# Patient Record
Sex: FEMALE | Race: ASIAN | NOT HISPANIC OR LATINO | Employment: FULL TIME
[De-identification: names, ages, dates, MRNs, and addresses within clinical notes are randomized per-mention and may not be internally consistent; named-entity substitution may affect disease eponyms.]

---

## 2017-10-29 ENCOUNTER — HEALTH MAINTENANCE LETTER (OUTPATIENT)
Age: 37
End: 2017-10-29

## 2018-06-20 ENCOUNTER — TELEPHONE (OUTPATIENT)
Dept: FAMILY MEDICINE | Facility: CLINIC | Age: 38
End: 2018-06-20

## 2018-06-20 ENCOUNTER — OFFICE VISIT (OUTPATIENT)
Dept: FAMILY MEDICINE | Facility: CLINIC | Age: 38
End: 2018-06-20
Payer: COMMERCIAL

## 2018-06-20 VITALS
SYSTOLIC BLOOD PRESSURE: 113 MMHG | HEART RATE: 64 BPM | HEIGHT: 62 IN | WEIGHT: 130.2 LBS | DIASTOLIC BLOOD PRESSURE: 76 MMHG | BODY MASS INDEX: 23.96 KG/M2 | TEMPERATURE: 97.5 F

## 2018-06-20 DIAGNOSIS — Z23 NEED FOR PROPHYLACTIC VACCINATION AND INOCULATION AGAINST VARICELLA: ICD-10-CM

## 2018-06-20 DIAGNOSIS — H93.11 TINNITUS, RIGHT: ICD-10-CM

## 2018-06-20 DIAGNOSIS — H72.92 PERFORATION OF TYMPANIC MEMBRANE, LEFT: ICD-10-CM

## 2018-06-20 DIAGNOSIS — E11.9 TYPE 2 DIABETES MELLITUS WITHOUT COMPLICATION, UNSPECIFIED LONG TERM INSULIN USE STATUS: Primary | ICD-10-CM

## 2018-06-20 LAB
ALBUMIN SERPL-MCNC: 4.7 MG/DL (ref 3.9–5.1)
ALP SERPL-CCNC: 53.7 U/L (ref 40–150)
ALT SERPL-CCNC: <15 U/L (ref 0–45)
AST SERPL-CCNC: 10.3 U/L (ref 0–45)
BASOPHILS # BLD AUTO: 0 THOU/UL (ref 0–0.2)
BASOPHILS NFR BLD AUTO: 1 % (ref 0–2)
BILIRUB SERPL-MCNC: 0.3 MG/DL (ref 0.2–1.3)
BUN SERPL-MCNC: 8.9 MG/DL (ref 7–19)
CALCIUM SERPL-MCNC: 8.9 MG/DL (ref 8.5–10.1)
CHLORIDE SERPLBLD-SCNC: 106 MMOL/L (ref 98–110)
CO2 SERPL-SCNC: 24.8 MMOL/L (ref 20–32)
CREAT SERPL-MCNC: 0.4 MG/DL (ref 0.5–1)
CREAT UR-MCNC: 86.3 MG/DL
EOSINOPHIL # BLD AUTO: 0.1 THOU/UL (ref 0–0.4)
EOSINOPHIL NFR BLD AUTO: 2 % (ref 0–6)
ERYTHROCYTE [DISTWIDTH] IN BLOOD BY AUTOMATED COUNT: 22.6 % (ref 11–14.5)
GFR SERPL CREATININE-BSD FRML MDRD: >90 ML/MIN/1.7 M2
GLUCOSE SERPL-MCNC: 89.9 MG'DL (ref 70–99)
HBA1C MFR BLD: 5.3 % (ref 4.1–5.7)
HCT VFR BLD AUTO: 17.8 % (ref 35–47)
HGB BLD-MCNC: 4.3 G/DL (ref 12–16)
LYMPHOCYTES # BLD AUTO: 1.1 THOU/UL (ref 0.8–4.4)
LYMPHOCYTES NFR BLD AUTO: 29 % (ref 20–40)
MCH RBC QN AUTO: 14.7 PG (ref 27–34)
MCHC RBC AUTO-ENTMCNC: 24.2 G/DL (ref 32–36)
MCV RBC AUTO: 61 FL (ref 80–100)
MICROALBUMIN UR-MCNC: 5.06 MG/DL (ref 0–1.99)
MICROALBUMIN/CREAT UR: 58.6 MG/G
MONOCYTES # BLD AUTO: 0.4 THOU/UL (ref 0–0.9)
MONOCYTES NFR BLD AUTO: 11 % (ref 2–10)
NEUTROPHILS # BLD AUTO: 2.3 THOU/UL (ref 2–7.7)
NEUTROPHILS NFR BLD AUTO: 59 % (ref 50–70)
OVALOCYTES BLD QL SMEAR: ABNORMAL
PLATELET # BLD AUTO: 282 THOU/UL (ref 140–440)
PLATELET BLD QL SMEAR: NORMAL
PMV BLD AUTO: ABNORMAL FL (ref 8.5–12.5)
POTASSIUM SERPL-SCNC: 3.5 MMOL/DL (ref 3.2–4.6)
PROT SERPL-MCNC: 7.5 G/DL (ref 6.8–8.8)
RBC # BLD AUTO: 2.92 MILL/UL (ref 3.8–5.4)
SODIUM SERPL-SCNC: 139.8 MMOL/L (ref 132–142)
WBC # BLD AUTO: 3.9 THOU/UL (ref 4–11)

## 2018-06-20 RX ORDER — LANCING DEVICE
EACH MISCELLANEOUS
Qty: 1 EACH | Refills: 0 | Status: SHIPPED | OUTPATIENT
Start: 2018-06-20 | End: 2018-06-28

## 2018-06-20 NOTE — MR AVS SNAPSHOT
After Visit Summary   2018    Lizzy Wright    MRN: [id number]           Patient Information     Date Of Birth          1980        Visit Information        Provider Department      2018 10:00 AM Yadira Kruse MD [facility]        Today's Diagnoses     Type 2 diabetes mellitus without complication, unspecified long term insulin use status (H)    -  1    Perforation of tympanic membrane, left        Tinnitus, right           Follow-ups after your visit        Additional Services     OTOLARYNGOLOGY REFERRAL       Patient to stop at the [employer] Desk    Reason for Referral: Perforation and Tinnitus     needed: Yes  Language: Tabatha    May leave message on voicemail: Yes                  Follow-up notes from your care team     Return in about 1 week (around 2018).      Future tests that were ordered for you today     Open Future Orders        Priority Expected Expires Ordered    OTOLARYNGOLOGY REFERRAL Routine  2018            Who to contact     Please call your clinic at [phone] to:    Ask questions about your health    Make or cancel appointments    Discuss your medicines    Learn about your test results    Speak to your doctor            Additional Information About Your Visit        COM [employer]er[employer] Information     Caterna is an electronic gateway that provides easy, online access to your medical records. With Caterna, you can request a clinic appointment, read your test results, renew a prescription or communicate with your care team.     To sign up for Caterna visit the website at [email or web address]   You will be asked to enter the access code listed below, as well as some personal information. Please follow the directions to create your username and password.     Your access code is: [email or web address]  Expires: 2018 10:40 AM     Your access code will  in 90 days. If you need help or a new code, please contact your [facility] or call [phone] for assistance.        Care EveryWhere ID     This is your Care EveryWhere ID. This could be used by other organizations to access your [facility] medical records  [id number]        Your Vitals Were     Pulse Temperature Height BMI (Body Mass Index)          64 97.5  F (36.4  C) 5' 1.5\" (156.2 cm) 24.2 kg/m2         Blood Pressure from Last 3 Encounters:   06/20/18 113/76   05/19/16 101/68   01/29/16 106/70    Weight from Last 3 Encounters:   06/20/18 130 lb 3.2 oz (59.1 kg)   05/19/16 153 lb 6.4 oz (69.6 kg)   01/29/16 148 lb 6.4 oz (67.3 kg)              We Performed the Following     CBC with Diff Plt ([facility])     Comprehensive Metabolic Panel ([facility])     Hemoglobin A1c ([facility])     MICRO ALBUMIN, RANDOM URINE     Vitamin D  25-Hydroxy ([facility])          Today's Medication Changes          These changes are accurate as of 6/20/18 10:40 AM.  If you have any questions, ask your nurse or doctor.               Start taking these medicines.        Dose/Directions    blood glucose lancets standard   Commonly known as:  no brand specified   Used for:  Type 2 diabetes mellitus without complication, unspecified long term insulin use status (H)   Started by:  Yadira Kruse MD        Use to test blood sugar 4 times daily or as directed.   Quantity:  100 each   Refills:  0       blood glucose lancing device   Commonly known as:  no brand specified   Used for:  Type 2 diabetes mellitus without complication, unspecified long term insulin use status (H)   Started by:  Yadira Kruse MD        Use to test blood sugars 4 times daily or as directed.   Quantity:  1 each   Refills:  0       blood glucose monitoring test strip   Commonly known as:  no brand specified   Used for:  Type 2 diabetes mellitus without complication, unspecified long term insulin use status (H)   Started by:  Yadira Kruse MD        Use to test blood sugars 4 times daily or as directed   Quantity: "  100 strip   Refills:  0         These medicines have changed or have updated prescriptions.        Dose/Directions    * CONTOUR NEXT EZ MONITOR w/Device Kit   This may have changed:  Another medication with the same name was added. Make sure you understand how and when to take each.   Changed by:  Yadira Kruse MD        Test when eating   Refills:  0       * blood glucose monitoring meter device kit   Commonly known as:  no brand specified   This may have changed:  You were already taking a medication with the same name, and this prescription was added. Make sure you understand how and when to take each.   Used for:  Type 2 diabetes mellitus without complication, unspecified long term insulin use status (H)   Changed by:  Yadira Kruse MD        Use to test blood sugar 4 times daily or as directed.   Quantity:  1 kit   Refills:  0       * Notice:  This list has 2 medication(s) that are the same as other medications prescribed for you. Read the directions carefully, and ask your doctor or other care provider to review them with you.         Where to get your medicines      These medications were sent to [facility] - [place], [place] - [address], [place] [address]     Phone:  [phone]     blood glucose lancets standard    blood glucose lancing device    blood glucose monitoring meter device kit    blood glucose monitoring test strip                Primary Care Provider Office Phone # Fax #    Deshaun Eid [phone]       [address] [address]        Equal Access to Services     JAIDEN MENDEZ AH: Monserrat felix Sobianca, waaxda luqadaha, qaybta kaalmada kar bullard. So [facility] [phone].    ATENCIÓN: Si habla español, tiene a amado disposición servicios gratuitos de asistencia lingüística. Llame al [phone].    We comply with applicable federal civil rights laws  and [place] laws. We do not discriminate on the basis of race, color, national origin, age, disability, sex, sexual orientation, or gender identity.            Thank you!     Thank you for choosing [facility]  for your care. Our goal is always to provide you with excellent care. Hearing back from our patients is one way we can continue to improve our services. Please take a few minutes to complete the written survey that you may receive in the mail after your visit with us. Thank you!             Your Updated Medication List - Protect others around you: Learn how to safely use, store and throw away your medicines at www.disposemymeds.org.          This list is accurate as of 6/20/18 10:40 AM.  Always use your most recent med list.                   Brand Name Dispense Instructions for use Diagnosis    blood glucose lancets standard    no brand specified    100 each    Use to test blood sugar 4 times daily or as directed.    Type 2 diabetes mellitus without complication, unspecified long term insulin use status (H)       blood glucose lancing device    no brand specified    1 each    Use to test blood sugars 4 times daily or as directed.    Type 2 diabetes mellitus without complication, unspecified long term insulin use status (H)       blood glucose monitoring test strip    no brand specified    100 strip    Use to test blood sugars 4 times daily or as directed    Type 2 diabetes mellitus without complication, unspecified long term insulin use status (H)       * CONTOUR NEXT EZ MONITOR w/Device Kit      Test when eating        * blood glucose monitoring meter device kit    no brand specified    1 kit    Use to test blood sugar 4 times daily or as directed.    Type 2 diabetes mellitus without complication, unspecified long term insulin use status (H)       * Notice:  This list has 2 medication(s) that are the same as other medications prescribed for you. Read the directions carefully, and ask your doctor or  other care provider to review them with you.

## 2018-06-20 NOTE — TELEPHONE ENCOUNTER
Called by lab for a critical lab value: Hemoglobin of 4.3.  I note low WBC of 3.9 and low hematocrit however normal platelets.  Spoke with the  who stated that he looked at the sample under the microscope and it did look like a sample with low hemoglobin. Unknown if there was a chance that this was contaminated.    Patient was seen in clinic today and not noted to be weak or dizzy. No falls noted. No acute bleed suspected.     Attempted to call the patient to assess symptoms. Patient did not answer. Through  line I was able to leave a message detailing the critically low hemoglobin and recommendation that patient be assessed in the ED immediately.  I also left the clinic phone number in case patient has any questions.      Will route this note to our RN triage nurse as well as the night call resident so that more attempts can be made to reach the patient.     Franny Reynoso MD  PGY-2, [facility]   Pager: [phone]

## 2018-06-20 NOTE — PROGRESS NOTES
Preceptor Attestation:   Patient seen, evaluated and discussed with the resident. I have verified the content of the note, which accurately reflects my assessment of the patient and the plan of care.   Supervising Physician:  Precious De Los Santos MD

## 2018-06-20 NOTE — TELEPHONE ENCOUNTER
Again attempted to call patient regarding abnormal lab value with the assistance of the Language line. Patient did not answer again. Had  leave message regarding concern for low hemoglobin and that patient should be assessed in the ER as a result.    This is attempt two on 6/20/2018 at 6:54 PM    Miriam Newberry DO  PGY2

## 2018-06-20 NOTE — PROGRESS NOTES
SUBJECTIVE       Ball Adriana is a 37 year old  female with a PMH significant for:     Patient Active Problem List   Diagnosis     History of      Need for prophylactic vaccination and inoculation against varicella     H/O gestational diabetes mellitus, not currently pregnant     DM2 (diabetes mellitus, type 2) (H)     She presents to re establish care.    Patient has not been to a doctor for the past 2 years due to lack of insurance.  She recently acquired insurance and is presenting to reinstate her primary care.  Patient has a history of diabetes after gestational diabetes.  Per chart review she was not on any medications last visit and labs were collected.  Patient has been out of medications and diabetes supplies for years.  Patient denies any other past medical history.      The past 3-4 months patient has been struggling with tinnitus in her right ear.  She states that sounds like wind blowing by her ear.  Most notable when it is quiet but they are consistently.  Patient notes no drainage or pain from either ear.     Patient has no other complaints or questions at this time.     PMH, Medications and Allergies were reviewed and updated as needed.        REVIEW OF SYSTEMS     CONSTITUTIONAL: NEGATIVE for fever, chills, change in weight  INTEGUMENTARY/SKIN: NEGATIVE for worrisome rashes, moles or lesions  EYES: NEGATIVE for vision changes or irritation  ENT/MOUTH: Pos for tinnitus in r ear. NEGATIVE for mouth and throat problems  RESP: NEGATIVE for significant cough or SOB  CV: NEGATIVE for chest pain, palpitations or peripheral edema  GI: NEGATIVE for nausea, abdominal pain, heartburn, or change in bowel habits  : NEGATIVE for frequency, dysuria, or hematuria  MUSCULOSKELETAL: NEGATIVE for significant arthralgias or myalgia  NEURO: NEGATIVE for weakness, dizziness or paresthesias        OBJECTIVE     Vitals:    18 0953   BP: 113/76   Pulse: 64   Temp: 97.5  F (36.4  C)   Weight: 130 lb 3.2  "oz (59.1 kg)   Height: 5' 1.5\" (156.2 cm)     Body mass index is 24.2 kg/(m^2).    Constitutional: Awake, alert, cooperative, no apparent distress, and appears stated age.  Eyes: Lids and lashes normal, pupils equal, sclera clear, conjunctiva normal.  ENT: Normocephalic, without obvious abnormality, atramatic, external ears without lesions, TMs note some scarring.  Right TM shows good cone of light and normal landmarks.  Left TM with perforation located centrally.  Not responsive to insufflation.    Neck: Supple, symmetrical, trachea midline, no adenopathy, thyroid symmetric, not enlarged and no tenderness, skin normal.  Lungs: No increased work of breathing, good air exchange, clear to auscultation bilaterally, no crackles or wheezing.  Cardiovascular: Regular rate and rhythm, normal S1 and S2, no S3 or S4, and no murmur noted.  Abdomen:Normal bowel sounds, soft, non-distended, non-tender, no masses palpated, no hepatosplenomegally.  Musculoskeletal: Full range of motion noted.   Neurologic: Awake, alert, oriented to name, place and time.  Cranial nerves II-XII are grossly intact.   gait is normal.  Neuropsychiatric: Normal affect, mood, orientation, memory and insight.  Skin: No rashes, erythema, pallor, petechia or purpura.    No results found for this or any previous visit (from the past 24 hour(s)).        ASSESSMENT AND PLAN     Ball was seen today for diabetes.    Diagnoses and all orders for this visit:    Type 2 diabetes mellitus without complication, unspecified long term insulin use status (H): Per chart review patient has had a history of hemoglobin A1c 13 postpartum and 6.5 most recently.  Unclear the degree of insulin resistance at this point.  Will get labs today and provide monitoring for home with plans of reevaluating in 1 week.  Depending on sugars may need to start medication.  Last lipid screening was normal.  Patient noted to have vitamin D deficiency and anemia on last screening. will get these " labs today as well.  -     blood glucose monitoring (NO BRAND SPECIFIED) meter device kit; Use to test blood sugar 4 times daily or as directed.  -     blood glucose monitoring (NO BRAND SPECIFIED) test strip; Use to test blood sugars 4 times daily or as directed  -     blood glucose (NO BRAND SPECIFIED) lancing device; Use to test blood sugars 4 times daily or as directed.  -     blood glucose (NO BRAND SPECIFIED) lancets standard; Use to test blood sugar 4 times daily or as directed.  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  Will refer to ENT for further evaluation  -     OTOLARYNGOLOGY REFERRAL; Future    Tinnitus, right: Due to symptoms and perforatio n present will refer for hearing evaluation  -     OTOLARYNGOLOGY REFERRAL; Future    Need for prophylactic vaccination and inoculation against varicella: patient noted to be nonimmune to varicella during last pregnancy.  Has only received 1 vaccination today.  Will complete second vaccination today  -     ADMIN VACCINE, INITIAL  -     CHICKEN POX VACCINE,LIVE,SUBCUT    Last Pap smear noted to be in 2013.  Advised patient to follow-up for complete physical with next visit.      RTC in 1 week for follow up of labs, DM, and pap smear or sooner if develops new or worsening symptoms.  I discussed the patient with   who is in agreement with the assessment and plan.   Yadira Kruse

## 2018-06-20 NOTE — NURSING NOTE
Due to patient being non-English speaking/uses sign language, an  was used for this visit. Only for face-to-face interpretation by an external agency, date and length of interpretation can be found on the scanned worksheet.     name: Sherif Nuñez  Agency: [employer]  Language: Tabatha   Telephone number: [phone]  Type of interpretation: Face-to-face, spoken

## 2018-06-21 LAB — 25(OH)D3 SERPL-MCNC: 12.2 NG/ML (ref 30–80)

## 2018-06-21 NOTE — PATIENT INSTRUCTIONS
"OTOLARYNGOLOGY REFERRAL  Referral placed online with [facility] who will contact patient to schedule  \"Thank you for your referral!  Thank you for your submission. We will respond to you as soon as possible.\"  "

## 2018-06-28 ENCOUNTER — OFFICE VISIT (OUTPATIENT)
Dept: FAMILY MEDICINE | Facility: CLINIC | Age: 38
End: 2018-06-28
Payer: COMMERCIAL

## 2018-06-28 VITALS
SYSTOLIC BLOOD PRESSURE: 115 MMHG | RESPIRATION RATE: 18 BRPM | BODY MASS INDEX: 23.89 KG/M2 | HEIGHT: 62 IN | TEMPERATURE: 97.8 F | WEIGHT: 129.8 LBS | HEART RATE: 59 BPM | DIASTOLIC BLOOD PRESSURE: 75 MMHG

## 2018-06-28 DIAGNOSIS — D50.8 OTHER IRON DEFICIENCY ANEMIA: Primary | ICD-10-CM

## 2018-06-28 DIAGNOSIS — Z86.39 HISTORY OF INSULIN DEPENDENT DIABETES MELLITUS: ICD-10-CM

## 2018-06-28 DIAGNOSIS — D50.8 OTHER IRON DEFICIENCY ANEMIA: ICD-10-CM

## 2018-06-28 LAB
BASOPHILS # BLD AUTO: 0.1 THOU/UL (ref 0–0.2)
BASOPHILS NFR BLD AUTO: 1 % (ref 0–2)
EOSINOPHIL # BLD AUTO: 0.1 THOU/UL (ref 0–0.4)
EOSINOPHIL NFR BLD AUTO: 1 % (ref 0–6)
ERYTHROCYTE [DISTWIDTH] IN BLOOD BY AUTOMATED COUNT: 29.7 % (ref 11–14.5)
HCT VFR BLD AUTO: 34.8 % (ref 35–47)
HGB BLD-MCNC: 10.4 G/DL (ref 12–16)
LYMPHOCYTES # BLD AUTO: 1.7 THOU/UL (ref 0.8–4.4)
LYMPHOCYTES NFR BLD AUTO: 27 % (ref 20–40)
MCH RBC QN AUTO: 21.4 PG (ref 27–34)
MCHC RBC AUTO-ENTMCNC: 29.9 G/DL (ref 32–36)
MCV RBC AUTO: 72 FL (ref 80–100)
MONOCYTES # BLD AUTO: 0.5 THOU/UL (ref 0–0.9)
MONOCYTES NFR BLD AUTO: 8 % (ref 2–10)
NEUTROPHILS # BLD AUTO: 3.8 THOU/UL (ref 2–7.7)
NEUTROPHILS NFR BLD AUTO: 62 % (ref 50–70)
OVALOCYTES BLD QL SMEAR: ABNORMAL
PLATELET # BLD AUTO: 227 THOU/UL (ref 140–440)
PLATELET BLD QL SMEAR: NORMAL
PMV BLD AUTO: ABNORMAL FL (ref 8.5–12.5)
RBC # BLD AUTO: 4.85 MILL/UL (ref 3.8–5.4)
WBC # BLD AUTO: 6.2 THOU/UL (ref 4–11)

## 2018-06-28 RX ORDER — AMOXICILLIN 250 MG
1 CAPSULE ORAL DAILY
COMMUNITY
End: 2018-06-28

## 2018-06-28 RX ORDER — FERROUS SULFATE 325(65) MG
325 TABLET ORAL
Qty: 90 TABLET | Refills: 3 | Status: SHIPPED | OUTPATIENT
Start: 2018-06-28 | End: 2021-03-24

## 2018-06-28 RX ORDER — AMOXICILLIN 250 MG
1 CAPSULE ORAL DAILY
Qty: 90 TABLET | Refills: 0 | Status: SHIPPED | OUTPATIENT
Start: 2018-06-28 | End: 2021-03-29

## 2018-06-28 RX ORDER — FERROUS SULFATE 325(65) MG
325 TABLET ORAL
COMMUNITY
Start: 2018-06-22 | End: 2018-06-28

## 2018-06-28 RX ORDER — FERROUS SULFATE 325(65) MG
325 TABLET ORAL
COMMUNITY
End: 2018-06-28

## 2018-06-28 RX ORDER — AMOXICILLIN 250 MG
2 CAPSULE ORAL
COMMUNITY
Start: 2018-06-22 | End: 2018-06-28

## 2018-06-28 NOTE — PROGRESS NOTES
SUBJECTIVE       Ball Adriana is a 37 year old  female with a PMH significant for:     Patient Active Problem List   Diagnosis     History of      H/O gestational diabetes mellitus, not currently pregnant     DM2 (diabetes mellitus, type 2) (H)     Perforation of tympanic membrane, left     She presents for hospitalization follow-up.    Patient was discharged from [facility] on  for anemia.  Patient was admitted after CBC showed a hemoglobin of 4.  She received 4 units.  All labs reviewed.  Most consistent with iron deficiency anemia.  Negative for thalassemia.  Patient was started on oral iron 3 times daily.    Patient is currently taking her iron supplement every morning.  She states it upsets her stomach.  Encouraged her to increase it to as prescribed.  Advised taking it with food.  Patient states her tinnitus fatigue and restless leg have all improved since her transfusion.  Patient has been taking senna for constipation.  Patient denies any dark or tarry stools, abdominal pain, cold symptoms, dysuria, shortness of breath, palpitations, chest pain.    Of note per hospital record patient had multiple readings of blood sugars in the 70s and 80s.  I do not believe she needs qualification of diabetes at this time.    PMH, Medications and Allergies were reviewed and updated as needed.        REVIEW OF SYSTEMS     CONSTITUTIONAL: NEGATIVE for fever, chills  INTEGUMENTARY/SKIN: NEGATIVE for worrisome rashes, moles or lesions  EYES: NEGATIVE for vision changes or irritation  ENT/MOUTH: NEGATIVE for ear, mouth and throat problems  RESP: NEGATIVE for significant cough or SOB  CV: NEGATIVE for chest pain, palpitations or peripheral edema  GI: Abdominal pain and constipation with PO iron   : NEGATIVE for frequency, dysuria, or hematuria  MUSCULOSKELETAL: NEGATIVE for significant arthralgias or myalgia  NEURO: NEGATIVE for weakness, dizziness or paresthesias  HEME/ALLERGY: NEGATIVE for bleeding  "problems  PSYCHIATRIC: NEGATIVE for changes in mood or affect        OBJECTIVE     Vitals:    06/28/18 1025   BP: 115/75   Pulse: 59   Resp: 18   Temp: 97.8  F (36.6  C)   Weight: 129 lb 12.8 oz (58.9 kg)   Height: 5' 1.5\" (156.2 cm)     Body mass index is 24.13 kg/(m^2).    Constitutional: Awake, alert, cooperative, no apparent distress, and appears stated age.  Eyes: Lids and lashes normal, sclera clear, conjunctiva normal.  ENT: Normocephalic, without obvious abnormality, atramatic  Hematologic / Lymphatic: No cervical lymphadenopathy and no supraclavicular lymphadenopathy.  Lungs: No increased work of breathing, good air exchange, clear to auscultation bilaterally, no crackles or wheezing.  Cardiovascular: Regular rate and rhythm, normal S1 and S2, no S3 or S4, and no murmur noted.  Abdomen: No scars, normal bowel sounds, soft, non-distended, non-tender, no masses palpated, no hepatosplenomegally.  Musculoskeletal: No redness, warmth, or swelling of the joints.  Full range of motion noted.   Neurologic: Awake, alert, oriented to name, place and time.  Cranial nerves II-XII are grossly intact. Gait is normal.  Neuropsychiatric: Normal affect, mood, orientation, memory and insight.  Skin: No rashes, erythema, pallor, petechia or purpura.    No results found for this or any previous visit (from the past 24 hour(s)).      ASSESSMENT AND PLAN     1. Other iron deficiency anemia  - CBC w/ Diff and Plt ([facility])  - senna-docusate (SENOKOT-S;PERICOLACE) 8.6-50 MG per tablet; Take 1 tablet by mouth daily  Dispense: 90 tablet; Refill: 0  - ferrous sulfate (IRON) 325 (65 Fe) MG tablet; Take 1 tablet (325 mg) by mouth 3 times daily (with meals)  Dispense: 90 tablet; Refill: 3    RTC in 2 months for follow up of Hgb and ferratin or sooner if develops new or worsening symptoms. Patient will need a pap smear at that visit.    I discussed the patient with  who is in agreement with the assessment and plan. "   Yadira Kruse

## 2018-06-28 NOTE — MR AVS SNAPSHOT
"              After Visit Summary   2018    Lizzy Wright    MRN: [id number]           Patient Information     Date Of Birth          1980        Visit Information        Provider Department      2018 10:00 AM Yadira Kruse MD [facility]        Today's Diagnoses     Other iron deficiency anemia           Follow-ups after your visit        Follow-up notes from your care team     Return in about 2 months (around 2018) for Lab Work, Physical Exam.      Who to contact     Please call your clinic at [phone] to:    Ask questions about your health    Make or cancel appointments    Discuss your medicines    Learn about your test results    Speak to your doctor            Additional Information About Your Visit        MyChart Information     [employer] is an electronic gateway that provides easy, online access to your medical records. With [employer], you can request a clinic appointment, read your test results, renew a prescription or communicate with your care team.     To sign up for [employer] visit the website at [email or web address]   You will be asked to enter the access code listed below, as well as some personal information. Please follow the directions to create your username and password.     Your access code is: [email or web address]  Expires: 2018 10:40 AM     Your access code will  in 90 days. If you need help or a new code, please contact your [facility] or call [phone] for assistance.        Care EveryWhere ID     This is your Care EveryWhere ID. This could be used by other organizations to access your [facility] medical records  [id number]        Your Vitals Were     Pulse Temperature Respirations Height BMI (Body Mass Index)       59 97.8  F (36.6  C) 18 5' 1.5\" (156.2 cm) 24.13 kg/m2        Blood Pressure from Last 3 Encounters:   18 115/75   18 113/76   16 101/68    Weight from Last 3 Encounters:   18 129 lb 12.8 " oz (58.9 kg)   06/20/18 130 lb 3.2 oz (59.1 kg)   05/19/16 153 lb 6.4 oz (69.6 kg)              We Performed the Following     CBC w/ Diff and Plt ([facility])          Today's Medication Changes          These changes are accurate as of 6/28/18 10:56 AM.  If you have any questions, ask your nurse or doctor.               These medicines have changed or have updated prescriptions.        Dose/Directions    ferrous sulfate 325 (65 Fe) MG tablet   Commonly known as:  IRON   This may have changed:  when to take this   Used for:  Other iron deficiency anemia   Changed by:  Yadira Kruse MD        Dose:  325 mg   Take 1 tablet (325 mg) by mouth 3 times daily (with meals)   Quantity:  90 tablet   Refills:  3            Where to get your medicines      These medications were sent to [facility] - [place], [place] - [address], [place] [address]     Phone:  [phone]     ferrous sulfate 325 (65 Fe) MG tablet    senna-docusate 8.6-50 MG per tablet                Primary Care Provider Office Phone # Fax #    Deshaun Eid [phone]       [address] [address]        Equal Access to Services     JAIDEN MENDEZ : Monserrat martinezo Sobianca, waaxda luileanaadaha, qaybta kaalmada adeegyada, kar miles. So [facility] [phone].    ATENCIÓN: Si habla español, tiene a amado disposición servicios gratuitos de asistencia lingüística. Llame al [phone].    We comply with applicable federal civil rights laws and [place] laws. We do not discriminate on the basis of race, color, national origin, age, disability, sex, sexual orientation, or gender identity.            Thank you!     Thank you for choosing [facility]  for your care. Our goal is always to provide you with excellent care. Hearing back from our patients is one way we can continue to improve our services. Please take a few minutes to complete the  written survey that you may receive in the mail after your visit with us. Thank you!             Your Updated Medication List - Protect others around you: Learn how to safely use, store and throw away your medicines at www.disposemymeds.org.          This list is accurate as of 6/28/18 10:56 AM.  Always use your most recent med list.                   Brand Name Dispense Instructions for use Diagnosis    ferrous sulfate 325 (65 Fe) MG tablet    IRON    90 tablet    Take 1 tablet (325 mg) by mouth 3 times daily (with meals)    Other iron deficiency anemia       senna-docusate 8.6-50 MG per tablet    SENOKOT-S;PERICOLACE    90 tablet    Take 1 tablet by mouth daily    Other iron deficiency anemia

## 2018-06-28 NOTE — PROGRESS NOTES
Preceptor Attestation:   Patient seen, evaluated and discussed with the resident. I have verified the content of the note, which accurately reflects my assessment of the patient and the plan of care.   Supervising Physician:  Mayur Orozco MD

## 2018-06-29 PROBLEM — Z86.39 HISTORY OF INSULIN DEPENDENT DIABETES MELLITUS: Status: ACTIVE | Noted: 2018-06-29

## 2020-05-14 ENCOUNTER — VIRTUAL VISIT (OUTPATIENT)
Dept: FAMILY MEDICINE | Facility: CLINIC | Age: 40
End: 2020-05-14
Payer: COMMERCIAL

## 2020-05-14 VITALS — WEIGHT: 142 LBS | BODY MASS INDEX: 26.4 KG/M2

## 2020-05-14 DIAGNOSIS — R53.83 FATIGUE, UNSPECIFIED TYPE: ICD-10-CM

## 2020-05-14 DIAGNOSIS — Z86.2 HISTORY OF ANEMIA: Primary | ICD-10-CM

## 2020-05-14 DIAGNOSIS — Z86.2 HISTORY OF ANEMIA: ICD-10-CM

## 2020-05-14 LAB
ALBUMIN SERPL-MCNC: 5 MG/DL (ref 3.9–5.1)
ALP SERPL-CCNC: 74.4 U/L (ref 40–150)
ALT SERPL-CCNC: 16.5 U/L (ref 0–45)
AST SERPL-CCNC: 17.5 U/L (ref 0–45)
BASOPHILS # BLD AUTO: 0 THOU/UL (ref 0–0.2)
BASOPHILS NFR BLD AUTO: 1 % (ref 0–2)
BILIRUB SERPL-MCNC: <0.3 MG/DL (ref 0.2–1.3)
BUN SERPL-MCNC: <4.7 MG/DL (ref 7–19)
CALCIUM SERPL-MCNC: 8.9 MG/DL (ref 8.5–10.1)
CHLORIDE SERPLBLD-SCNC: 108.4 MMOL/L (ref 98–110)
CO2 SERPL-SCNC: 24.9 MMOL/L (ref 20–32)
CREAT SERPL-MCNC: 0.5 MG/DL (ref 0.5–1)
DACRYOCYTES BLD QL SMEAR: ABNORMAL
EOSINOPHIL # BLD AUTO: 0.1 THOU/UL (ref 0–0.4)
EOSINOPHIL NFR BLD AUTO: 1 % (ref 0–6)
ERYTHROCYTE [DISTWIDTH] IN BLOOD BY AUTOMATED COUNT: 23 % (ref 11–14.5)
FERRITIN SERPL-MCNC: <2 NG/ML (ref 10–130)
GFR SERPL CREATININE-BSD FRML MDRD: >90 ML/MIN/1.7 M2
GLUCOSE SERPL-MCNC: 141.4 MG'DL (ref 70–99)
HBA1C MFR BLD: 5.9 % (ref 0–5.7)
HCT VFR BLD AUTO: 17.3 % (ref 35–47)
HGB BLD-MCNC: 4.1 G/DL (ref 12–16)
IRON SATN MFR SERPL: 2 % (ref 20–50)
IRON SERPL-MCNC: 11 UG/DL (ref 42–175)
LYMPHOCYTES # BLD AUTO: 1.4 THOU/UL (ref 0.8–4.4)
LYMPHOCYTES NFR BLD AUTO: 33 % (ref 20–40)
MCH RBC QN AUTO: 14.3 PG (ref 27–34)
MCHC RBC AUTO-ENTMCNC: 23.7 G/DL (ref 32–36)
MCV RBC AUTO: 61 FL (ref 80–100)
MONOCYTES # BLD AUTO: 0.8 THOU/UL (ref 0–0.9)
MONOCYTES NFR BLD AUTO: 18 % (ref 2–10)
NEUTROPHILS # BLD AUTO: 1.9 THOU/UL (ref 2–7.7)
NEUTROPHILS NFR BLD AUTO: 47 % (ref 50–70)
OVALOCYTES BLD QL SMEAR: ABNORMAL
PLATELET # BLD AUTO: 306 THOU/UL (ref 140–440)
PLATELET BLD QL SMEAR: NORMAL
PMV BLD AUTO: ABNORMAL FL
POLYCHROMASIA BLD QL SMEAR: ABNORMAL
POTASSIUM SERPL-SCNC: 3.6 MMOL/L (ref 3.2–4.6)
PROT SERPL-MCNC: 8.1 G/DL (ref 6.8–8.8)
RBC # BLD AUTO: 2.86 MILL/UL (ref 3.8–5.4)
SCHISTOCYTES BLD QL SMEAR: ABNORMAL
SODIUM SERPL-SCNC: 139.1 MMOL/L (ref 132–142)
TARGETS BLD QL SMEAR: ABNORMAL
TIBC SERPL-MCNC: 520 UG/DL (ref 313–563)
TRANSFERRIN SERPL-MCNC: 416 MG/DL (ref 212–360)
WBC # BLD AUTO: 4.1 THOU/UL (ref 4–11)

## 2020-05-14 NOTE — PROGRESS NOTES
"Family Medicine Telephone Visit Note         Telephone Visit Consent   Patient was verbally read the following and verbal consent was obtained.    \"Telephone visits are billed at different rates depending on your insurance coverage. During this emergency period, for some insurers they may be billed the same as an in-person visit.  Please reach out to your insurance provider with any questions.  If during the course of the call the physician/provider feels a telephone visit is not appropriate, you will not be charged for this service.\"    Name person giving consent:  Patient   Date verbal consent given:  5/14/2020  Time verbal consent given:  9:22 AM    Chief Complaint   Patient presents with     Diabetes     she wants to be screen for DM, wants to know if her sugars are going up or down      Ear Problem     she is having some ringing in both ears, been going on for 3-4 months,      Due to patient being non-English speaking/uses sign language, an  was used for this visit. Only for face-to-face interpretation by an external agency, date and length of interpretation can be found on the scanned worksheet.     name: Cresencio Dupont   Agency: [employer]  Language: Tabatha   Type of interpretation: Telephone, spoken         HPI   Patients name: Lizzy  Appointment start time:  9:32 AM    Lizzy Cohen is a pleasant 29-year-old female who has a visit today to discuss fatigue.  Patient notes that she had similar symptoms a few years back and was admitted to the hospital.  She is unclear if this was due to diabetes.  On chart review it appears that patient was admitted for severe anemia.    Admission summary per care everywhere:   Lizzy Cohen was admitted on 6/21/2018 for severe anemia.  She was found to have a hemoglobin of 4 at her clinic the day prior to admission.  At that time her MCV was 59.  She received 4 units of packed red blood cells.  She did not have any source of bleeding apart from being on her menstrual " "cycle.  Menstrual cycle was normal at that time.  Her ferritin and iron stores were severely low at that time.Patient endorsed a Vegetarian diet. Patient was discharged with a hemoglobin of 8.7.    She followed up in our clinic directly following dicharge back in 2018.  She had been discharged with instructions to take 1 tablet of ferrous sulfate 3 times a day.  This was somewhat upsetting her stomach and patient was taking senna.  Patient notes that she has not taken her medication in over a year now as she has not had insurance.  She recently got insurance again which is why she is calling today.  She notes that she has been having fatigue over the last 4 months.  She denies any active bleeding.  She does have a regular menstrual cycle however it is not heavy or prolonged.  She has no rectal bleeding, no hematemesis.  She denies any family history of any bleeding disorders.  No history of hemoglobinopathies.  Patient denies any rashes or new concerns.  Patient does have a history of type 2 diabetes per her chart.  However patient sugars during her last admission were running in the 70s.  She has not taken anything for diabetes medication since that time.    Current Outpatient Medications   Medication Sig Dispense Refill     ferrous sulfate (IRON) 325 (65 Fe) MG tablet Take 1 tablet (325 mg) by mouth 3 times daily (with meals) 90 tablet 3     senna-docusate (SENOKOT-S;PERICOLACE) 8.6-50 MG per tablet Take 1 tablet by mouth daily 90 tablet 0     Allergies   Allergen Reactions     Nkda [No Known Drug Allergies]               Review of Systems:     Constitutional, HEENT, cardiovascular, pulmonary, gi and gu systems are negative, except as otherwise noted.         Physical Exam:     There were no vitals taken for this visit.  Estimated body mass index is 24.13 kg/m  as calculated from the following:    Height as of 6/28/18: 1.562 m (5' 1.5\").    Weight as of 6/28/18: 58.9 kg (129 lb 12.8 oz).    Exam:  Constitutional: " healthy, alert and no distress  Psychiatric: mentation appears normal and affect normal/bright        Assessment and Plan   1. History of anemia  2. Fatigue, unspecified type  Patient came into clinic to do laboratory work today as requested.  Glucose was 141.  Hemoglobin was reported back at 4.  Laboratory work was sent to [facility] for confirmatory testing.  BNP was normal.  Patient was called and instructed to go to the emergency room.  She will likely require blood and iron transfusions. Patient will have one of her sibling drive her to [facility] emergency department. On review of patient's results from 2018-lead was negative.  Hemoglobinopathy/thalassemia cascade was negative however pathologist recommended repeating once iron stores had been replenished.  No personal or family history of any bleeding disorders or other genetic issues. She does have a history of a vegetarian diet. Has not taken iron in over 1 year due to insurance issues. Will need close follow up in clinic once discharged from the hospital.  - CBC with Diff Plt ([facility]); Future  - Iron Transferrin Saturat ([facility]); Future  - Comprehensive Metabolic Panel ([facility]); Future  - Hemoglobin A1c ([facility]); Future  - Ferritin ([facility]); Future    After Visit Information:  Patient declined AVS     No follow-ups on file.    Appointment end time: 9: 52 am  This is a telephone visit that took 20 minutes.      Clinician location:  [facility]    Rubi Aviles DO  I precepted today with Dr. Plunkett.

## 2020-05-15 NOTE — RESULT ENCOUNTER NOTE
Patient called regarding results and sent to [facility] emergency department.     Thank you, Rubi Aviles PG3

## 2020-05-18 NOTE — PROGRESS NOTES
Preceptor Attestation:   I talked to the patient on the phone. I discussed the patient with the resident. I have verified the content of the note, which accurately reflects my assessment of the patient and the plan of care.   Supervising Physician:  Josh Plunkett MD.

## 2021-03-23 ENCOUNTER — OFFICE VISIT (OUTPATIENT)
Dept: FAMILY MEDICINE | Facility: CLINIC | Age: 41
End: 2021-03-23
Payer: COMMERCIAL

## 2021-03-23 VITALS
SYSTOLIC BLOOD PRESSURE: 125 MMHG | RESPIRATION RATE: 18 BRPM | HEART RATE: 76 BPM | BODY MASS INDEX: 27.49 KG/M2 | WEIGHT: 145.6 LBS | HEIGHT: 61 IN | TEMPERATURE: 98 F | DIASTOLIC BLOOD PRESSURE: 85 MMHG | OXYGEN SATURATION: 99 %

## 2021-03-23 DIAGNOSIS — Z00.00 ROUTINE GENERAL MEDICAL EXAMINATION AT A HEALTH CARE FACILITY: Primary | ICD-10-CM

## 2021-03-23 DIAGNOSIS — R07.9 CHEST PAIN, UNSPECIFIED TYPE: ICD-10-CM

## 2021-03-23 DIAGNOSIS — N89.8 VAGINAL ITCHING: ICD-10-CM

## 2021-03-23 DIAGNOSIS — Z86.39 HISTORY OF INSULIN DEPENDENT DIABETES MELLITUS: ICD-10-CM

## 2021-03-23 LAB
BACTERIA: NORMAL
CHOLEST SERPL-MCNC: 183.8 MG/DL (ref 0–200)
CHOLEST/HDLC SERPL: 4.2 {RATIO} (ref 0–5)
CLUE CELLS: NORMAL
CREAT UR-MCNC: 32.7 MG/DL
ERYTHROCYTE [DISTWIDTH] IN BLOOD BY AUTOMATED COUNT: 17 % (ref 10–15)
ERYTHROCYTE [DISTWIDTH] IN BLOOD BY AUTOMATED COUNT: 17.4 % (ref 11–14.5)
HBA1C MFR BLD: 11 % (ref 4.1–5.7)
HCT VFR BLD AUTO: 30.7 % (ref 35–47)
HCT VFR BLD AUTO: 31.9 % (ref 35–47)
HDLC SERPL-MCNC: 43.5 MG/DL
HEMOGLOBIN: 8.8 G/DL (ref 11.7–15.7)
HGB BLD-MCNC: 8.8 G/DL (ref 12–16)
LDLC SERPL CALC-MCNC: 107 MG/DL (ref 0–129)
MCH RBC QN AUTO: 18.4 PG (ref 27–34)
MCH RBC QN AUTO: 18.5 PG (ref 26.5–35)
MCHC RBC AUTO-ENTMCNC: 27.6 G/DL (ref 32–36)
MCHC RBC AUTO-ENTMCNC: 28.7 G/DL (ref 32–36)
MCV RBC AUTO: 64.5 FL (ref 78–100)
MCV RBC AUTO: 67 FL (ref 80–100)
MICROALBUMIN UR-MCNC: 23.69 MG/DL (ref 0–1.99)
MICROALBUMIN/CREAT UR: 724.5 MG/G
MOTILE TRICHOMONAS: NEGATIVE
ODOR: NORMAL
PH WET PREP: NORMAL (ref 3.8–4.5)
PLATELET # BLD AUTO: 289 THOU/UL (ref 140–440)
PLATELET # BLD AUTO: 307 10E9/L (ref 150–450)
RBC # BLD AUTO: 4.79 MILL/UL (ref 3.8–5.4)
RBC # BLD AUTO: 4.8 10E12/L (ref 3.8–5.2)
TRIGL SERPL-MCNC: 168.6 MG/DL (ref 0–150)
VLDL CHOLESTEROL: 33.7 MG/DL (ref 7–32)
WBC # BLD AUTO: 7.4 10E9/L (ref 4–11)
WBC # BLD AUTO: 8 THOU/UL (ref 4–11)
WBC WET PREP: NORMAL (ref 2–5)
YEAST: NORMAL

## 2021-03-23 PROCEDURE — 80061 LIPID PANEL: CPT | Performed by: STUDENT IN AN ORGANIZED HEALTH CARE EDUCATION/TRAINING PROGRAM

## 2021-03-23 PROCEDURE — 93000 ELECTROCARDIOGRAM COMPLETE: CPT | Performed by: STUDENT IN AN ORGANIZED HEALTH CARE EDUCATION/TRAINING PROGRAM

## 2021-03-23 PROCEDURE — 83036 HEMOGLOBIN GLYCOSYLATED A1C: CPT | Performed by: STUDENT IN AN ORGANIZED HEALTH CARE EDUCATION/TRAINING PROGRAM

## 2021-03-23 PROCEDURE — 99213 OFFICE O/P EST LOW 20 MIN: CPT | Mod: 25 | Performed by: STUDENT IN AN ORGANIZED HEALTH CARE EDUCATION/TRAINING PROGRAM

## 2021-03-23 PROCEDURE — 36415 COLL VENOUS BLD VENIPUNCTURE: CPT | Performed by: STUDENT IN AN ORGANIZED HEALTH CARE EDUCATION/TRAINING PROGRAM

## 2021-03-23 PROCEDURE — 99395 PREV VISIT EST AGE 18-39: CPT | Mod: 25 | Performed by: STUDENT IN AN ORGANIZED HEALTH CARE EDUCATION/TRAINING PROGRAM

## 2021-03-23 PROCEDURE — 87210 SMEAR WET MOUNT SALINE/INK: CPT | Performed by: STUDENT IN AN ORGANIZED HEALTH CARE EDUCATION/TRAINING PROGRAM

## 2021-03-23 PROCEDURE — 85027 COMPLETE CBC AUTOMATED: CPT | Performed by: STUDENT IN AN ORGANIZED HEALTH CARE EDUCATION/TRAINING PROGRAM

## 2021-03-23 RX ORDER — FLUCONAZOLE 150 MG/1
150 TABLET ORAL ONCE
Qty: 1 TABLET | Refills: 0 | Status: SHIPPED | OUTPATIENT
Start: 2021-03-23 | End: 2021-03-26

## 2021-03-23 RX ORDER — ACETAMINOPHEN 325 MG/1
325-650 TABLET ORAL EVERY 6 HOURS PRN
Qty: 90 TABLET | Refills: 0 | Status: SHIPPED | OUTPATIENT
Start: 2021-03-23 | End: 2022-06-23

## 2021-03-23 ASSESSMENT — MIFFLIN-ST. JEOR: SCORE: 1277.82

## 2021-03-23 NOTE — PROGRESS NOTES
Preceptor Attestation:    I discussed the patient with the resident and evaluated the patient in person. I reviewed the ECG with the resident.  I have verified the content of the note, which accurately reflects my assessment of the patient and the plan of care.   Supervising Physician:  Scott Palomo MD.

## 2021-03-23 NOTE — PROGRESS NOTES
Female Physical Note    Concerns today: Vaginal itching, periodic chest pain    A [employer]  was used for  this visit.     ROS:  CONSTITUTIONAL: no fatigue, no unexpected change in weight  SKIN: no worrisome rashes, no worrisome moles, no worrisome lesions  EYES: no acute vision problems or changes  ENT: no ear problems, no mouth problems, no throat problems  RESP: no significant cough, no shortness of breath  CV:Periodic left sided chest pain that radiates to the back. Usually happens at night when it occurs. Not associated with activity. No family history of heart problems.   GI: no nausea, no vomiting, no constipation, no diarrhea   female: Vaginal itching for 4-5 months, used a pill in the past that helped, sometimes discharge    Sexually Active: Yes  Sexual concerns: No   Contraception:tubal ligation   P:5  Menarche: Finishing period right now Patient's last menstrual period was 03/15/2021 (approximate).   STD History: Neg  Last Pap Smear Date: 13- normal  Abnormal Pap History: None    Patient Active Problem List   Diagnosis     History of      H/O gestational diabetes mellitus, not currently pregnant     Perforation of tympanic membrane, left     History of insulin dependent diabetes mellitus       Current Outpatient Medications   Medication Sig Dispense Refill     ferrous sulfate (IRON) 325 (65 Fe) MG tablet Take 1 tablet (325 mg) by mouth 3 times daily (with meals) (Patient not taking: Reported on 2020) 90 tablet 3     senna-docusate (SENOKOT-S;PERICOLACE) 8.6-50 MG per tablet Take 1 tablet by mouth daily (Patient not taking: Reported on 2020) 90 tablet 0       Past Medical History:   Diagnosis Date     Diabetes (H)      H/O seasonal allergies         Family History     Problem (# of Occurrences) Relation (Name,Age of Onset)    Family History Negative (1) Other       Negative family history of: Diabetes, Coronary Artery Disease, Cancer, Heart Disease          Problem  "List Medication List and Allergy List were reviewed.    Patient is an established patient of this clinic..    Social History     Tobacco Use     Smoking status: Never Smoker     Smokeless tobacco: Never Used   Substance Use Topics     Alcohol use: No       Children ? yes - 5 children    Has anyone hurt you physically, for example by pushing, hitting, slapping or kicking you or forcing you to have sex? Denies  Do you feel threatened or controlled by a partner, ex-partner or anyone in your life? Denies    RISK BEHAVIORS AND HEALTHY HABITS:  Tobacco Use/Smoking: Eating betle nut  Illicit Drug Use: None  Do you use alcohol? 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    Immunization History   Administered Date(s) Administered     HepB 01/14/2010, 06/08/2010, 04/25/2011     Influenza (IIV3) PF 10/18/2013     Influenza Vaccine, 6+MO IM (QUADRIVALENT W/PRESERVATIVES) 01/05/2015, 11/12/2015     MMR 01/14/2010, 04/25/2011     Pneumococcal 23 valent 05/19/2016     TD (ADULT, 7+) 01/14/2010, 06/08/2010, 07/20/2012     TDAP Vaccine (Boostrix) 10/18/2013     Tdap (Adacel,Boostrix) 04/25/2011     Varicella 05/19/2016, 06/20/2018    Reviewed Immunization Record Today    EXAMINATION:   /85 (BP Location: Right arm, Patient Position: Sitting, Cuff Size: Adult Regular)   Pulse 76   Temp 98  F (36.7  C) (Oral)   Resp 18   Ht 1.549 m (5' 1\")   Wt 66 kg (145 lb 9.6 oz)   LMP 03/15/2021 (Approximate)   SpO2 99%   BMI 27.51 kg/m    GENERAL: healthy, alert and no distress  EYES: Eyes grossly normal to inspection, extraocular movements - intact, and PERRL  HENT: external ears - normal  NECK: no tenderness, no adenopathy, no asymmetry, no masses, no " stiffness; thyroid- normal to palpation  RESP: lungs clear to auscultation - no rales, no rhonchi, no wheezes  CHEST WALL - Tenderness to palpation of the left chest wall  CV: regular rates and rhythm, normal S1 S2, no S3 or S4 and no murmur, no click or rub -  ABDOMEN: soft, no tenderness, no  hepatosplenomegaly, no masses, normal bowel sounds  MS: extremities- no gross deformities noted, no edema  SKIN: no suspicious lesions, no rashes  NEURO: strength and tone- normal, sensory exam- grossly normal, mentation- intact, speech- normal, reflexes- symmetric  BACK: no CVA tenderness, no paralumbar tenderness  - female: cervix- normal, adnexae- normal; uterus- normal, no masses, no discharge  RECTAL- female: no masses, no hemorrhoids  PSYCH: Alert and oriented times 3; speech- coherent , normal rate and volume; able to articulate logical thoughts, able to abstract reason, no tangential thoughts, no hallucinations or delusions, affect- normal    ASSESSMENT:  1. Routine general medical examination at a health care facility  Follow up for a telephone visit for lab results.   - CBC with Plt ([facility])  - GYN Cytology ([facility])  - Lipid Panel ([facility])    2. Vaginal itching  Symptoms and history consistent with vaginal candidiasis. No significant discharge noted on exam today. Given length of symptoms, will send a dose of fluconazole.   - Wet Prep ([facility])  - fluconazole (DIFLUCAN) 150 MG tablet; Take 1 tablet (150 mg) by mouth once for 1 dose  Dispense: 1 tablet; Refill: 0    3. History of insulin dependent diabetes mellitus  Hx of diabetes in pregnancy. Last A1C was 5.9 in 5/2020.   - Hemoglobin A1c ([facility])  - Microalbumin Creatinine Ratio Random Ur ([facility])    4. Chest pain, unspecified type  Seems most consistent with chest wall pain, but that does not explain the radiation to the back. EKG today showed normal sinus rhythm. No abnormal q waves or t waves. Did prescribe Tylenol for musculoskeletal pain.   - EKG  12-lead complete w/read - Clinics    PLAN:  1. Routine follow up in one year.  2. Schedule phone visit in 1 week to discuss lab results

## 2021-03-23 NOTE — PATIENT INSTRUCTIONS
1. Take 1 pill (fluconazole) for the vaginal itching      Preventive Health Recommendations  Female Ages 26 - 39  Yearly exam:   See your health care provider every year in order to    Review health changes.     Discuss preventive care.      Review your medicines if you your doctor has prescribed any.    Until age 30: Get a Pap test every three years (more often if you have had an abnormal result).    After age 30: Talk to your doctor about whether you should have a Pap test every 3 years or have a Pap test with HPV screening every 5 years.   You do not need a Pap test if your uterus was removed (hysterectomy) and you have not had cancer.  You should be tested each year for STDs (sexually transmitted diseases), if you're at risk.   Talk to your provider about how often to have your cholesterol checked.  If you are at risk for diabetes, you should have a diabetes test (fasting glucose).  Shots: Get a flu shot each year. Get a tetanus shot every 10 years.   Nutrition:     Eat at least 5 servings of fruits and vegetables each day.    Eat whole-grain bread, whole-wheat pasta and brown rice instead of white grains and rice.    Get adequate Calcium and Vitamin D.     Lifestyle    Exercise at least 150 minutes a week (30 minutes a day, 5 days of the week). This will help you control your weight and prevent disease.    Limit alcohol to one drink per day.    No smoking.     Wear sunscreen to prevent skin cancer.    See your dentist every six months for an exam and cleaning.

## 2021-03-23 NOTE — NURSING NOTE
Due to patient being non-English speaking/uses sign language, an  was used for this visit. Only for face-to-face interpretation by an external agency, date and length of interpretation can be found on the scanned worksheet.     name: Lay  Agency: [employer]  Language: Tabatha   Telephone number: ID: [phone]  Type of interpretation: Telemedicine, spoken            The first  left, so we have to call the  again.   The second  is this.    Due to patient being non-English speaking/uses sign language, an  was used for this visit. Only for face-to-face interpretation by an external agency, date and length of interpretation can be found on the scanned worksheet.     name: Per Mentasta  Agency: [employer]  Language: Tabatha   Telephone number: ID: [phone]  Type of interpretation: Telemedicine, spoken

## 2021-03-24 PROBLEM — Z86.39 HISTORY OF INSULIN DEPENDENT DIABETES MELLITUS: Status: RESOLVED | Noted: 2018-06-29 | Resolved: 2021-03-24

## 2021-03-24 LAB
FINAL DIAGNOSIS: NORMAL
HPV HR 12 DNA CVX QL NAA+PROBE: NEGATIVE
HPV16 DNA SPEC QL NAA+PROBE: NEGATIVE
HPV18 DNA SPEC QL NAA+PROBE: NEGATIVE
SPECIMEN DESCRIPTION: NORMAL
SPECIMEN SOURCE CVX/VAG CYTO: NORMAL

## 2021-03-24 NOTE — RESULT ENCOUNTER NOTE
Microcytic anemia. Elevated A1C. Clinical proteinuria. Lipids ok. Wet prep negative.     Will discuss the results with patient at upcoming visit on 3/26/21.

## 2021-03-24 NOTE — PROGRESS NOTES
Lizzy is a 40 year old who is being evaluated via a billable telephone visit.      What phone number would you like to be contacted at? [phone]    How would you like to obtain your AVS? Don't want it       Assessment & Plan     Diabetes mellitus with proteinuria (H)  Discussed diabetes diagnosis with proteinuria. Instructed patient on how to take the medications prescribed today (Metformin, Farxiga, and iron pills).  However, discussed that it would be okay to  her medications and diabetes glucose monitoring device to bring to a visit early next week so we can show her how to take the medications in person.  Discussed side effects of Metformin and Farxiga.  Discussed side effects of iron pills.  Recommend that the patient be seen soon in clinic with me and with pharmacy to reteach how to monitor sugars and to review medications.  Additionally, she has got significant proteinuria, and would likely benefit from a small dose of an ACE inhibitor.  Decided against starting that medication today given we are already starting 3 medications.  At next visit:  -Review medications and teach blood glucose monitoring  -Discuss CGM  -Refer for diabetic eye exam  -Completed diabetic foot exam  -Discuss starting low-dose ACE inhibitor for proteinuria    - AMBULATORY ADULT DIABETES EDUCATOR REFERRAL; Future  - metFORMIN (GLUCOPHAGE-XR) 500 MG 24 hr tablet; Take 1 tablet (500 mg) by mouth daily (with dinner) for 7 days, THEN 2 tablets (1,000 mg) daily (with dinner) for 7 days, THEN 3 tablets (1,500 mg) daily (with dinner) for 7 days, THEN 4 tablets (2,000 mg) daily (with dinner).  - dapagliflozin (FARXIGA) 5 MG TABS tablet; Take 1 tablet (5 mg) by mouth daily  - blood glucose monitoring (NO BRAND SPECIFIED) meter device kit; Use to test blood sugar 4 times daily or as directed. Preferred blood glucose meter OR supplies to accompany: Blood Glucose Monitor Brands: per insurance.  - blood glucose (NO BRAND SPECIFIED) test  strip; Use to test blood sugar 4 times daily or as directed. To accompany: Blood Glucose Monitor Brands: per insurance.  - thin (NO BRAND SPECIFIED) lancets; Use with lanceting device. To accompany: Blood Glucose Monitor Brands: per insurance.  - alcohol swab prep pads; Use to swab area of injection/estela as directed.    Iron deficiency anemia, unspecified iron deficiency anemia type  Other iron deficiency anemia  Hx of anemia secondary to iron deficiency requiring blood transfusions. In 6/2018, Hemoglobinopathy/thalassemia cascade was negative however pathologist recommended repeating once iron stores had been replenished. No personal or family history of any bleeding disorders or other genetic issues. She does have a history of a vegetarian diet.  Discussed taking 1 iron tablet a day to begin with given she is also starting on Metformin at the same time; both which can cause nausea.  - ferrous sulfate (FEROSUL) 325 (65 Fe) MG tablet; Take 1 tablet (325 mg) by mouth 2 times daily (with meals)    Follow up in a few days for in person visit with medications and diabetes testing materials.     Dory Bundy MD  [facility] JEAN Ball is a 40 year old who presents for the following health issues     HPI     The visit today is to review lab results from earlier this week. Negative wet prep. Lipids normal. Other results below.     Elevated A1C with proteinuria - Hx of GDM. Recent A1C was 11.     Microcytic anemia (Hgb 8.8), MCV 67 - Ms. Adriana has not been taking iron supplements.     Vaginal itchiness - Negative yeast on wet prep at last visit, but reports improvement in vaginal itchiness after using fluconazole.     Review of Systems   See HPI above      Objective         Vitals:  No vitals were obtained today due to virtual visit.    Physical Exam   alert and no distress  PSYCH: Alert and oriented times 3; coherent speech, normal   rate and volume, able to articulate logical  thoughts, able   to abstract reason, no tangential thoughts, no hallucinations   or delusions  Her affect is normal  RESP: No cough, no audible wheezing, able to talk in full sentences  Remainder of exam unable to be completed due to telephone visits    ----- Service Performed and Documented by Resident or Fellow ------        Phone call duration: 20 minutes

## 2021-03-26 ENCOUNTER — VIRTUAL VISIT (OUTPATIENT)
Dept: FAMILY MEDICINE | Facility: CLINIC | Age: 41
End: 2021-03-26
Payer: COMMERCIAL

## 2021-03-26 ENCOUNTER — RECORDS - HEALTHEAST (OUTPATIENT)
Dept: ADMINISTRATIVE | Facility: OTHER | Age: 41
End: 2021-03-26

## 2021-03-26 DIAGNOSIS — D50.9 IRON DEFICIENCY ANEMIA, UNSPECIFIED IRON DEFICIENCY ANEMIA TYPE: ICD-10-CM

## 2021-03-26 DIAGNOSIS — E11.29 DIABETES MELLITUS WITH PROTEINURIA (H): Primary | ICD-10-CM

## 2021-03-26 DIAGNOSIS — R80.9 DIABETES MELLITUS WITH PROTEINURIA (H): Primary | ICD-10-CM

## 2021-03-26 DIAGNOSIS — D50.8 OTHER IRON DEFICIENCY ANEMIA: ICD-10-CM

## 2021-03-26 PROCEDURE — 99214 OFFICE O/P EST MOD 30 MIN: CPT | Mod: 95 | Performed by: STUDENT IN AN ORGANIZED HEALTH CARE EDUCATION/TRAINING PROGRAM

## 2021-03-26 RX ORDER — GLUCOSAMINE HCL/CHONDROITIN SU 500-400 MG
CAPSULE ORAL
Qty: 100 EACH | Refills: 3 | Status: SHIPPED | OUTPATIENT
Start: 2021-03-26 | End: 2022-06-23

## 2021-03-26 RX ORDER — LANCETS
EACH MISCELLANEOUS
Qty: 100 EACH | Refills: 6 | Status: SHIPPED | OUTPATIENT
Start: 2021-03-26 | End: 2022-06-23

## 2021-03-26 RX ORDER — FERROUS SULFATE 325(65) MG
325 TABLET ORAL 2 TIMES DAILY WITH MEALS
Qty: 180 TABLET | Refills: 3 | Status: SHIPPED | OUTPATIENT
Start: 2021-03-26 | End: 2022-06-23

## 2021-03-26 RX ORDER — DAPAGLIFLOZIN 5 MG/1
5 TABLET, FILM COATED ORAL DAILY
Qty: 90 TABLET | Refills: 3 | Status: SHIPPED | OUTPATIENT
Start: 2021-03-26 | End: 2022-06-23

## 2021-03-26 RX ORDER — METFORMIN HCL 500 MG
TABLET, EXTENDED RELEASE 24 HR ORAL
Qty: 360 TABLET | Refills: 3 | Status: SHIPPED | OUTPATIENT
Start: 2021-03-26 | End: 2022-06-23

## 2021-03-26 NOTE — PATIENT INSTRUCTIONS
03/30/21   Diabetes Education Referral    Diabetes educator at [facility] location  Phone: [phone]  Fax: [phone]    Faxed demographics, referral to [phone]. They will contact patient to schedule.     Teresa Hoffman

## 2021-03-26 NOTE — PROGRESS NOTES
Preceptor attestation:    I discussed the patient with the resident, and I spoke to the patient by telephone. I have verified the content of the note, which accurately reflects my assessment of the patient and the plan of care.    Supervising physician: Lulu Wilkins MD  [facility]

## 2021-03-26 NOTE — NURSING NOTE
Due to patient being non-English speaking/uses sign language, an  was used for this visit. Only for face-to-face interpretation by an external agency, date and length of interpretation can be found on the scanned worksheet.     name: Alicja Garzon  Agency: [employer]  Language: Tabatha   Telephone number: [phone]  Type of interpretation: Telephone, spoken

## 2021-03-28 NOTE — PROGRESS NOTES
Assessment & Plan   1. Diabetes mellitus with proteinuria (H)  Discussed possible side effects of the new medications she has just started. Today will start low dose lisinopril for proteinuria. Will plan to check BMP at next visit. Patient to bring in freestyle ady to learn how to use. Discussed checking her blood sugars at least 2 times a day in the meantime. She was provided a glucose log. At future visit, consider offering a statin, given diagnosis of diabetes.   - Follow up in 2 weeks with pharmacy and PCP  - lisinopril (ZESTRIL) 5 MG tablet; Take 1 tablet (5 mg) by mouth daily  Dispense: 90 tablet; Refill: 3  - Continuous Blood Gluc  (FREESTYLE ADY 14 DAY READER) JENNIFER; 1 each once for 1 dose Use to read blood sugars as per 's instructions.  Dispense: 1 each; Refill: 0  - Continuous Blood Gluc Sensor (FREESTYLE ADY 14 DAY SENSOR) MISC; 1 each every 14 days Change every 14 days.  Dispense: 2 each; Refill: 11    2. Iron deficiency anemia, unspecified iron deficiency anemia type  3. Other iron deficiency anemia  Will start iron supplements daily. Will try to take 2 pills a day, but will decrease down to 1 per day if she can not tolerate BID.   - senna-docusate (SENOKOT-S/PERICOLACE) 8.6-50 MG tablet; Take 1 tablet by mouth daily  Dispense: 90 tablet; Refill: 0    Return in about 2 weeks (around 4/12/2021).    Dory Bundy MD  [facility] JEAN Ball is a 40 year old who presents for the following health issues:        HPI     Here to discuss medications for diabetes management. She was last seen for a diabetes management visit in 2018. Review of chart shows she has received treatment inconsistently since diagnosis in 2014 due to insurance issues. Today she brings in medications and glucose monitor that we prescribed to her last week. She has started taking the medications.     Review of Systems   See HPI above      Objective    /88 (BP  Location: Left arm, Patient Position: Sitting, Cuff Size: Adult Regular)   Pulse 84   Temp 98.4  F (36.9  C) (Tympanic)   Resp 20   Wt 66 kg (145 lb 6.4 oz)   LMP 03/15/2021 (Approximate)   SpO2 99%   BMI 27.47 kg/m    Body mass index is 27.47 kg/m .  Physical Exam   GENERAL: healthy, alert and no distress  RESP: lungs clear to auscultation - no rales, rhonchi or wheezes  CV: regular rate and rhythm, normal S1 S2, no S3 or S4, no murmur, click or rub  MS: no gross musculoskeletal defects noted, no edema  PSYCH: mentation appears normal, affect normal/bright    ----- Service Performed and Documented by Resident or Fellow ------

## 2021-03-29 ENCOUNTER — TELEPHONE (OUTPATIENT)
Dept: FAMILY MEDICINE | Facility: CLINIC | Age: 41
End: 2021-03-29

## 2021-03-29 ENCOUNTER — RECORDS - HEALTHEAST (OUTPATIENT)
Dept: ADMINISTRATIVE | Facility: OTHER | Age: 41
End: 2021-03-29

## 2021-03-29 ENCOUNTER — OFFICE VISIT (OUTPATIENT)
Dept: FAMILY MEDICINE | Facility: CLINIC | Age: 41
End: 2021-03-29
Payer: COMMERCIAL

## 2021-03-29 VITALS
TEMPERATURE: 98.4 F | WEIGHT: 145.4 LBS | SYSTOLIC BLOOD PRESSURE: 126 MMHG | DIASTOLIC BLOOD PRESSURE: 88 MMHG | RESPIRATION RATE: 20 BRPM | BODY MASS INDEX: 27.47 KG/M2 | HEART RATE: 84 BPM | OXYGEN SATURATION: 99 %

## 2021-03-29 DIAGNOSIS — E11.29 DIABETES MELLITUS WITH PROTEINURIA (H): ICD-10-CM

## 2021-03-29 DIAGNOSIS — D50.8 OTHER IRON DEFICIENCY ANEMIA: ICD-10-CM

## 2021-03-29 DIAGNOSIS — R80.9 DIABETES MELLITUS WITH PROTEINURIA (H): ICD-10-CM

## 2021-03-29 DIAGNOSIS — D50.9 IRON DEFICIENCY ANEMIA, UNSPECIFIED IRON DEFICIENCY ANEMIA TYPE: Primary | ICD-10-CM

## 2021-03-29 LAB
CYTOLOGY CVX/VAG DOC THIN PREP: NORMAL
HIGH RISK?: NO
HPV REFLEX?: NORMAL
LAB AP ABNORMAL BLEEDING: NO
LAB AP BIRTH CONTROL/HORMONES: NORMAL
LAB AP CERVICAL APPEARANCE: NORMAL
LAB AP PATIENT STATUS: NORMAL
LAB AP PREVIOUS ABNL: NORMAL
LAB AP PREVIOUS NORMAL: 2013
LAST MENS PERIOD: NORMAL
PATH REPORT.COMMENTS IMP SPEC: NORMAL
PATH REPORT.COMMENTS IMP SPEC: NORMAL
SPECIMEN ADEQUACY:: NORMAL

## 2021-03-29 PROCEDURE — 99213 OFFICE O/P EST LOW 20 MIN: CPT | Mod: GC | Performed by: STUDENT IN AN ORGANIZED HEALTH CARE EDUCATION/TRAINING PROGRAM

## 2021-03-29 RX ORDER — LISINOPRIL 5 MG/1
5 TABLET ORAL DAILY
Qty: 90 TABLET | Refills: 3 | Status: SHIPPED | OUTPATIENT
Start: 2021-03-29 | End: 2022-06-23

## 2021-03-29 RX ORDER — AMOXICILLIN 250 MG
1 CAPSULE ORAL DAILY
Qty: 90 TABLET | Refills: 0 | Status: SHIPPED | OUTPATIENT
Start: 2021-03-29 | End: 2022-06-23

## 2021-03-29 RX ORDER — FLASH GLUCOSE SCANNING READER
1 EACH MISCELLANEOUS ONCE
Qty: 1 EACH | Refills: 0 | Status: SHIPPED | OUTPATIENT
Start: 2021-03-29 | End: 2021-03-29

## 2021-03-29 RX ORDER — FLASH GLUCOSE SENSOR
1 KIT MISCELLANEOUS
Qty: 2 EACH | Refills: 11 | Status: SHIPPED | OUTPATIENT
Start: 2021-03-29 | End: 2022-06-23

## 2021-03-29 NOTE — TELEPHONE ENCOUNTER
Diabetes Education Scheduling Outreach #1:    Call to patient to schedule. Left message with phone number to call to schedule.    Plan for 2nd outreach attempt within 1 week.    Paloma Eller  [facility]  Diabetes and Nutrition Scheduling

## 2021-03-29 NOTE — PROGRESS NOTES
Preceptor attestation:  Vital signs reviewed: /88 (BP Location: Left arm, Patient Position: Sitting, Cuff Size: Adult Regular)   Pulse 84   Temp 98.4  F (36.9  C) (Tympanic)   Resp 20   Wt 66 kg (145 lb 6.4 oz)   LMP 03/15/2021 (Approximate)   SpO2 99%   BMI 27.47 kg/m      Patient seen, evaluated, and discussed with the resident.  I have verified the content of the note, which accurately reflects my assessment of the patient and the plan of care.    Supervising physician: Lulu Wilkins MD  [facility]

## 2021-03-29 NOTE — NURSING NOTE
Due to patient being non-English speaking/uses sign language, an  was used for this visit. Only for face-to-face interpretation by an external agency, date and length of interpretation can be found on the scanned worksheet.     name: Mykel Corralse  Agency: [employer]  Language: Tabatha   Telephone number:   Type of interpretation: Face-to-face, spoken

## 2021-03-31 ENCOUNTER — COMMUNICATION - HEALTHEAST (OUTPATIENT)
Dept: ADMINISTRATIVE | Facility: CLINIC | Age: 41
End: 2021-03-31

## 2021-04-05 ENCOUNTER — TELEPHONE (OUTPATIENT)
Dept: FAMILY MEDICINE | Facility: CLINIC | Age: 41
End: 2021-04-05

## 2021-04-05 NOTE — TELEPHONE ENCOUNTER
"Per Pharmacy ([address]), \"Plan does not cover FREESTYLE LIANE 2 SENSOR AND FREESTYLE LIANE 2 READER DEVICE.  Please chip plan at [phone] to initiate prior authorization or call/fax pharmacy to change medication.  Patient ID # is [id number].\"  Dr. Bundy, please let me know if you need me to do anything.  Thank you.  IVELISSE Donnelly   "

## 2021-04-07 NOTE — TELEPHONE ENCOUNTER
Diabetes Education Scheduling Outreach #2:    Call to patient to schedule. Left message with phone number to call to schedule.    Paloma Eller  [facility]  Diabetes and Nutrition Scheduling

## 2021-04-09 NOTE — PROGRESS NOTES
Assessment & Plan     Diabetes mellitus with proteinuria (H)  A1c is 11, however may be falsely elevated due to anemia with a hemoglobin of 8.8.  She is tolerating the new medications.  Blood pressure is a little lower likely secondary to lisinopril and Farxiga.  However, patient is asymptomatic at this time.  Will not make any medication changes today, other than continue to titrate up on Metformin.  We will continue to monitor blood pressure closely.  Meeting with pharmacy to get started with the freestyle ady today.  Check a BMP today for electrolytes after starting lisinopril.  Follow-up in 2 weeks to check on blood sugars.   - Basic Metabolic Panel ([place])  Next steps:  -Schedule eye exam  -Diabetic foot exam  -Consider starting statin  -Consider starting GLP 1 vs insulin  -Recheck urine in 2 months for microalbuminuria    Iron deficiency anemia, unspecified iron deficiency anemia type  Discussed increasing iron supplementation to 2 times a day, if she is able to tolerate.  Plan to recheck hemoglobin in 6 months.  Hematology recommended rechecking for thalassemia once iron is repleted.        Return in about 2 weeks (around 2021) for with me.    Dory Bundy MD  [facility] JEAN Ball is a 40 year old who presents for the following health issues:    HPI     Was recently restarted on metformin and farxiga for diabetes, lisinopril for microalbuminuria, and iron supplementation for anemia. Today she reports she is tolerating the new medications. For metformin - is currently taking 2 pills at dinner. For iron supplementation, only take one per day due to constipation. After taking her medication her medications her blood sugars have been lower.     Checking blood sugars two times per day:  - Fastin-150s  - After meal: 170-200s    No sugars less than 90.  Denies dizziness and lightheadedness when standing up.  Noticed chills cold fatigue to the first week  after starting this medication, but has been better over the last week.    BP Readings from Last 2 Encounters:   04/12/21 98/65   03/29/21 126/88     Hemoglobin A1C (%)   Date Value   03/23/2021 11.0 (H)   05/14/2020 5.9 (H)     LDL Cholesterol Calculated (mg/dL)   Date Value   03/23/2021 107   05/19/2016 101       Review of Systems   See HPI above      Objective    BP 98/65 (BP Location: Left arm, Patient Position: Sitting, Cuff Size: Adult Regular)   Pulse 69   Temp 97.7  F (36.5  C) (Oral)   Resp 18   Wt 64.6 kg (142 lb 6.4 oz)   LMP 03/15/2021 (Approximate)   SpO2 99%   BMI 26.91 kg/m    Body mass index is 26.91 kg/m .  Physical Exam   GENERAL: healthy, alert and no distress  RESP: lungs clear to auscultation - no rales, rhonchi or wheezes  CV: regular rate and rhythm, normal S1 S2, no S3 or S4, no murmur, click or rub, no peripheral edema and peripheral pulses strong  ABDOMEN: soft, nontender, no hepatosplenomegaly, no masses and bowel sounds normal  MS: no gross musculoskeletal defects noted, no edema    ----- Service Performed and Documented by Resident or Fellow ------

## 2021-04-12 ENCOUNTER — OFFICE VISIT (OUTPATIENT)
Dept: PHARMACY | Facility: CLINIC | Age: 41
End: 2021-04-12
Payer: COMMERCIAL

## 2021-04-12 ENCOUNTER — OFFICE VISIT (OUTPATIENT)
Dept: FAMILY MEDICINE | Facility: CLINIC | Age: 41
End: 2021-04-12
Payer: COMMERCIAL

## 2021-04-12 VITALS
SYSTOLIC BLOOD PRESSURE: 98 MMHG | OXYGEN SATURATION: 99 % | WEIGHT: 142.4 LBS | RESPIRATION RATE: 18 BRPM | BODY MASS INDEX: 26.91 KG/M2 | DIASTOLIC BLOOD PRESSURE: 65 MMHG | TEMPERATURE: 97.7 F | HEART RATE: 69 BPM

## 2021-04-12 VITALS
SYSTOLIC BLOOD PRESSURE: 98 MMHG | WEIGHT: 142.4 LBS | BODY MASS INDEX: 26.91 KG/M2 | HEART RATE: 69 BPM | DIASTOLIC BLOOD PRESSURE: 65 MMHG | TEMPERATURE: 97.7 F | RESPIRATION RATE: 18 BRPM | OXYGEN SATURATION: 99 %

## 2021-04-12 DIAGNOSIS — E11.29 DIABETES MELLITUS WITH PROTEINURIA (H): Primary | ICD-10-CM

## 2021-04-12 DIAGNOSIS — R80.9 DIABETES MELLITUS WITH PROTEINURIA (H): Primary | ICD-10-CM

## 2021-04-12 DIAGNOSIS — D50.9 IRON DEFICIENCY ANEMIA, UNSPECIFIED IRON DEFICIENCY ANEMIA TYPE: ICD-10-CM

## 2021-04-12 LAB
BUN SERPL-MCNC: 13.5 MG/DL (ref 7–19)
CALCIUM SERPL-MCNC: 9.5 MG/DL (ref 8.5–10.1)
CHLORIDE SERPLBLD-SCNC: 102.9 MMOL/L (ref 98–110)
CO2 SERPL-SCNC: 24.3 MMOL/L (ref 20–32)
CREAT SERPL-MCNC: 0.5 MG/DL (ref 0.5–1)
GFR SERPL CREATININE-BSD FRML MDRD: >90 ML/MIN/1.7 M2
GLUCOSE SERPL-MCNC: 114 MG'DL (ref 70–99)
POTASSIUM SERPL-SCNC: 3.9 MMOL/L (ref 3.2–4.6)
SODIUM SERPL-SCNC: 135.6 MMOL/L (ref 132–142)

## 2021-04-12 PROCEDURE — 36415 COLL VENOUS BLD VENIPUNCTURE: CPT | Performed by: STUDENT IN AN ORGANIZED HEALTH CARE EDUCATION/TRAINING PROGRAM

## 2021-04-12 PROCEDURE — 99605 MTMS BY PHARM NP 15 MIN: CPT | Performed by: PHARMACIST

## 2021-04-12 PROCEDURE — 99214 OFFICE O/P EST MOD 30 MIN: CPT | Mod: GC | Performed by: STUDENT IN AN ORGANIZED HEALTH CARE EDUCATION/TRAINING PROGRAM

## 2021-04-12 PROCEDURE — 80048 BASIC METABOLIC PNL TOTAL CA: CPT | Performed by: STUDENT IN AN ORGANIZED HEALTH CARE EDUCATION/TRAINING PROGRAM

## 2021-04-12 PROCEDURE — 99607 MTMS BY PHARM ADDL 15 MIN: CPT | Performed by: PHARMACIST

## 2021-04-12 NOTE — NURSING NOTE
Due to patient being non-English speaking/uses sign language, an  was used for this visit. Only for face-to-face interpretation by an external agency, date and length of interpretation can be found on the scanned worksheet.     name: Nu  Agency: [employer]  Language: Tabatha   Telephone number: ID: [phone]  Type of interpretation: Telemedicine, spoken

## 2021-04-12 NOTE — PROGRESS NOTES
Medication Therapy Management (MTM) Encounter    ASSESSMENT/PLAN:                            Medication Adherence/Access: No issues identified    Type 2 Diabetes: Tolerating new diabetes meds well. BS and symptoms improved. She is behind on her metformin titration schedule but she knows to titrate to 3 tabs daily on Sunday, then to 4 tabs daily on the next Sunday.  She needs to learn how to use her new FreeStyle Alana.    Iron deficiency anemia: Uncontrolled. Ideally she should be on twice daily iron, but once daily is better than stopping altogether if she can only tolerate once daily. She would benefit from attempting to increase again to twice daily.    Constipation:  Stable    PLAN:     Set up her new Freestyle Alana meter    Educated her on proper use of her new Freestyle Alana and what the readings/arrows/symbols mean, and that there is a lag behind the blood sugar reading, and when to confirm with fingerstick blood glucose    She placed the first sensor herself with my help in clinic today    Continue titrating up metformin by 1 tab once daily every week on Sundays until reach 4 tabs once daily    Educated her to bring in Freestyle Alana reader to each clinic visit.    Dr. Bundy educated her to try to increase her iron to twice daily if possible    Will follow up in 2 weeks with both Dr. Bundy and myself.    Medication issues to be addressed at a future visit      Help her change her Freestyle Alana sensor if needed    Increase Farxiga to 10mgd    Make sure has titrated up to metformin ER 2g daily    Start statin    Assess if she was able to increase/tolerate iron twice daily      SUBJECTIVE/OBJECTIVE:                           Lizzy Wright is a 40 year old female seen for an initial visit. She was referred to me from Dr. Bundy. Today's visit is a co-visit with Dr. Bundy.  was present during today's visit. Patient saw provider prior to our visit today.     Reason for visit: MTM, learn how to use  "Freestyle Alana.    Allergies/ADRs: Reviewed in chart and None  Tobacco: She reports that she has never smoked. She has never used smokeless tobacco.  Alcohol: none  Activity: not assessed  Past Medical History: Reviewed in chart  Contraception: s/p tubal ligation 2013    Medication Adherence/Access:   Doesn't know names of meds; knows by instructions.  No pill box; takes out of bottles  Oldest daughter helps; speaks/reads English    Type 2 Diabetes:  Currently taking Farxiga 5mg/d, metformin ER 1g daily (titrated to this dose on Sun (yesterday)- forgot to titrate earlier. Patient is experiencing the following side effects: GI upset if she takes her meds on an empty stomach, but she is fine when she takes meds with food.  Blood sugar monitoring:   Checking home BS BID  AM fastins-150s  PM: sometimes before dinner (160-180)  After dinner: 177-180 (once over 200)  (improved)  She brings in her new FreeStyle Alana to learn how to use it.,    Symptoms of high BS: Yes- thirsty, drink a lot of water, urinate frequently (improved since starting meds)  Symptoms of low BS: dizzy (but also has low BP)- checks BS then and \"low\" for her (120s)    Aspirin: Not taking due to age  Statin: No - with many recent med changes,   ACEi/ARB: Yes: started on lisinopril 5mg/d at last visit. SCr & potassium stable today after starting    Urine Albumin:   Lab Results   Component Value Date    UMALCR 724.5 (H) 2021      Lab Results   Component Value Date    A1C 11.0 2021    A1C 5.9 2020    A1C 5.3 2018    A1C 6.5 2016    A1C 13.4 2014     Most Recent Immunizations   Administered Date(s) Administered     Flu, Unspecified 2011     Hep B, Peds or Adolescent 2011     HepB 2011     HepB-Adult 2010     Influenza (IIV3) PF 10/18/2013     Influenza Vaccine, 6+MO IM (QUADRIVALENT W/PRESERVATIVES) 2015     MMR 2011     Pneumococcal 23 valent 2016     TD (ADULT, 7+) " 07/20/2012     TDAP Vaccine (Boostrix) 10/18/2013     Td (Adult), Adsorbed 07/20/2012     Tdap (Adacel,Boostrix) 04/25/2011     Varicella 06/20/2018       Iron deficiency anemia: currently taking iron 325mg once daily. Is prescribed as twice daily, but she had constipation on BID dosing so she decreased to once daily. No adverse effects on this dose.    CBC RESULTS:   Recent Labs   Lab Test 03/23/21  1043   HGB 8.8*   HCT 31.9*   MCV 67*   MCH 18.4*   MCHC 27.6*   RDW 17.4*     Ferritin   Date Value Ref Range Status   05/14/2020 <2 (L) 10 - 130 ng/mL Final     Iron   Date Value Ref Range Status   05/14/2020 11 (L) 42 - 175 ug/dL Final       Constipation:  Takes senna doc one tab daily. No issues; frequency of BM satisfactory.    Today's Vitals: BP 98/65 (BP Location: Left arm, Patient Position: Sitting, Cuff Size: Adult Regular)   Pulse 69   Temp 97.7  F (36.5  C) (Oral)   Resp 18   Wt 142 lb 6.4 oz (64.6 kg)   LMP 03/15/2021 (Approximate)   SpO2 99%   BMI 26.91 kg/m          I spent 40 minutes with this patient today. All changes were made via collaborative practice agreement with Dr. Bundy. A copy of the visit note was not provided to the patient's primary care provider as we discussed in clinic today.    The patient was given a summary of these recommendations. See Provider note/AVS from today.     Almaz Aguero, Pharm.D.      Due to patient being non-English speaking/uses sign language, an  was used for this visit. Only for face-to-face interpretation by an external agency, date and length of interpretation can be found on the scanned worksheet.      name: [facility] [place]  Agency: [employer]  Language: Tabatha   Telephone number: ID: [phone]  Type of interpretation: Telemedicine, spoken

## 2021-04-12 NOTE — RESULT ENCOUNTER NOTE
Hello November,    Please call the following patient with the results below. Thank you!    Juvencio Wright,    I hope you're well. I wanted to communicate with you the results of the tests that we did.     Which is good news! The laboratory results were normal and show your electrolytes are stable. Please follow up with Dr. Bundy as scheduled on 4/26/21.     Thank you!  Dory Bundy MD PGY2

## 2021-04-12 NOTE — PROGRESS NOTES
Preceptor Attestation:    I discussed the patient with the resident and evaluated the patient in person. I have verified the content of the note, which accurately reflects my assessment of the patient and the plan of care.   Supervising Physician:  Josh Plunkett MD.

## 2021-04-26 ENCOUNTER — OFFICE VISIT (OUTPATIENT)
Dept: PHARMACY | Facility: CLINIC | Age: 41
End: 2021-04-26
Payer: COMMERCIAL

## 2021-04-26 ENCOUNTER — OFFICE VISIT (OUTPATIENT)
Dept: FAMILY MEDICINE | Facility: CLINIC | Age: 41
End: 2021-04-26
Payer: COMMERCIAL

## 2021-04-26 VITALS
RESPIRATION RATE: 16 BRPM | TEMPERATURE: 98.8 F | SYSTOLIC BLOOD PRESSURE: 125 MMHG | WEIGHT: 142 LBS | HEIGHT: 61 IN | DIASTOLIC BLOOD PRESSURE: 84 MMHG | OXYGEN SATURATION: 98 % | BODY MASS INDEX: 26.81 KG/M2 | HEART RATE: 88 BPM

## 2021-04-26 DIAGNOSIS — R80.9 DIABETES MELLITUS WITH PROTEINURIA (H): Primary | ICD-10-CM

## 2021-04-26 DIAGNOSIS — K59.00 CONSTIPATION: ICD-10-CM

## 2021-04-26 DIAGNOSIS — D50.9 IRON DEFICIENCY ANEMIA, UNSPECIFIED IRON DEFICIENCY ANEMIA TYPE: ICD-10-CM

## 2021-04-26 DIAGNOSIS — E11.29 DIABETES MELLITUS WITH PROTEINURIA (H): Primary | ICD-10-CM

## 2021-04-26 DIAGNOSIS — N18.1 CKD STAGE G1/A3, GFR > 90 AND ALBUMIN CREATININE RATIO >300 MG/G: ICD-10-CM

## 2021-04-26 DIAGNOSIS — H53.8 BLURRED VISION: ICD-10-CM

## 2021-04-26 PROCEDURE — 99606 MTMS BY PHARM EST 15 MIN: CPT | Performed by: PHARMACIST

## 2021-04-26 PROCEDURE — 99607 MTMS BY PHARM ADDL 15 MIN: CPT | Performed by: PHARMACIST

## 2021-04-26 PROCEDURE — 99214 OFFICE O/P EST MOD 30 MIN: CPT | Performed by: FAMILY MEDICINE

## 2021-04-26 ASSESSMENT — MIFFLIN-ST. JEOR: SCORE: 1261.49

## 2021-04-26 NOTE — PROGRESS NOTES
Medication Therapy Management (MTM) Encounter    ASSESSMENT/PLAN:                            Medication Adherence/Access: Advised use of pill boxes to help med adherence and was given a pillbox. Pharmacy team set up first week for the patient.     Type 2 Diabetes with proteinuria:  Controlled. Patient CGM readings are all within target. Patients recent flucuations in A1C may also be confounded by patient's anemia. Patient would benefit from continuing titration of the metformin before considering increasing the Farxiga so not making too many changes at once. Patient is also indicated for a statin, but given changes made to day, patient would benefit from delaying initiation of the statin.    Iron Deficiency Anemia: Uncontrolled. Patient may benefit from taking ferrous sulfate 325mg twice daily every other day. Recent literature suggests that every other day dosing may be superior to daily dosing in terms of absorption.        Constipation: Stable. Continue current medications.    PLAN:   1. Apply new Alana sensor today.   2. Start taking ferrous sulfate 325mg by mouth twice daily every other day.  3. Continue increasing metformin as scheduled to reach target dose of 4 tablets daily (next step up next Sun May 2.    Will follow up in 2 weeks with Dr. Bundy; will see pt then.    Medication issues to be addressed at a future visit      Start a moderate-intensity statin (rosuvastatin 5-10mg or atorvastatin 10-20mg)    Increase Farxiga to 10mg daily even if her BS are in range as this is the dose for diabetic kidney disease/albuminuria >300mg/g, which she has    Make sure she has titrated up to metformin ER 2g daily.    Assess if she was able to tolerate taking iron twice daily every other day and if constipation is still controlled.    An alternative to consider to help iron absorption would be using low dose vitamin C to boost absorption. However, too much Vitamin C (>1000mg/d) may interfere with patient's CGM, and  other pill may not be desirable. Doses up to 500mg/d vit C have minimal interference with FreeStyle Alana     If constipation worsens with increasing iron to twice daily dosing, consider increasing dose of senna-doc to help alleviate constipation if constipation worsens with the change in iron replenishment dosing.      SUBJECTIVE/OBJECTIVE:                           Lizzy Wright is a 40 year old female seen for a follow-up visit. She was referred to me from Dr. Bundy. Today's visit is a co-visit with Dr. Murdock. Patient saw provider prior to our visit today. Patient is seeing provider after our visit today. Today's visit is a follow-up MTM visit from Dr. Aguero on 4/12/2021.     Reason for visit: Follow-Up.    Allergies/ADRs: Reviewed in chart  Tobacco: She reports that she has never smoked. She has never used smokeless tobacco.    Alcohol: Not assessed  Activity: not assessed  Past Medical History: Reviewed in chart    Medication Adherence/Access: Patient takes medications directly from bottles.  Patient takes medications 1 time(s) per day.     Type 2 Diabetes with proteinuria:  Currently taking metformin ER 1500mg daily (increased from 1000mg daily yesterday), and Farxiga 5mg daily. Patient reports that she feels like the Farxiga is helping her lower her blood sugars. Her CGM did not demonstrate any hypoglycemic events.  Blood sugar monitoring: Continuous Glucose Monitor. Ranges below   Patient was also supposed to bring in sensor today for help with placing her 2nd sensor but patient states that she has not gotten it from the pharmacy and did not realize she got 2 sensors when she filled it the first time. Patient stated that she remembers how to put it on based on our instruction at the last visit.  Symptoms of low blood sugar? shaky, dizzy, and hungry (but no low readings seen). Frequency of lows- 2 times in the past 2 weeks per patient.   Symptoms of high blood sugar? Headache, polydipsia and polyuria. Frequency  of highs- about 2-3 days in past 2 weeks  Eye exam: not assessed  Foot exam: not assessed  Diet/Exercise: not assessed  Aspirin: Not taking due to low ASCVD  Statin: No   ACEi/ARB: Yes: Lisinopril 5mg daily for proteinuria        Urine Albumin:   Lab Results   Component Value Date    UMALCR 724.5 (H) 03/23/2021      Lab Results   Component Value Date    A1C 11.0 03/23/2021    A1C 5.9 05/14/2020    A1C 5.3 06/20/2018    A1C 6.5 05/19/2016    A1C 13.4 11/12/2014     BP Readings from Last 6 Encounters:   04/26/21 125/84   04/12/21 98/65   04/12/21 98/65   03/29/21 126/88   03/23/21 125/85   06/28/18 115/75     Most Recent Immunizations   Administered Date(s) Administered     Flu, Unspecified 11/01/2011     Hep B, Peds or Adolescent 04/25/2011     HepB 04/25/2011     HepB-Adult 06/08/2010     Influenza (IIV3) PF 10/18/2013     Influenza Vaccine, 6+MO IM (QUADRIVALENT W/PRESERVATIVES) 11/12/2015     MMR 04/25/2011     Pneumococcal 23 valent 05/19/2016     TD (ADULT, 7+) 07/20/2012     TDAP Vaccine (Boostrix) 10/18/2013     Td (Adult), Adsorbed 07/20/2012     Tdap (Adacel,Boostrix) 04/25/2011     Varicella 06/20/2018     Iron Deficiency Anemia: Patient reports that she is currently only taking ferrous sulfate 325mg once daily. The instructions are written twice daily but she states that Dr. Bundy told her she could take 325mg daily if she was having significant constipation. She reports constipation was what prompted her to decrease to once daily. No other concerns noted.    Constipation: Patient currently taking sennosides-docusate 8.6-50mg once daily. She reports that she has about one bowel movement per day and they are soft.    Today's Vitals: LMP 04/21/2021 (Exact Date)         I spent 30 minutes with this patient today. All changes were made via collaborative practice agreement with Dr. Murdock. A copy of the visit note was provided to the patient's primary care provider.    The patient was given a summary of these  recommendations. See Provider note/AVS from today.     Deshaun Abad, 4th Year Pharmacy Student    I was present with the pharmacy student who participated in the service and in the documentation of this note. I have verified the history, personally performed the medical decision making, and have verified the content of the note, which accurately reflects my assessment of the patient and the plan of care.   Almaz Aguero [facility], PharmD     Due to patient being non-English speaking/uses sign language, an  was used for this visit. Only for face-to-face interpretation by an external agency, date and length of interpretation can be found on the scanned worksheet.     name: ZHENG Sethi  Agency: [employer]  Language: Tabatha   Telephone number: [phone]  Type of interpretation: Telephone, spoken       Medication Therapy Recommendations  Anemia, iron deficiency    Current Medication: ferrous sulfate (FEROSUL) 325 (65 Fe) MG tablet   Rationale: Dose too low - Dosage too low - Effectiveness   Recommendation: Increase Frequency - ferrous sulfate 325 (65 Fe) MG tablet - take 1 tablet by mouth twice daily every other day   Status: Accepted per CPA         Diabetes mellitus with proteinuria (H)    Current Medication: metFORMIN (GLUCOPHAGE-XR) 500 MG 24 hr tablet   Rationale: Dose too low - Dosage too low - Effectiveness   Recommendation: Increase Dose - metFORMIN 500 MG 24 hr tablet   Status: Accepted per CPA

## 2021-04-26 NOTE — PROGRESS NOTES
Lizzy was seen today for recheck medication and medication reconciliation.    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    Due to her blurred vision, I have placed a referral to ophthalmology and walked her to the referral office to ensure that this is set up without confusion.    It appears that she missed a referral to a dietitian.  I have given her dietary advice in the Tabatha language and will see if we can reschedule her with a dietitian.    Her PCP wants to see her again in 2 weeks.  Her labs should get rechecked including an anemia work-up.  Review of her records reveals quite marked variability in both her A1c and her hemoglobin.  Patient herself says she does not have heavy periods and therefore exact cause for iron deficiency anemia remains uncertain and should be worked up further.    In the future patient needs to be started on a statin but elected not to make that change today.    In addition would recommend a renal ultrasound to evaluate her proteinuria.    Total visit time with patient was 25 mins, all of which was face to face MD time, and over 50% of this time was spent in counseling and coordination of care.  Including post-encounter documentation and orders, total encounter time was 32 mins.      Subjective:  This is a 40-year-old not previously known to me who is scheduled with me because she missed an appointment with her PCP.  This was a follow-up visit primarily for diabetes and anemia.  She had been started with a CGM meter 2 weeks ago.  In addition she was advised to start taking increasing  "doses of Metformin to a maximum of 2000 mg daily.  Today she is telling me that she is taking 1500 mg daily.  In addition she was started on dapagliflozin (Farxiga) 5mgs daily.  She brings with her her medications.  She is taking iron supplement just 1 daily although labile says twice daily.    Today she voices no particular concerns.  She says that she is not having any GI side effects either from Metformin or iron.    ROS:  Eyes: She does say that her vision is blurred and she is agreeable to seeing an eye doctor  Neuro: She denies any numbness or tingling in her feet    Objective:  /84 (BP Location: Left arm, Patient Position: Sitting, Cuff Size: Adult Regular)   Pulse 88   Temp 98.8  F (37.1  C) (Oral)   Resp 16   Ht 1.549 m (5' 1\")   Wt 64.4 kg (142 lb)   LMP 04/21/2021 (Exact Date)   SpO2 98%   Breastfeeding No   BMI 26.83 kg/m    Her blood pressure is satisfactory, her BMI is overweight  She is not clinically anemic  She has no goiter  Heart sounds are normal and pulse is regular  Lungs are clear to auscultation  Abdomen is soft without tenderness or guarding no organomegaly  She has no lower extremity edema    I spent 10 minutes reviewing her medications and reconciling these and discussing them with Pharm.D.      "

## 2021-04-26 NOTE — NURSING NOTE
Due to patient being non-English speaking/uses sign language, an  was used for this visit. Only for face-to-face interpretation by an external agency, date and length of interpretation can be found on the scanned worksheet.     name: ZHENG Sethi  Agency: [employer]  Language: Tabatha   Telephone number: [phone]  Type of interpretation: Telephone, spoken

## 2021-05-15 ENCOUNTER — IMMUNIZATION (OUTPATIENT)
Dept: FAMILY MEDICINE | Facility: CLINIC | Age: 41
End: 2021-05-15
Payer: COMMERCIAL

## 2021-05-15 PROCEDURE — 0001A PR COVID VAC PFIZER DIL RECON 30 MCG/0.3 ML IM: CPT

## 2021-05-15 PROCEDURE — 91300 PR COVID VAC PFIZER DIL RECON 30 MCG/0.3 ML IM: CPT

## 2021-06-05 ENCOUNTER — IMMUNIZATION (OUTPATIENT)
Dept: FAMILY MEDICINE | Facility: CLINIC | Age: 41
End: 2021-06-05
Payer: COMMERCIAL

## 2021-06-05 PROCEDURE — 0002A PR COVID VAC PFIZER DIL RECON 30 MCG/0.3 ML IM: CPT

## 2021-06-05 PROCEDURE — 91300 PR COVID VAC PFIZER DIL RECON 30 MCG/0.3 ML IM: CPT

## 2021-06-16 NOTE — TELEPHONE ENCOUNTER
Records received . 3/30/2021 10:11am inside DM consult  [place], [phone], Dr emerald Bundy, DX: DM

## 2022-01-12 ENCOUNTER — IMMUNIZATION (OUTPATIENT)
Dept: FAMILY MEDICINE | Facility: CLINIC | Age: 42
End: 2022-01-12
Payer: COMMERCIAL

## 2022-01-12 DIAGNOSIS — Z23 NEED FOR PROPHYLACTIC VACCINATION AND INOCULATION AGAINST INFLUENZA: Primary | ICD-10-CM

## 2022-01-12 PROCEDURE — 99207 PR NO CHARGE LOS: CPT

## 2022-01-12 PROCEDURE — 90686 IIV4 VACC NO PRSV 0.5 ML IM: CPT

## 2022-01-12 PROCEDURE — 0054A COVID-19,PF,PFIZER (12+ YRS): CPT

## 2022-01-12 PROCEDURE — 91305 COVID-19,PF,PFIZER (12+ YRS): CPT

## 2022-01-12 PROCEDURE — 90471 IMMUNIZATION ADMIN: CPT

## 2022-06-23 ENCOUNTER — OFFICE VISIT (OUTPATIENT)
Dept: PHARMACY | Facility: CLINIC | Age: 42
End: 2022-06-23
Payer: COMMERCIAL

## 2022-06-23 ENCOUNTER — OFFICE VISIT (OUTPATIENT)
Dept: FAMILY MEDICINE | Facility: CLINIC | Age: 42
End: 2022-06-23
Payer: COMMERCIAL

## 2022-06-23 VITALS
OXYGEN SATURATION: 100 % | BODY MASS INDEX: 25.27 KG/M2 | TEMPERATURE: 98.2 F | RESPIRATION RATE: 20 BRPM | DIASTOLIC BLOOD PRESSURE: 84 MMHG | WEIGHT: 148 LBS | HEART RATE: 85 BPM | SYSTOLIC BLOOD PRESSURE: 123 MMHG | HEIGHT: 64 IN

## 2022-06-23 DIAGNOSIS — D50.9 IRON DEFICIENCY ANEMIA, UNSPECIFIED IRON DEFICIENCY ANEMIA TYPE: Primary | ICD-10-CM

## 2022-06-23 DIAGNOSIS — R80.9 DIABETES MELLITUS WITH PROTEINURIA (H): ICD-10-CM

## 2022-06-23 DIAGNOSIS — Z00.00 ROUTINE GENERAL MEDICAL EXAMINATION AT A HEALTH CARE FACILITY: ICD-10-CM

## 2022-06-23 DIAGNOSIS — D50.8 OTHER IRON DEFICIENCY ANEMIA: Primary | ICD-10-CM

## 2022-06-23 DIAGNOSIS — E11.29 DIABETES MELLITUS WITH PROTEINURIA (H): ICD-10-CM

## 2022-06-23 DIAGNOSIS — D50.9 IRON DEFICIENCY ANEMIA, UNSPECIFIED IRON DEFICIENCY ANEMIA TYPE: ICD-10-CM

## 2022-06-23 DIAGNOSIS — D50.8 OTHER IRON DEFICIENCY ANEMIA: ICD-10-CM

## 2022-06-23 LAB
ANION GAP SERPL CALCULATED.3IONS-SCNC: 7 MMOL/L (ref 5–18)
BUN SERPL-MCNC: 9 MG/DL (ref 8–22)
CALCIUM SERPL-MCNC: 8.9 MG/DL (ref 8.5–10.5)
CHLORIDE BLD-SCNC: 109 MMOL/L (ref 98–107)
CHOLEST SERPL-MCNC: 129 MG/DL
CO2 SERPL-SCNC: 24 MMOL/L (ref 22–31)
CREAT SERPL-MCNC: 0.6 MG/DL (ref 0.6–1.1)
CREAT UR-MCNC: 35 MG/DL
ERYTHROCYTE [DISTWIDTH] IN BLOOD BY AUTOMATED COUNT: 21.4 % (ref 10–15)
FASTING STATUS PATIENT QL REPORTED: ABNORMAL
GFR SERPL CREATININE-BSD FRML MDRD: >90 ML/MIN/1.73M2
GLUCOSE BLD-MCNC: 146 MG/DL (ref 70–125)
HBA1C MFR BLD: 7.2 % (ref 0–5.6)
HCT VFR BLD AUTO: 23.2 % (ref 35–47)
HDLC SERPL-MCNC: 31 MG/DL
HGB BLD-MCNC: 5.9 G/DL (ref 11.7–15.7)
HOLD SPECIMEN: NORMAL
LDLC SERPL CALC-MCNC: 59 MG/DL
MCH RBC QN AUTO: 15.7 PG (ref 26.5–33)
MCHC RBC AUTO-ENTMCNC: 25.4 G/DL (ref 31.5–36.5)
MCV RBC AUTO: 62 FL (ref 78–100)
MICROALBUMIN UR-MCNC: 13.05 MG/DL (ref 0–1.99)
MICROALBUMIN/CREAT UR: 372.9 MG/G CR
PLATELET # BLD AUTO: 299 10E3/UL (ref 150–450)
POTASSIUM BLD-SCNC: 3.8 MMOL/L (ref 3.5–5)
RBC # BLD AUTO: 3.76 10E6/UL (ref 3.8–5.2)
SODIUM SERPL-SCNC: 140 MMOL/L (ref 136–145)
TRIGL SERPL-MCNC: 194 MG/DL
WBC # BLD AUTO: 5.8 10E3/UL (ref 4–11)

## 2022-06-23 PROCEDURE — 83540 ASSAY OF IRON: CPT

## 2022-06-23 PROCEDURE — 85027 COMPLETE CBC AUTOMATED: CPT

## 2022-06-23 PROCEDURE — 36415 COLL VENOUS BLD VENIPUNCTURE: CPT

## 2022-06-23 PROCEDURE — 82728 ASSAY OF FERRITIN: CPT

## 2022-06-23 PROCEDURE — 99214 OFFICE O/P EST MOD 30 MIN: CPT | Mod: GC

## 2022-06-23 PROCEDURE — 80048 BASIC METABOLIC PNL TOTAL CA: CPT

## 2022-06-23 PROCEDURE — 82043 UR ALBUMIN QUANTITATIVE: CPT

## 2022-06-23 PROCEDURE — 83036 HEMOGLOBIN GLYCOSYLATED A1C: CPT

## 2022-06-23 PROCEDURE — 99607 MTMS BY PHARM ADDL 15 MIN: CPT | Performed by: PHARMACIST

## 2022-06-23 PROCEDURE — 80061 LIPID PANEL: CPT

## 2022-06-23 PROCEDURE — 83550 IRON BINDING TEST: CPT

## 2022-06-23 PROCEDURE — 99605 MTMS BY PHARM NP 15 MIN: CPT | Performed by: PHARMACIST

## 2022-06-23 RX ORDER — EPINEPHRINE 1 MG/ML
0.3 INJECTION, SOLUTION, CONCENTRATE INTRAVENOUS EVERY 5 MIN PRN
Status: CANCELLED | OUTPATIENT
Start: 2022-06-23

## 2022-06-23 RX ORDER — ALBUTEROL SULFATE 0.83 MG/ML
2.5 SOLUTION RESPIRATORY (INHALATION)
Status: CANCELLED | OUTPATIENT
Start: 2022-06-23

## 2022-06-23 RX ORDER — AMOXICILLIN 250 MG
1 CAPSULE ORAL DAILY
Qty: 90 TABLET | Refills: 3 | Status: SHIPPED | OUTPATIENT
Start: 2022-06-23 | End: 2024-03-25

## 2022-06-23 RX ORDER — DIPHENHYDRAMINE HYDROCHLORIDE 50 MG/ML
50 INJECTION INTRAMUSCULAR; INTRAVENOUS
Status: CANCELLED
Start: 2022-06-23

## 2022-06-23 RX ORDER — ACETAMINOPHEN 325 MG/1
325-650 TABLET ORAL EVERY 6 HOURS PRN
Qty: 100 TABLET | Refills: 1 | Status: SHIPPED | OUTPATIENT
Start: 2022-06-23

## 2022-06-23 RX ORDER — LANCETS
EACH MISCELLANEOUS
Qty: 100 EACH | Refills: 3 | Status: SHIPPED | OUTPATIENT
Start: 2022-06-23 | End: 2022-12-16

## 2022-06-23 RX ORDER — HEPARIN SODIUM,PORCINE 10 UNIT/ML
5 VIAL (ML) INTRAVENOUS
Status: CANCELLED | OUTPATIENT
Start: 2022-06-23

## 2022-06-23 RX ORDER — METFORMIN HCL 500 MG
TABLET, EXTENDED RELEASE 24 HR ORAL
Qty: 360 TABLET | Refills: 3 | Status: SHIPPED | OUTPATIENT
Start: 2022-06-23 | End: 2023-04-24 | Stop reason: DRUGHIGH

## 2022-06-23 RX ORDER — METHYLPREDNISOLONE SODIUM SUCCINATE 125 MG/2ML
125 INJECTION, POWDER, LYOPHILIZED, FOR SOLUTION INTRAMUSCULAR; INTRAVENOUS
Status: CANCELLED
Start: 2022-06-23

## 2022-06-23 RX ORDER — NALOXONE HYDROCHLORIDE 0.4 MG/ML
0.2 INJECTION, SOLUTION INTRAMUSCULAR; INTRAVENOUS; SUBCUTANEOUS
Status: CANCELLED | OUTPATIENT
Start: 2022-06-23

## 2022-06-23 RX ORDER — DAPAGLIFLOZIN 5 MG/1
5 TABLET, FILM COATED ORAL DAILY
Qty: 90 TABLET | Refills: 3 | Status: SHIPPED | OUTPATIENT
Start: 2022-06-23 | End: 2022-12-16

## 2022-06-23 RX ORDER — ALBUTEROL SULFATE 90 UG/1
1-2 AEROSOL, METERED RESPIRATORY (INHALATION)
Status: CANCELLED
Start: 2022-06-23

## 2022-06-23 RX ORDER — FERROUS SULFATE 325(65) MG
325 TABLET ORAL 2 TIMES DAILY WITH MEALS
Qty: 180 TABLET | Refills: 3 | Status: SHIPPED | OUTPATIENT
Start: 2022-06-23 | End: 2024-01-12

## 2022-06-23 RX ORDER — LISINOPRIL 5 MG/1
5 TABLET ORAL DAILY
Qty: 90 TABLET | Refills: 3 | Status: SHIPPED | OUTPATIENT
Start: 2022-06-23 | End: 2022-09-21

## 2022-06-23 RX ORDER — HEPARIN SODIUM (PORCINE) LOCK FLUSH IV SOLN 100 UNIT/ML 100 UNIT/ML
5 SOLUTION INTRAVENOUS
Status: CANCELLED | OUTPATIENT
Start: 2022-06-23

## 2022-06-23 NOTE — PROGRESS NOTES
Assessment & Plan     Iron deficiency anemia  Fatigue  Had been taking iron supplements, but ran out several months ago. Had previously had iron infusions as well.  Presents today due to this fatigue which felt similar to previous time where she had low hemoglobin.  Patient does not report other symptoms concerning for other causes of fatigue such as changes to hair or skin, no constipation, no change in weight, no gross bleeding in the stool or urine, no abdominal pain.  Reports no chest pain or palpitations.  Had previously been prescribed iron supplements twice daily and tolerated that well. Hgb returned at 5.9. This critical lab was staffed with the attending physician who agreed to plan. Patient was called using a [employer] , though there was no answer and voice mail was not set up.   - ferrous sulfate (FEROSUL) 325 (65 Fe) MG tablet; Take 1 tablet (325 mg) by mouth 2 times daily (with meals)  Dispense: 180 tablet; Refill: 3  - CBC with Platelets and Reflex to Iron Studies  - Will attempt again today and tomorrow to inform patient of low hgb and treatment plan  - Patient to follow-up within the week to recheck hgb or sooner if having bleeds or worsening symptoms  - IV iron transfusions ordered  -Plan at next visit to decrease iron supplementation to every other day    Diabetes mellitus with proteinuria (H)  A1c 1 year prior was 11.  Patient has not been taking her diabetes medication metformin and dapagliflozin due to running out of these medications.  Patient reports no barriers to getting these are coming in but was feeling well and did not think to return to clinic.  Discussed importance of these medications and follow-up even when asymptomatic.  Patient reports increased thirst and urination.  Patient has stopped using her freestyle ady due to skin irritation and prefers spot checking glucose.  Pharm.D. also saw patient in the room.   -Refill dapagliflozin (FARXIGA) 5 MG TABS tablet; Take 1  "tablet (5 mg) by mouth daily  Dispense: 90 tablet; Refill: 3  -Refill lisinopril (ZESTRIL) 5 MG tablet; Take 1 tablet (5 mg) by mouth daily  Dispense: 90 tablet; Refill: 3  -Refill metFORMIN (GLUCOPHAGE XR) 500 MG 24 hr tablet; Take 1 tablet (500 mg) by mouth daily (with dinner) for 7 days, THEN 2 tablets (1,000 mg) daily (with dinner) for 7 days, THEN 3 tablets (1,500 mg) daily (with dinner) for 7 days, THEN 4 tablets (2,000 mg) daily (with dinner) for 99 days.  Dispense: 360 tablet; Refill: 3  - Hemoglobin A1c  - Lipid Panel  - Basic metabolic panel  - Albumin Random Urine Quantitative with Creat Ratio    Plan for upcoming visits:  -Return within the week for hgb recheck as well as for diabetes follow-up given this is poorly controlled  -Repeat hgb  -Diabetic foot exam  -Encourage seeing eye doctor  -Revisit that lifestyle interventions such as diet and exercise  -Decrease iron supplementation to every other day       BMI:   Estimated body mass index is 25.4 kg/m  as calculated from the following:    Height as of this encounter: 1.626 m (5' 4\").    Weight as of this encounter: 67.1 kg (148 lb).     Kavya Mckeon MD  [facility] JEAN Ball is a 41 year old with T2DM with proteinuria presenting for the following health issues:  Tabatha  was used in person    Diabetes (Follow-up dm, want to do lab work)  Diabetes:  Ran out of medications a few months ago. Hasn't been taking anything for it since. Feeling more thirst, peeing more often. No blurry vision. No peripheral numbness. Said she didn't have more refills and just put it off. No issues with pricing. No longer has the free style ady. Didn't like the skin irritation, would prefer to do finger pokes.  Patient reports that she has not come in to clinic for follow-up only due to feeling well overall and did not think to come in when asymptomatic, says she is not worried about her diabetes.    Anemia: Would like a recheck " "of hgb because she feels fatigued. No hair or skin changes, no constipation, no changes to weight.  No chest pain, no palpitations.        Review of Systems   As above      Objective    /84   Pulse 85   Temp 98.2  F (36.8  C) (Oral)   Resp 20   Ht 1.626 m (5' 4\")   Wt 67.1 kg (148 lb)   SpO2 100%   BMI 25.40 kg/m    Body mass index is 25.4 kg/m .  Physical Exam  Constitutional:       General: She is not in acute distress.     Appearance: Normal appearance. She is not diaphoretic.   Eyes:      Extraocular Movements: Extraocular movements intact.      Conjunctiva/sclera: Conjunctivae normal.   Cardiovascular:      Rate and Rhythm: Normal rate and regular rhythm.      Pulses: Normal pulses.      Heart sounds: Normal heart sounds.   Pulmonary:      Effort: Pulmonary effort is normal. No respiratory distress.      Breath sounds: Normal breath sounds.   Feet:      Right foot:      Skin integrity: Skin integrity normal.      Toenail Condition: Right toenails are normal.      Left foot:      Skin integrity: Skin integrity normal.      Toenail Condition: Left toenails are normal.      Comments: No numbness over feet, sensation symmetric bilaterally.  No microfilament was used due to lack of availability in room  Skin:     General: Skin is warm and dry.   Neurological:      Mental Status: She is alert.      Sensory: No sensory deficit.      Gait: Gait normal.   Psychiatric:         Mood and Affect: Mood normal.         Behavior: Behavior normal.            .  ..  "

## 2022-06-23 NOTE — PROGRESS NOTES
Medication Therapy Management (MTM) Encounter    ASSESSMENT:                            Medication Adherence/Access: Reestablishing access to medicines today. No previous barriers that aware of however somewhat lost to follow up as not seen in >1year, just needs some reeducation on dose timing of medicines.     Diabetes/Albuminuria: A1c improved from previous, still slightly above goal <7%. Prescribed appropriate therapies, ACE and SGLT2 for macroalbuminuria (>300 mg/g UACR). Despite improvement in A1c would continue agents to help promote weight loss (namely metformin) and for treatment of albuminuria (SGLT2 and ACE). Unclear what has caused improvement in A1c especially as SGLT2 last filled 5/2021, Metformin last filled 5/2021.     Anemia: Repeat Hgb obtained today however not available at time of visit. Previously experienced constipation however managed by stimulant/stool softener. Future consideration for her iron dosing, as current Rx reflects old iron prescribing habits. Per UptoDate: The paradigm for iron repletion has evolved, as evidence has begun to emerge suggesting that excessive dosing is potentially counterproductive, decreasing iron absorption and increasing side effects without improving iron levels or anemia. Suggestion to transition to every other day dosing or every Monday, Wednesday, Friday dosing for ease of remembering.     PLAN:                            Counseling provided on timing of doses of medicines, plan in discussion with patient, to fill pillbox:  - Morning: Ferrous sulfate, Farxiga, Senna-docusate  - Evening: Ferrous sulfate, Metformin, Lisinopril    Prescribed equipment for point-of-care blood glucose checks, as this writer was anticipating higher A1c than result received. Counseling provided on once per day checks, premeal, with goal blood glucose of <130.     Follow-up: Return in about 2 weeks (around 7/7/2022) for covisit with physician, Blood sugar (diabetes) follow up.      Medication issues to be addressed at a future visit       Change iron to every other day dosing (alt = MWF dosing)    Begin discussion of statin as nearing age 42 when will be indicated for primary prevention    Can consider decreasing frequency of self-monitor blood glucose checks to a few times per week    SUBJECTIVE/OBJECTIVE:                          Lizzy Wright is a 41 year old female coming in for an initial visit. She was referred to me from Dr. Mckeon. Today's visit is a co-visit with Dr. Mckeon.  (ID# [email or web address]) was used during today's visit.      Reason for visit: Diabetes follow up.    Allergies/ADRs: Reviewed in chart  Past Medical History: Reviewed in chart  Social History     Tobacco Use     Smoking status: Never Smoker     Smokeless tobacco: Never Used   Substance Use Topics     Alcohol use: No     Drug use: No     ^Reviewed today    Medication Adherence/Access: Historically when had access to medicines, reports used pillbox and took medicines morning and evening. No concerns for copayments, no concerns for access to refills/pharmacy.     Diabetes/Albuminuria: Dr. Mckeon reviewed restarting Metformin via titration. Historically pt took in the evening after dinner. Historically took Farxiga once daily and Lisinopril once daily, does not remember when she took these. Denies concerns for adverse drug reactions from medicines. Did not like CGM mainly because her workplace is hot and device became itchy. Would prefer to transition back to self-monitor blood glucose via point-of-care testing.     Lab Results   Component Value Date    A1C 7.2 06/23/2022    A1C 11.0 03/23/2021    A1C 5.9 05/14/2020    A1C 5.3 06/20/2018    A1C 6.5 05/19/2016    A1C 13.4 11/12/2014     Anemia: Is unsure, but believes took iron once per day. Did cause constipation in the past but medicine that was prescribed to help this was helpful. Requests refill of this.     Today's Vitals:   BP Readings from Last 1 Encounters:  "  06/23/22 123/84     Pulse Readings from Last 1 Encounters:   06/23/22 85     Wt Readings from Last 1 Encounters:   06/23/22 148 lb (67.1 kg)     Ht Readings from Last 1 Encounters:   06/23/22 5' 4\" (1.626 m)     Estimated body mass index is 25.4 kg/m  as calculated from the following:    Height as of an earlier encounter on 6/23/22: 5' 4\" (1.626 m).    Weight as of an earlier encounter on 6/23/22: 148 lb (67.1 kg).    Temp Readings from Last 1 Encounters:   06/23/22 98.2  F (36.8  C) (Oral)       ----------------      I spent 20 minutes with this patient today. Dr. Mckeon was provided the recommendations above  in clinic today and Dr. Valencia is the authorizing prescriber for this visit through the pharmacist collaborative practice agreement.. A copy of the visit note was provided to the patient's provider(s).    The patient was given a summary of these recommendations. See Provider note/AVS from today.     Eve Mcallister, Pharm.D., [facility]  [address], [place] [address]  Phone: [phone]  June 23, 2022 at 12:34 PM    For insurance/tracking purposes, [facility] Team Documentation:   Medication Therapy Recommendations  Diabetes mellitus with proteinuria (H)    Current Medication: dapagliflozin (FARXIGA) 5 MG TABS tablet   Rationale: Does not understand instructions - Adherence - Adherence   Recommendation: Provide Education   Status: Patient Agreed - Adherence/Education                "

## 2022-06-23 NOTE — PROGRESS NOTES
Preceptor Attestation:   Patient seen, evaluated and discussed with the resident. I have verified the content of the note, which accurately reflects my assessment of the patient and the plan of care.   Supervising Physician:  Edwar Valencia MD

## 2022-06-23 NOTE — LETTER
June 27, 2022      Lizzy Wright  [address] [place] [address]        Dear ,    Just want to inform you that your hemoglobin was very low.   If you experiencing diarrhea that is black or stool that is bright red, blood in your urine, any vomiting with blood, or any abdominal pain.  Please called us [phone] to reschedule  appointment for a repeat hemoglobin because this is concerningly low.      Thank you,    Santiago Hilario          Resulted Orders   Hemoglobin A1c   Result Value Ref Range    Hemoglobin A1C 7.2 (H) 0.0 - 5.6 %      Comment:      Normal <5.7%   Prediabetes 5.7-6.4%    Diabetes 6.5% or higher     Note: Adopted from ADA consensus guidelines.   Lipid Panel   Result Value Ref Range    Cholesterol 129 <=199 mg/dL    Triglycerides 194 (H) <=149 mg/dL    Direct Measure HDL 31 (L) >=50 mg/dL      Comment:      HDL Cholesterol Reference Range:     0-2 years:   No reference ranges established for patients under 2 years old  [facility] lipid analytes.    2-8 years:  Greater than 45 mg/dL     18 years and older:   Female: Greater than or equal to 50 mg/dL   Male:   Greater than or equal to 40 mg/dL    LDL Cholesterol Calculated 59 <=129 mg/dL    Patient Fasting > 8hrs? Unknown    Basic metabolic panel   Result Value Ref Range    Sodium 140 136 - 145 mmol/L    Potassium 3.8 3.5 - 5.0 mmol/L    Chloride 109 (H) 98 - 107 mmol/L    Carbon Dioxide (CO2) 24 22 - 31 mmol/L    Anion Gap 7 5 - 18 mmol/L    Urea Nitrogen 9 8 - 22 mg/dL    Creatinine 0.60 0.60 - 1.10 mg/dL    Calcium 8.9 8.5 - 10.5 mg/dL    Glucose 146 (H) 70 - 125 mg/dL    GFR Estimate >90 >60 mL/min/1.73m2      Comment:      Effective December 21, 2021 eGFRcr in adults is calculated using the 2021 CKD-EPI creatinine equation which includes age and gender (Nellie et al., NEJM, DOI: 10.1056/FDNAce6497231)   Albumin Random Urine Quantitative with Creat Ratio   Result Value Ref Range    Microalbumin Urine mg/dL 13.05 (H) 0.00  - 1.99 mg/dL    Creatinine Urine mg/dL 35 mg/dL    Microalbumin Urine mg/g Cr 372.9 (H) <=19.9 mg/g Cr    Narrative    Microalbumin, Random Urine   <2.0 mg/dL . . . . . . . . Normal   3.0-30.0 mg/dL . . . . . . Microalbuminuria   >30.0 mg/dL . . . . . .  . Clinical Proteinuria     Microalbumin/Creatinine Ratio, Random Urine   <20 mg/g . . . . .. . . . Normal    mg/g . . . . . . . Microalbuminuria   >300 mg/g . . . . . . . . Clinical Proteinuria   CBC with Platelets and Reflex to Iron Studies   Result Value Ref Range    WBC Count 5.8 4.0 - 11.0 10e3/uL    RBC Count 3.76 (L) 3.80 - 5.20 10e6/uL    Hemoglobin 5.9 (LL) 11.7 - 15.7 g/dL    Hematocrit 23.2 (L) 35.0 - 47.0 %    MCV 62 (L) 78 - 100 fL    MCH 15.7 (L) 26.5 - 33.0 pg    MCHC 25.4 (L) 31.5 - 36.5 g/dL    RDW 21.4 (H) 10.0 - 15.0 %    Platelet Count 299 150 - 450 10e3/uL   Extra Green Top (Lithium Heparin) Tube   Result Value Ref Range    Hold Specimen [facility]    Iron & Iron Binding Capacity   Result Value Ref Range    Iron 12 (L) 37 - 145 ug/dL    Iron Sat Index 3 (L) 15 - 46 %    Iron Binding Capacity 408 240 - 430 ug/dL   Ferritin   Result Value Ref Range    Ferritin 2 (L) 6 - 175 ng/mL       If you have any questions or concerns, please call the clinic at the number listed above.       Sincerely,      Kavya Mckeon MD

## 2022-06-23 NOTE — NURSING NOTE
name: Anju Reddy  Language: Tabatha  Agency: [employer]  Phone number: [phone]  Face to face spoken

## 2022-06-24 LAB
FERRITIN SERPL-MCNC: 2 NG/ML (ref 6–175)
IRON BINDING CAPACITY (ROCHE): 408 UG/DL (ref 240–430)
IRON SATN MFR SERPL: 3 % (ref 15–46)
IRON SERPL-MCNC: 12 UG/DL (ref 37–145)

## 2022-07-11 ENCOUNTER — TELEPHONE (OUTPATIENT)
Dept: FAMILY MEDICINE | Facility: CLINIC | Age: 42
End: 2022-07-11

## 2022-07-11 DIAGNOSIS — D50.8 OTHER IRON DEFICIENCY ANEMIA: Primary | ICD-10-CM

## 2022-07-11 NOTE — TELEPHONE ENCOUNTER
Called no answer, mail box is not set up cannot leave message.  Will try again late.    Santiago Hilario MA

## 2022-09-21 ENCOUNTER — HOSPITAL ENCOUNTER (INPATIENT)
Facility: HOSPITAL | Age: 42
LOS: 3 days | Discharge: HOME | End: 2022-09-24
Attending: EMERGENCY MEDICINE | Admitting: STUDENT IN AN ORGANIZED HEALTH CARE EDUCATION/TRAINING PROGRAM
Payer: COMMERCIAL

## 2022-09-21 ENCOUNTER — APPOINTMENT (OUTPATIENT)
Dept: CT IMAGING | Facility: HOSPITAL | Age: 42
End: 2022-09-21
Attending: FAMILY MEDICINE
Payer: COMMERCIAL

## 2022-09-21 DIAGNOSIS — N20.0 STAGHORN CALCULUS: ICD-10-CM

## 2022-09-21 DIAGNOSIS — A41.9 SEPSIS WITHOUT ACUTE ORGAN DYSFUNCTION, DUE TO UNSPECIFIED ORGANISM (H): ICD-10-CM

## 2022-09-21 DIAGNOSIS — N10 PYELONEPHRITIS, ACUTE: ICD-10-CM

## 2022-09-21 LAB
ALBUMIN SERPL BCG-MCNC: 4.4 G/DL (ref 3.5–5.2)
ALBUMIN UR-MCNC: 200 MG/DL
ALP SERPL-CCNC: 191 U/L (ref 35–104)
ALT SERPL W P-5'-P-CCNC: 71 U/L (ref 10–35)
ANION GAP SERPL CALCULATED.3IONS-SCNC: 16 MMOL/L (ref 7–15)
APPEARANCE UR: ABNORMAL
AST SERPL W P-5'-P-CCNC: 37 U/L (ref 10–35)
BACTERIA #/AREA URNS HPF: ABNORMAL /HPF
BASOPHILS # BLD AUTO: 0 10E3/UL (ref 0–0.2)
BASOPHILS NFR BLD AUTO: 0 %
BILIRUB SERPL-MCNC: 0.4 MG/DL
BILIRUB UR QL STRIP: NEGATIVE
BUN SERPL-MCNC: 7.7 MG/DL (ref 6–20)
CALCIUM SERPL-MCNC: 9.3 MG/DL (ref 8.6–10)
CHLORIDE SERPL-SCNC: 98 MMOL/L (ref 98–107)
COLOR UR AUTO: YELLOW
CREAT SERPL-MCNC: 0.55 MG/DL (ref 0.51–0.95)
DEPRECATED HCO3 PLAS-SCNC: 22 MMOL/L (ref 22–29)
EOSINOPHIL # BLD AUTO: 0 10E3/UL (ref 0–0.7)
EOSINOPHIL NFR BLD AUTO: 0 %
ERYTHROCYTE [DISTWIDTH] IN BLOOD BY AUTOMATED COUNT: 14 % (ref 10–15)
GFR SERPL CREATININE-BSD FRML MDRD: >90 ML/MIN/1.73M2
GLUCOSE SERPL-MCNC: 139 MG/DL (ref 70–99)
GLUCOSE UR STRIP-MCNC: NEGATIVE MG/DL
HCT VFR BLD AUTO: 36.4 % (ref 35–47)
HGB BLD-MCNC: 11.8 G/DL (ref 11.7–15.7)
HGB UR QL STRIP: ABNORMAL
HYALINE CASTS: 3 /LPF
IMM GRANULOCYTES # BLD: 0.1 10E3/UL
IMM GRANULOCYTES NFR BLD: 1 %
KETONES UR STRIP-MCNC: 40 MG/DL
LACTATE SERPL-SCNC: 1.1 MMOL/L (ref 0.7–2)
LEUKOCYTE ESTERASE UR QL STRIP: ABNORMAL
LYMPHOCYTES # BLD AUTO: 0.8 10E3/UL (ref 0.8–5.3)
LYMPHOCYTES NFR BLD AUTO: 5 %
MCH RBC QN AUTO: 26.6 PG (ref 26.5–33)
MCHC RBC AUTO-ENTMCNC: 32.4 G/DL (ref 31.5–36.5)
MCV RBC AUTO: 82 FL (ref 78–100)
MONOCYTES # BLD AUTO: 1 10E3/UL (ref 0–1.3)
MONOCYTES NFR BLD AUTO: 7 %
NEUTROPHILS # BLD AUTO: 12.2 10E3/UL (ref 1.6–8.3)
NEUTROPHILS NFR BLD AUTO: 87 %
NITRATE UR QL: POSITIVE
NRBC # BLD AUTO: 0 10E3/UL
NRBC BLD AUTO-RTO: 0 /100
PH UR STRIP: 7.5 [PH] (ref 5–7)
PLATELET # BLD AUTO: 252 10E3/UL (ref 150–450)
POTASSIUM SERPL-SCNC: 3.3 MMOL/L (ref 3.4–5.3)
PROT SERPL-MCNC: 8.5 G/DL (ref 6.4–8.3)
RBC # BLD AUTO: 4.44 10E6/UL (ref 3.8–5.2)
RBC URINE: 56 /HPF
SARS-COV-2 RNA RESP QL NAA+PROBE: NEGATIVE
SODIUM SERPL-SCNC: 136 MMOL/L (ref 136–145)
SP GR UR STRIP: 1.02 (ref 1–1.03)
SQUAMOUS EPITHELIAL: 10 /HPF
UROBILINOGEN UR STRIP-MCNC: <2 MG/DL
WBC # BLD AUTO: 14 10E3/UL (ref 4–11)
WBC URINE: >182 /HPF

## 2022-09-21 PROCEDURE — 258N000003 HC RX IP 258 OP 636: Performed by: EMERGENCY MEDICINE

## 2022-09-21 PROCEDURE — 96365 THER/PROPH/DIAG IV INF INIT: CPT

## 2022-09-21 PROCEDURE — 93005 ELECTROCARDIOGRAM TRACING: CPT | Performed by: EMERGENCY MEDICINE

## 2022-09-21 PROCEDURE — 87149 DNA/RNA DIRECT PROBE: CPT | Performed by: FAMILY MEDICINE

## 2022-09-21 PROCEDURE — 93005 ELECTROCARDIOGRAM TRACING: CPT | Performed by: STUDENT IN AN ORGANIZED HEALTH CARE EDUCATION/TRAINING PROGRAM

## 2022-09-21 PROCEDURE — 250N000011 HC RX IP 250 OP 636: Performed by: EMERGENCY MEDICINE

## 2022-09-21 PROCEDURE — U0003 INFECTIOUS AGENT DETECTION BY NUCLEIC ACID (DNA OR RNA); SEVERE ACUTE RESPIRATORY SYNDROME CORONAVIRUS 2 (SARS-COV-2) (CORONAVIRUS DISEASE [COVID-19]), AMPLIFIED PROBE TECHNIQUE, MAKING USE OF HIGH THROUGHPUT TECHNOLOGIES AS DESCRIBED BY CMS-2020-01-R: HCPCS | Performed by: EMERGENCY MEDICINE

## 2022-09-21 PROCEDURE — 96361 HYDRATE IV INFUSION ADD-ON: CPT

## 2022-09-21 PROCEDURE — 99285 EMERGENCY DEPT VISIT HI MDM: CPT | Mod: 25

## 2022-09-21 PROCEDURE — 96375 TX/PRO/DX INJ NEW DRUG ADDON: CPT

## 2022-09-21 PROCEDURE — 87086 URINE CULTURE/COLONY COUNT: CPT | Performed by: EMERGENCY MEDICINE

## 2022-09-21 PROCEDURE — 81003 URINALYSIS AUTO W/O SCOPE: CPT | Performed by: STUDENT IN AN ORGANIZED HEALTH CARE EDUCATION/TRAINING PROGRAM

## 2022-09-21 PROCEDURE — 258N000003 HC RX IP 258 OP 636: Performed by: FAMILY MEDICINE

## 2022-09-21 PROCEDURE — 250N000013 HC RX MED GY IP 250 OP 250 PS 637

## 2022-09-21 PROCEDURE — 80053 COMPREHEN METABOLIC PANEL: CPT | Performed by: EMERGENCY MEDICINE

## 2022-09-21 PROCEDURE — C9803 HOPD COVID-19 SPEC COLLECT: HCPCS

## 2022-09-21 PROCEDURE — 250N000013 HC RX MED GY IP 250 OP 250 PS 637: Performed by: EMERGENCY MEDICINE

## 2022-09-21 PROCEDURE — 120N000001 HC R&B MED SURG/OB

## 2022-09-21 PROCEDURE — 83036 HEMOGLOBIN GLYCOSYLATED A1C: CPT

## 2022-09-21 PROCEDURE — 85025 COMPLETE CBC W/AUTO DIFF WBC: CPT | Performed by: EMERGENCY MEDICINE

## 2022-09-21 PROCEDURE — 74176 CT ABD & PELVIS W/O CONTRAST: CPT

## 2022-09-21 PROCEDURE — 87077 CULTURE AEROBIC IDENTIFY: CPT | Performed by: FAMILY MEDICINE

## 2022-09-21 PROCEDURE — 83605 ASSAY OF LACTIC ACID: CPT | Performed by: EMERGENCY MEDICINE

## 2022-09-21 PROCEDURE — 81001 URINALYSIS AUTO W/SCOPE: CPT | Performed by: EMERGENCY MEDICINE

## 2022-09-21 PROCEDURE — 36415 COLL VENOUS BLD VENIPUNCTURE: CPT | Performed by: EMERGENCY MEDICINE

## 2022-09-21 RX ORDER — CEFTRIAXONE 1 G/1
1 INJECTION, POWDER, FOR SOLUTION INTRAMUSCULAR; INTRAVENOUS ONCE
Status: COMPLETED | OUTPATIENT
Start: 2022-09-21 | End: 2022-09-21

## 2022-09-21 RX ORDER — ACETAMINOPHEN 325 MG/1
650 TABLET ORAL EVERY 4 HOURS PRN
Status: DISCONTINUED | OUTPATIENT
Start: 2022-09-21 | End: 2022-09-24 | Stop reason: HOSPADM

## 2022-09-21 RX ORDER — ACETAMINOPHEN 325 MG/1
650 TABLET ORAL ONCE
Status: DISCONTINUED | OUTPATIENT
Start: 2022-09-21 | End: 2022-09-21

## 2022-09-21 RX ORDER — ACETAMINOPHEN 325 MG/1
650 TABLET ORAL ONCE
Status: COMPLETED | OUTPATIENT
Start: 2022-09-21 | End: 2022-09-21

## 2022-09-21 RX ORDER — KETOROLAC TROMETHAMINE 15 MG/ML
15 INJECTION, SOLUTION INTRAMUSCULAR; INTRAVENOUS ONCE
Status: COMPLETED | OUTPATIENT
Start: 2022-09-21 | End: 2022-09-21

## 2022-09-21 RX ADMIN — SODIUM CHLORIDE 1000 ML: 9 INJECTION, SOLUTION INTRAVENOUS at 15:08

## 2022-09-21 RX ADMIN — CEFTRIAXONE SODIUM 1 G: 1 INJECTION, POWDER, FOR SOLUTION INTRAMUSCULAR; INTRAVENOUS at 15:21

## 2022-09-21 RX ADMIN — SODIUM CHLORIDE 1000 ML: 9 INJECTION, SOLUTION INTRAVENOUS at 17:19

## 2022-09-21 RX ADMIN — ACETAMINOPHEN 650 MG: 325 TABLET, FILM COATED ORAL at 15:17

## 2022-09-21 RX ADMIN — KETOROLAC TROMETHAMINE 15 MG: 15 INJECTION, SOLUTION INTRAMUSCULAR; INTRAVENOUS at 15:21

## 2022-09-21 RX ADMIN — ACETAMINOPHEN 650 MG: 325 TABLET, FILM COATED ORAL at 21:32

## 2022-09-21 ASSESSMENT — ACTIVITIES OF DAILY LIVING (ADL)
ADLS_ACUITY_SCORE: 37

## 2022-09-21 ASSESSMENT — ENCOUNTER SYMPTOMS
CHILLS: 1
BACK PAIN: 1
FEVER: 1
HEMATURIA: 1

## 2022-09-21 NOTE — PHARMACY-ADMISSION MEDICATION HISTORY
Pharmacy Note - Admission Medication History    Pertinent Provider Information: patient does not know names of medications     ______________________________________________________________________    Prior To Admission (PTA) med list completed and updated in EMR.       PTA Med List   Medication Sig Last Dose     acetaminophen (TYLENOL) 325 MG tablet Take 1-2 tablets (325-650 mg) by mouth every 6 hours as needed for mild pain 9/20/2022 at Unknown time     dapagliflozin (FARXIGA) 5 MG TABS tablet Take 1 tablet (5 mg) by mouth daily Past Week at Unknown time     ferrous sulfate (FEROSUL) 325 (65 Fe) MG tablet Take 1 tablet (325 mg) by mouth 2 times daily (with meals) Past Week at Unknown time     metFORMIN (GLUCOPHAGE XR) 500 MG 24 hr tablet Take 1 tablet (500 mg) by mouth daily (with dinner) for 7 days, THEN 2 tablets (1,000 mg) daily (with dinner) for 7 days, THEN 3 tablets (1,500 mg) daily (with dinner) for 7 days, THEN 4 tablets (2,000 mg) daily (with dinner) for 99 days. (Patient taking differently: Take 2 tablets daily with dinner) Past Week at Unknown time     senna-docusate (SENOKOT-S/PERICOLACE) 8.6-50 MG tablet Take 1 tablet by mouth daily Past Week at Unknown time       Information source(s): Patient, Family member and [facility]  Method of interview communication: in-person    Summary of Changes to PTA Med List  New: none  Discontinued: lisinopril  Changed: metformin    Patient was asked about OTC/herbal products specifically.  PTA med list reflects this.    In the past week, patient estimated taking medication this percent of the time:  less than 50% due to illness.    Allergies were reviewed, assessed, and updated with the patient.      Patient does not use any multi-dose medications prior to admission.    The information provided in this note is only as accurate as the sources available at the time of the update(s).    Thank you for the opportunity to participate in the care of this  patient.    Marielos Mak, [facility]  9/21/2022 5:06 PM

## 2022-09-21 NOTE — ED TRIAGE NOTES
Patient arrives to triage from home with chief complaint of blood in urine, right sided back pain for the past two weeks.  Patient reports intermittent chest pain which started about two weeks ago as well.  Patient is alert and oriented x4.  Last dose of Tylenol around 0100.

## 2022-09-21 NOTE — PROGRESS NOTES
Remote Note    Presents o ED with 2-3 days of shaking chills at home. Now febrile.     Hemodynamically stable at this time.    Labs consistent with urosepsis. Cultures pending.    CT edematous right kidney with complete staghorn stone without any obvious collections amenable to drainage.    Hopefully can treat infection with IV antibiotics as complex pyelonephritis.

## 2022-09-21 NOTE — LETTER
September 24, 2022       TO: Lizzy Wright  [address] [place] [address]         To whom it may concern,    Lizzy Wright was hospitalized from 09/21/22-09/24/22 and cannot return to work until 10/03/22.      Sincerely,       Gabby Burns MD

## 2022-09-21 NOTE — ED PROVIDER NOTES
EMERGENCY DEPARTMENT ENCOUNTER      NAME: Lizzy Wright  AGE: 41 year old female  YOB: 1980  MRN: [id number]  EVALUATION DATE & TIME: 9/21/2022  2:14 PM    PCP: Sona Bran    ED PROVIDER: Paul Villeda M.D.      Chief Complaint   Patient presents with     Hematuria     Back Pain     Chest Pain         FINAL IMPRESSION:  1. Pyelonephritis, acute    2. Staghorn calculus    3. Sepsis without acute organ dysfunction, due to unspecified organism (H)          ED COURSE & MEDICAL DECISION MAKING:    Pertinent Labs & Imaging studies reviewed. (See chart for details)  ED Course as of 09/21/22 1946   Wed Sep 21, 2022   1502 Patient is a 41-year-old woman who presents with 2 weeks of hematuria, recent chills, flank pain.  She is febrile, tachycardic, blood pressure 111/77.  She is uncomfortable but not septic appearing.  Her initial urinalysis is nitrite positive with greater than 182 WBCs, highly suggestive of UTI.  I will order IV Rocephin.  Blood cultures, lactic acid obtained.  CT scan shows staghorn calculus.  I will reach out to urology to see if this would put her at increased risk for pyelonephritis.  She may benefit from observation if so.   1503 CT Abdomen Pelvis w/o Contrast  1.  Staghorn calculus occupying the right renal pelvis extending into both upper and lower pole calyces. Related right kidney enlargement and mild perinephric edema.  2.  No left renal calculi.  3.  Hepatic steatosis.   1527 WBC(!): 14.0   1527 Lactic Acid: 1.1   1558 KSI paged   2427 Resp: 22   1707 I updated the patient.  She is feeling somewhat better.  Mucous membranes remain dry.  Tachycardia has improved but is still above 100.  I ordered a second liter of IV fluids   1720 Resp: 22   After speaking with Dr. Del Valle, I will admit the patient.  He notes a significantly increased risk with pyelonephritis having a staghorn calculus in the renal pelvis.  There is no fluid collection that would be amenable to percutaneous  nephrostomy at this time, but she will need close monitoring tonight to make sure that the antibiotics are taking care of the infection.  Patient was updated and admitted to the resident service.      Additional ED Course Timestamps:  2:58 PM Met with and introduced myself to the patient. Disccused history and plan of care.  4:50 PM Spoke with Dr. Del Valle who recommended admission.  5:03 PM Updated patient on imaging results and plan for admission. She reports that she is feeling slightly better.  5:20 PM Spoke with Dr. Reese from [facility] regarding plan for patient admission.    At the conclusion of the encounter I discussed the results of all of the tests and the disposition. The questions were answered. The patient or family acknowledged understanding and was agreeable with the care plan.       60 minutes of critical care time for prompt antibiotics in the setting of pyonephritis and sepsis, consultation with urology.  Close monitoring of heart rate and blood pressure.    MEDICATIONS GIVEN IN THE EMERGENCY:  Medications   acetaminophen (TYLENOL) tablet 650 mg (650 mg Oral Given 9/21/22 1517)   0.9% sodium chloride BOLUS (0 mLs Intravenous Stopped 9/21/22 1917)   cefTRIAXone (ROCEPHIN) 1 g vial to attach to  mL bag for ADULTS or NS 50 mL bag for PEDS (0 g Intravenous Stopped 9/21/22 1545)   ketorolac (TORADOL) injection 15 mg (15 mg Intravenous Given 9/21/22 1521)   0.9% sodium chloride BOLUS (0 mLs Intravenous Stopped 9/21/22 1829)         NEW PRESCRIPTIONS STARTED AT TODAY'S ER VISIT  New Prescriptions    No medications on file          =================================================================    HPI    Patient information was obtained from: Patient    Use of : Yes (Phone) - Language Tabatha Wright is a 41 year old female with a pertinent history of T2DM, chronic kidney disease stage G1/A3, and anemia who presents to this ED for evaluation of back pain and chills.    The  patient reports that 2 weeks ago, she developed hematuria. This has since resolved, but states that shortly after she developed a fever, chills, lower back pain, chest pain, nausea, and one episode of vomiting. States that the back pain and chills are the worst. No chance of pregnancy. No other concerns reported at this time.       REVIEW OF SYSTEMS   Review of Systems   Constitutional: Positive for chills and fever.   Cardiovascular: Positive for chest pain.   Genitourinary: Positive for hematuria (resolved).   Musculoskeletal: Positive for back pain.   All other systems reviewed and are negative.      PAST MEDICAL HISTORY:  Past Medical History:   Diagnosis Date     Anemia      Diabetes (H)      Diabetes mellitus, type II (H)      H/O gestational diabetes mellitus, not currently pregnant 2013    Pt was started on insulin by HE DM educator on 10/3/13.  She is on NPH 12 units at bedtime and Humalog 1 unit per carb with meals.  She will NEED weekly BPP/NST until delivers at [facility]./[place]      H/O seasonal allergies      History of  2013    First c-sect was done in  in [facility] and so she has unknown scar on uterus.  She had repeat c-sect  by Dr Pineda (Jasmin OBNIGEL) at [facility] and 13 at [facility] (had tubal ligation)      History of insulin dependent diabetes mellitus 2018       PAST SURGICAL HISTORY:  Past Surgical History:   Procedure Laterality Date      SECTION  2008 and       SECTION       TUBAL LIGATION       TUBAL LIGATION             CURRENT MEDICATIONS:    No current facility-administered medications for this encounter.     Current Outpatient Medications   Medication     acetaminophen (TYLENOL) 325 MG tablet     dapagliflozin (FARXIGA) 5 MG TABS tablet     ferrous sulfate (FEROSUL) 325 (65 Fe) MG tablet     metFORMIN (GLUCOPHAGE XR) 500 MG 24 hr tablet     senna-docusate (SENOKOT-S/PERICOLACE) 8.6-50 MG tablet     blood glucose (NO BRAND  "SPECIFIED) test strip     blood glucose monitoring (NO BRAND SPECIFIED) meter device kit     thin (NO BRAND SPECIFIED) lancets       ALLERGIES:  Allergies   Allergen Reactions     Nkda [No Known Drug Allergies]        FAMILY HISTORY:  Family History   Problem Relation Age of Onset     Family History Negative Other      Diabetes No family hx of      Coronary Artery Disease No family hx of      Cancer No family hx of      Heart Disease No family hx of        SOCIAL HISTORY:   Social History     Socioeconomic History     Marital status:    Occupational History     Occupation: homemaker   Tobacco Use     Smoking status: Never Smoker     Smokeless tobacco: Never Used   Substance and Sexual Activity     Alcohol use: No     Drug use: No     Sexual activity: Yes     Partners: Male     Birth control/protection: Surgical       VITALS:  BP (!) 86/52   Pulse 94   Temp (!) 102.5  F (39.2  C) (Oral)   Resp (!) 36   Ht 1.575 m (5' 2\")   Wt 62.8 kg (138 lb 8 oz)   LMP 09/16/2022 (Exact Date)   SpO2 97%   BMI 25.33 kg/m      PHYSICAL EXAM    Constitutional: Well developed, well nourished. Uncomfortable appearing.  HENT: Normocephalic, atraumatic, mucous membranes moist, nose normal. Neck- Supple, gross ROM intact.   Eyes: Pupils mid-range, conjunctiva without injection, no discharge.   Respiratory: Clear to auscultation bilaterally, no respiratory distress, no wheezing, speaks full sentences easily. No cough.  Cardiovascular: No murmurs. Tachycardic.  GI: Soft, no tenderness to deep palpation in all quadrants, no masses.  Musculoskeletal: Moving all 4 extremities intentionally and without pain. No obvious deformity.  Skin: Warm, dry, no rash.  Neurologic: Alert & oriented x 3, cranial nerves grossly intact.  Psychiatric: Affect normal, cooperative.       LAB:  All pertinent labs reviewed and interpreted.  Labs Ordered and Resulted from Time of ED Arrival to Time of ED Departure   COMPREHENSIVE METABOLIC PANEL - " Abnormal       Result Value    Sodium 136      Potassium 3.3 (*)     Chloride 98      Carbon Dioxide (CO2) 22      Anion Gap 16 (*)     Urea Nitrogen 7.7      Creatinine 0.55      Calcium 9.3      Glucose 139 (*)     Alkaline Phosphatase 191 (*)     AST 37 (*)     ALT 71 (*)     Protein Total 8.5 (*)     Albumin 4.4      Bilirubin Total 0.4      GFR Estimate >90     ROUTINE UA WITH MICROSCOPIC REFLEX TO CULTURE - Abnormal    Color Urine Yellow      Appearance Urine Turbid (*)     Glucose Urine Negative      Bilirubin Urine Negative      Ketones Urine 40  (*)     Specific Gravity Urine 1.016      Blood Urine >1.0 mg/dL (*)     pH Urine 7.5 (*)     Protein Albumin Urine 200  (*)     Urobilinogen Urine <2.0      Nitrite Urine Positive (*)     Leukocyte Esterase Urine 500 Jessie/uL (*)     Bacteria Urine Many (*)     RBC Urine 56 (*)     WBC Urine >182 (*)     Squamous Epithelials Urine 10 (*)     Hyaline Casts Urine 3 (*)    CBC WITH PLATELETS AND DIFFERENTIAL - Abnormal    WBC Count 14.0 (*)     RBC Count 4.44      Hemoglobin 11.8      Hematocrit 36.4      MCV 82      MCH 26.6      MCHC 32.4      RDW 14.0      Platelet Count 252      % Neutrophils 87      % Lymphocytes 5      % Monocytes 7      % Eosinophils 0      % Basophils 0      % Immature Granulocytes 1      NRBCs per 100 WBC 0      Absolute Neutrophils 12.2 (*)     Absolute Lymphocytes 0.8      Absolute Monocytes 1.0      Absolute Eosinophils 0.0      Absolute Basophils 0.0      Absolute Immature Granulocytes 0.1      Absolute NRBCs 0.0     LACTIC ACID WHOLE BLOOD - Normal    Lactic Acid 1.1     COVID-19 VIRUS (CORONAVIRUS) BY PCR   BLOOD CULTURE   BLOOD CULTURE   URINE CULTURE       RADIOLOGY:  Reviewed all pertinent imaging. Please see official radiology report.  CT Abdomen Pelvis w/o Contrast   Final Result   IMPRESSION:       1.  Staghorn calculus occupying the right renal pelvis extending into both upper and lower pole calyces. Related right kidney  enlargement and mild perinephric edema.   2.  No left renal calculi.   3.  Hepatic steatosis.             EKG:    All EKG interpretations will be found in ED course above.    PROCEDURES:   None      I, Virgen Chris am serving as a scribe to document services personally performed by Dr. Paul Villeda based on my observation and the provider's statements to me. I, Paul Villeda MD attest that Virgen Perez is acting in a scribe capacity, has observed my performance of the services and has documented them in accordance with my direction.    Paul Villeda M.D.  Emergency Medicine  [facility] EMERGENCY DEPARTMENT  [facility] [facility] [id number] [phone]  Dept: [phone]     Paul Villead MD  09/21/22 1949

## 2022-09-22 LAB
ACINETOBACTER SPECIES: NOT DETECTED
ALBUMIN SERPL BCG-MCNC: 3.5 G/DL (ref 3.5–5.2)
ALP SERPL-CCNC: 151 U/L (ref 35–104)
ALT SERPL W P-5'-P-CCNC: 47 U/L (ref 10–35)
ANION GAP SERPL CALCULATED.3IONS-SCNC: 8 MMOL/L (ref 7–15)
AST SERPL W P-5'-P-CCNC: 25 U/L (ref 10–35)
ATRIAL RATE - MUSE: 127 BPM
BILIRUB SERPL-MCNC: 0.2 MG/DL
BUN SERPL-MCNC: 5.4 MG/DL (ref 6–20)
CALCIUM SERPL-MCNC: 8.3 MG/DL (ref 8.6–10)
CHLORIDE SERPL-SCNC: 106 MMOL/L (ref 98–107)
CITROBACTER SPECIES: NOT DETECTED
CREAT SERPL-MCNC: 0.57 MG/DL (ref 0.51–0.95)
CTX-M: NOT DETECTED
DEPRECATED HCO3 PLAS-SCNC: 25 MMOL/L (ref 22–29)
DIASTOLIC BLOOD PRESSURE - MUSE: NORMAL MMHG
ENTEROBACTER SPECIES: NOT DETECTED
ERYTHROCYTE [DISTWIDTH] IN BLOOD BY AUTOMATED COUNT: 14.3 % (ref 10–15)
ESCHERICHIA COLI: DETECTED
GFR SERPL CREATININE-BSD FRML MDRD: >90 ML/MIN/1.73M2
GLUCOSE BLDC GLUCOMTR-MCNC: 101 MG/DL (ref 70–99)
GLUCOSE BLDC GLUCOMTR-MCNC: 113 MG/DL (ref 70–99)
GLUCOSE BLDC GLUCOMTR-MCNC: 113 MG/DL (ref 70–99)
GLUCOSE BLDC GLUCOMTR-MCNC: 148 MG/DL (ref 70–99)
GLUCOSE BLDC GLUCOMTR-MCNC: 150 MG/DL (ref 70–99)
GLUCOSE SERPL-MCNC: 120 MG/DL (ref 70–99)
HBA1C MFR BLD: 8.1 %
HCT VFR BLD AUTO: 30 % (ref 35–47)
HGB BLD-MCNC: 9.6 G/DL (ref 11.7–15.7)
IMP: NOT DETECTED
INTERPRETATION ECG - MUSE: NORMAL
KLEBSIELLA OXYTOCA: NOT DETECTED
KLEBSIELLA PNEUMONIAE: NOT DETECTED
KPC: NOT DETECTED
MCH RBC QN AUTO: 26.7 PG (ref 26.5–33)
MCHC RBC AUTO-ENTMCNC: 32 G/DL (ref 31.5–36.5)
MCV RBC AUTO: 83 FL (ref 78–100)
NDM: NOT DETECTED
OXA (DETECTED/NOT DETECTED): NOT DETECTED
P AXIS - MUSE: 50 DEGREES
PLATELET # BLD AUTO: 190 10E3/UL (ref 150–450)
POTASSIUM SERPL-SCNC: 3.1 MMOL/L (ref 3.4–5.3)
POTASSIUM SERPL-SCNC: 3.3 MMOL/L (ref 3.4–5.3)
POTASSIUM SERPL-SCNC: 3.6 MMOL/L (ref 3.4–5.3)
PR INTERVAL - MUSE: 148 MS
PROT SERPL-MCNC: 7 G/DL (ref 6.4–8.3)
PROTEUS SPECIES: NOT DETECTED
PSEUDOMONAS AERUGINOSA: NOT DETECTED
QRS DURATION - MUSE: 82 MS
QT - MUSE: 292 MS
QTC - MUSE: 424 MS
R AXIS - MUSE: 19 DEGREES
RBC # BLD AUTO: 3.6 10E6/UL (ref 3.8–5.2)
SODIUM SERPL-SCNC: 139 MMOL/L (ref 136–145)
SYSTOLIC BLOOD PRESSURE - MUSE: NORMAL MMHG
T AXIS - MUSE: 4 DEGREES
VENTRICULAR RATE- MUSE: 127 BPM
VIM: NOT DETECTED
WBC # BLD AUTO: 7 10E3/UL (ref 4–11)

## 2022-09-22 PROCEDURE — 96361 HYDRATE IV INFUSION ADD-ON: CPT

## 2022-09-22 PROCEDURE — 250N000012 HC RX MED GY IP 250 OP 636 PS 637

## 2022-09-22 PROCEDURE — 36415 COLL VENOUS BLD VENIPUNCTURE: CPT

## 2022-09-22 PROCEDURE — 96366 THER/PROPH/DIAG IV INF ADDON: CPT

## 2022-09-22 PROCEDURE — 99222 1ST HOSP IP/OBS MODERATE 55: CPT | Mod: AI

## 2022-09-22 PROCEDURE — 84132 ASSAY OF SERUM POTASSIUM: CPT

## 2022-09-22 PROCEDURE — 250N000011 HC RX IP 250 OP 636

## 2022-09-22 PROCEDURE — 120N000001 HC R&B MED SURG/OB

## 2022-09-22 PROCEDURE — 258N000003 HC RX IP 258 OP 636

## 2022-09-22 PROCEDURE — 85027 COMPLETE CBC AUTOMATED: CPT

## 2022-09-22 PROCEDURE — 250N000013 HC RX MED GY IP 250 OP 250 PS 637

## 2022-09-22 PROCEDURE — 99221 1ST HOSP IP/OBS SF/LOW 40: CPT | Performed by: UROLOGY

## 2022-09-22 RX ORDER — KETOROLAC TROMETHAMINE 15 MG/ML
15 INJECTION, SOLUTION INTRAMUSCULAR; INTRAVENOUS EVERY 6 HOURS PRN
Status: DISCONTINUED | OUTPATIENT
Start: 2022-09-22 | End: 2022-09-24 | Stop reason: HOSPADM

## 2022-09-22 RX ORDER — DEXTROSE MONOHYDRATE 25 G/50ML
25-50 INJECTION, SOLUTION INTRAVENOUS
Status: DISCONTINUED | OUTPATIENT
Start: 2022-09-22 | End: 2022-09-24 | Stop reason: HOSPADM

## 2022-09-22 RX ORDER — PROCHLORPERAZINE MALEATE 10 MG
10 TABLET ORAL EVERY 6 HOURS PRN
Status: DISCONTINUED | OUTPATIENT
Start: 2022-09-22 | End: 2022-09-24 | Stop reason: HOSPADM

## 2022-09-22 RX ORDER — PROCHLORPERAZINE 25 MG
25 SUPPOSITORY, RECTAL RECTAL EVERY 12 HOURS PRN
Status: DISCONTINUED | OUTPATIENT
Start: 2022-09-22 | End: 2022-09-24 | Stop reason: HOSPADM

## 2022-09-22 RX ORDER — SODIUM CHLORIDE 9 MG/ML
INJECTION, SOLUTION INTRAVENOUS CONTINUOUS
Status: DISCONTINUED | OUTPATIENT
Start: 2022-09-22 | End: 2022-09-23

## 2022-09-22 RX ORDER — ONDANSETRON 4 MG/1
4 TABLET, ORALLY DISINTEGRATING ORAL EVERY 6 HOURS PRN
Status: DISCONTINUED | OUTPATIENT
Start: 2022-09-22 | End: 2022-09-24 | Stop reason: HOSPADM

## 2022-09-22 RX ORDER — POTASSIUM CHLORIDE 1500 MG/1
40 TABLET, EXTENDED RELEASE ORAL ONCE
Status: COMPLETED | OUTPATIENT
Start: 2022-09-22 | End: 2022-09-22

## 2022-09-22 RX ORDER — ONDANSETRON 2 MG/ML
4 INJECTION INTRAMUSCULAR; INTRAVENOUS EVERY 6 HOURS PRN
Status: DISCONTINUED | OUTPATIENT
Start: 2022-09-22 | End: 2022-09-24 | Stop reason: HOSPADM

## 2022-09-22 RX ORDER — CEFTRIAXONE 1 G/1
1 INJECTION, POWDER, FOR SOLUTION INTRAMUSCULAR; INTRAVENOUS EVERY 24 HOURS
Status: DISCONTINUED | OUTPATIENT
Start: 2022-09-22 | End: 2022-09-24 | Stop reason: HOSPADM

## 2022-09-22 RX ORDER — LIDOCAINE 40 MG/G
CREAM TOPICAL
Status: DISCONTINUED | OUTPATIENT
Start: 2022-09-22 | End: 2022-09-24 | Stop reason: HOSPADM

## 2022-09-22 RX ORDER — NICOTINE POLACRILEX 4 MG
15-30 LOZENGE BUCCAL
Status: DISCONTINUED | OUTPATIENT
Start: 2022-09-22 | End: 2022-09-24 | Stop reason: HOSPADM

## 2022-09-22 RX ADMIN — SODIUM CHLORIDE: 9 INJECTION, SOLUTION INTRAVENOUS at 22:11

## 2022-09-22 RX ADMIN — ACETAMINOPHEN 650 MG: 325 TABLET, FILM COATED ORAL at 09:36

## 2022-09-22 RX ADMIN — CEFTRIAXONE SODIUM 1 G: 1 INJECTION, POWDER, FOR SOLUTION INTRAMUSCULAR; INTRAVENOUS at 13:31

## 2022-09-22 RX ADMIN — KETOROLAC TROMETHAMINE 15 MG: 15 INJECTION, SOLUTION INTRAMUSCULAR; INTRAVENOUS at 18:33

## 2022-09-22 RX ADMIN — POTASSIUM CHLORIDE 40 MEQ: 1500 TABLET, EXTENDED RELEASE ORAL at 06:41

## 2022-09-22 RX ADMIN — INSULIN ASPART 1 UNITS: 100 INJECTION, SOLUTION INTRAVENOUS; SUBCUTANEOUS at 18:46

## 2022-09-22 RX ADMIN — SODIUM CHLORIDE: 9 INJECTION, SOLUTION INTRAVENOUS at 13:21

## 2022-09-22 RX ADMIN — ACETAMINOPHEN 650 MG: 325 TABLET, FILM COATED ORAL at 03:01

## 2022-09-22 RX ADMIN — ACETAMINOPHEN 650 MG: 325 TABLET, FILM COATED ORAL at 15:45

## 2022-09-22 RX ADMIN — SODIUM CHLORIDE: 9 INJECTION, SOLUTION INTRAVENOUS at 03:04

## 2022-09-22 ASSESSMENT — ACTIVITIES OF DAILY LIVING (ADL)
ADLS_ACUITY_SCORE: 37

## 2022-09-22 NOTE — PLAN OF CARE
Intermittently tachycardic with fever up to 103.1, PRN Tylenol given, recheck 101.9, ice packs and ice water provided.  Voiding appropriately, independent to bathroom.    0600  Dr. Stoddard notified of positive blood cultures, gram negative bacilli.  No new orders, ABX coverage adequate.        Goal Outcome Evaluation:  Problem: Plan of Care - These are the overarching goals to be used throughout the patient stay.    Goal: Optimal Comfort and Wellbeing  Outcome: Ongoing, Progressing  Intervention: Monitor Pain and Promote Comfort  Recent Flowsheet Documentation  Taken 9/22/2022 0031 by Marissa Bull, RN  Pain Management Interventions: declines     Problem: Infection  Goal: Absence of Infection Signs and Symptoms  Outcome: Ongoing, Progressing

## 2022-09-22 NOTE — CONSULTS
Assessment/Plan:    Assessment & Plan     Pyelonephritis and complete staghorn right kidney -> IV antibiotics as no obvious opportunities for drainage. Keep in hospital til full culture results available.      No name on file.  [facility] EMERGENCY DEPARTMENT    Subjective:     HPI  Ms. Lizzy Wright is a 41 year old [place] female who seen in consultation by [facility] for urosepsis and renal stone.    She is a first time  stone former who has not required stone clearance procedures.     She presented to ED after feeling poorly at home with right flank pain, fever and chills for about 1 week. Denies prior stones. Reports she is feeling much better since admission. Denies flank pain at this time. History is limited by language barrier.     CT scan is personally reviewed and demonstrates a complete right staghorn stone. Kidney is quite edematous consistent with pyelonephritis but there is no appreciable hydronephrosis.    Significant labs from presentation include positive blood cultures..    Will have to rely on IV antibiotics and hopefully she will settle. Will require culture specific antibiotic continuously until percutaneous stone clearance can be performed in 2-3 weeks.    ROS   Review of systems not obtained due to inability to communicate with the patient.     Past Medical History:   Diagnosis Date     Anemia      Diabetes (H)      Diabetes mellitus, type II (H)      H/O gestational diabetes mellitus, not currently pregnant 2013    Pt was started on insulin by [facility] DM educator on 10/3/13.  She is on NPH 12 units at bedtime and Humalog 1 unit per carb with meals.  She will NEED weekly BPP/NST until delivers at [facility]./[place]      H/O seasonal allergies      History of  2013    First c-sect was done in  in [place] and so she has unknown scar on uterus.  She had repeat c-sect  by Dr Pineda (Jasmin LIPSCOMB) at [facility] and 13 at [facility]  (had tubal ligation)      History of insulin dependent diabetes mellitus 2018       Past Surgical History:   Procedure Laterality Date      SECTION  2008 and       SECTION       TUBAL LIGATION       TUBAL LIGATION         Current Outpatient Medications   Medication Sig Dispense Refill     acetaminophen (TYLENOL) 325 MG tablet Take 1-2 tablets (325-650 mg) by mouth every 6 hours as needed for mild pain 100 tablet 1     dapagliflozin (FARXIGA) 5 MG TABS tablet Take 1 tablet (5 mg) by mouth daily 90 tablet 3     ferrous sulfate (FEROSUL) 325 (65 Fe) MG tablet Take 1 tablet (325 mg) by mouth 2 times daily (with meals) 180 tablet 3     metFORMIN (GLUCOPHAGE XR) 500 MG 24 hr tablet Take 1 tablet (500 mg) by mouth daily (with dinner) for 7 days, THEN 2 tablets (1,000 mg) daily (with dinner) for 7 days, THEN 3 tablets (1,500 mg) daily (with dinner) for 7 days, THEN 4 tablets (2,000 mg) daily (with dinner) for 99 days. (Patient taking differently: Take 2 tablets daily with dinner) 360 tablet 3     senna-docusate (SENOKOT-S/PERICOLACE) 8.6-50 MG tablet Take 1 tablet by mouth daily 90 tablet 3     blood glucose (NO BRAND SPECIFIED) test strip Use to test blood sugar 1 times daily or as directed. To accompany: Blood Glucose Monitor Brands: per insurance. 100 strip 3     blood glucose monitoring (NO BRAND SPECIFIED) meter device kit Use to test blood sugar 1 times daily or as directed. Preferred blood glucose meter OR supplies to accompany: Blood Glucose Monitor Brands: per insurance. 1 kit 0     thin (NO BRAND SPECIFIED) lancets Use with lanceting device. Use to test 1 times per day. To accompany: Blood Glucose Monitor Brands: per insurance. 100 each 3       Allergies   Allergen Reactions     Nkda [No Known Drug Allergies]        Social History     Socioeconomic History     Marital status:      Spouse name: Not on file     Number of children: Not on file     Years of education: Not on  file     Highest education level: Not on file   Occupational History     Occupation: homemaker   Tobacco Use     Smoking status: Never Smoker     Smokeless tobacco: Never Used   Substance and Sexual Activity     Alcohol use: No     Drug use: No     Sexual activity: Yes     Partners: Male     Birth control/protection: Surgical   Other Topics Concern     Not on file   Social History Narrative     Not on file     Social Determinants of Health     Financial Resource Strain: Not on file   Food Insecurity: Not on file   Transportation Needs: Not on file   Physical Activity: Not on file   Stress: Not on file   Social Connections: Not on file   Intimate Partner Violence: Not on file   Housing Stability: Not on file       Family History   Problem Relation Age of Onset     Family History Negative Other      Diabetes No family hx of      Coronary Artery Disease No family hx of      Cancer No family hx of      Heart Disease No family hx of        Objective:     No flank tenderness       LABS  Most Recent 3 CBC's:Recent Labs   Lab Test 09/22/22  0711 09/21/22  1444 06/23/22  0950   WBC 7.0 14.0* 5.8   HGB 9.6* 11.8 5.9*   MCV 83 82 62*    252 299     Most Recent 3 BMP's:Recent Labs   Lab Test 09/22/22  1117 09/22/22  0826 09/22/22  0711 09/22/22  0535 09/22/22  0445 09/21/22  1444 06/23/22  0950 05/15/20  1144   NA  --   --  139  --   --  136 140  --    POTASSIUM 3.6  --  3.3* 3.1*  --  3.3* 3.8   < >   CHLORIDE  --   --  106  --   --  98 109*  --    CO2  --   --  25  --   --  22 24  --    BUN  --   --  5.4*  --   --  7.7 9  --    CR  --   --  0.57  --   --  0.55 0.60  --    ANIONGAP  --   --  8  --   --  16* 7  --    SABINE  --   --  8.3*  --   --  9.3 8.9  --    GLC  --  101* 120*  --  148* 139* 146*  --     < > = values in this interval not displayed.     7-Day Micro Results     Collected Updated Procedure Result Status      09/21/2022 1848 09/21/2022 2000 Asymptomatic COVID-19 Virus (Coronavirus) by PCR Nasopharyngeal  [[id number]]    Swab from Nasopharyngeal    Final result Component Value   SARS CoV2 PCR Negative   NEGATIVE: SARS-CoV-2 (COVID-19) RNA not detected, presumed negative.            09/21/2022 1448 09/22/2022 0551 Blood Culture Peripheral Blood [[id number]]   (Abnormal)   Peripheral Blood    Preliminary result Component Value   Culture Positive on the 1st day of incubation  [P]     Gram negative bacilli  [P]     1 of 2 bottles               09/21/2022 1448 09/22/2022 0758 Verigene GN Panel [[id number]]    (Abnormal)   Peripheral Blood    Final result Component Value   Acinetobacter species Not Detected   Citrobacter species Not Detected   Enterobacter species Not Detected   Proteus species Not Detected   Escherichia coli Detected   Positive for Escherichia coli by [employer] multiplex nucleic acid test. Final identification and antimicrobial susceptibility testing will be verified by standard methods. Verigene test will not distinguish E. coli from Shigella species including Shigella dysenteriae, Shigella flexneri, Shigella boydii, and Shigella sonnei. Specimens containing Shigella species or E. coli will be reported as positive for E. coli.   Klebsiella pneumoniae Not Detected   Klebsiella oxytoca Not Detected   Pseudomonas aeruginosa Not Detected   CTX-M Not Detected   KPC Not Detected   NDM Not Detected   VIM Not Detected   IMP Not Detected   OXA Not Detected            09/21/2022 1444 09/22/2022 0625 Blood Culture Peripheral Blood [[id number]]   (Abnormal)   Peripheral Blood    Preliminary result Component Value   Culture Positive on the 1st day of incubation  [P]     Gram negative bacilli  [P]     1 of 2 bottles               09/21/2022 1345 09/22/2022 1132 Urine Culture [[id number]]   Urine, Midstream    Preliminary result Component Value   Culture Culture in progress  [P]                    Most Recent Urinalysis:Recent Labs   Lab Test 09/21/22  1345   COLOR Yellow   APPEARANCE Turbid*   URINEGLC  Negative   URINEBILI Negative   URINEKETONE 40 *   SG 1.016   UBLD >1.0 mg/dL*   URINEPH 7.5*   PROTEIN 200 *   NITRITE Positive*   LEUKEST 500 Jessie/uL*   RBCU 56*   WBCU >182*     Most Recent ESR & CRP:No lab results found.

## 2022-09-22 NOTE — PROGRESS NOTES
Chart reviewed, Tabatha speaking, family supportive, dtr helps interpret, no svcs and no CM needs.

## 2022-09-22 NOTE — PROGRESS NOTES
Received pt from ED nurse at 2205. Tabatha speaking lady. Daughter at bedside and helped with interpreting. Pt alert and oriented times 4. C/o headache and back pain, which she said is better after she got tylenol around 2130. Pt had received tylenol for increased temperature of 103. Recheck temp at 2230 was 102.3. Sinus tach for tele rhythm. Pt voided and she had no blood in urine per pt.    no

## 2022-09-22 NOTE — ED PROVIDER NOTES
Notified by lab that blood cultures positive for E. coli.  Admitting team notified.    MD Jacinto Bartlett Zabrina N, MD  09/22/22 08

## 2022-09-22 NOTE — PROGRESS NOTES
[facility]    Progress Note - Hospitalist Service       Date of Admission:  9/21/2022    Assessment & Plan          Lizzy Wright is a 41 year old female admitted on 9/21/2022. She is a history of type 2 diabetes who presents with 2 weeks of back pain and chills found to have right-sided staghorn calculi with associated pyelonephritis.     Pyelonephritis  Right-sided staghorn calculi  Patient with 2 weeks of back pain chills and fever found here to have a complicated UTI in setting of staghorn calculi and right renal pelvis.  Patient is febrile tachycardic and initially hypotensive which improved nicely with 2 L of fluid given in ED.  ED discussed case with [facility] of urology and given location of stone likely no possible intervention at this time.  Recommended admission for antibiotics.  White count of 14 with a UA consistent with infection.  CT scan showing the right renal pelvis calculi as well as perinephric edema.  Lactic acid normal. Blood cultures with E. Coli.  -Continue IV Rocephin  -Urology consult  -Maintenance IV fluids  -E. Coli sensitivities pending      History of type 2 diabetes  A1c 8.1 upon admission, recently was 7.2. She is on metformin and dapagliflozin at home.  Sugar on admission was 139.  Given A1c and admission sugar of less than 139 we will start with sliding scale.  Not on insulin at home.  -Medium dose correction scale     Mild transaminitis, improving  Likely in setting of hypotension on admission.  Hypotension corrected nicely with fluids.  - Recheck CMP in 1 to 2 days to ensure resolution     Hypokalemia, resolved  Patient with admission potassium of 3.3.  Likely in setting of poor p.o. intake from being sick.  - Potassium protocol     Diet: Moderate Consistent Carb (60 g CHO per Meal) Diet    DVT Prophylaxis: Pneumatic Compression Devices  Bond Catheter: Not present  Fluids: 100 ml/hr  Central Lines: None  Cardiac Monitoring: None  Code Status: Full Code   "    Disposition Plan      Expected Discharge Date: 09/23/2022                The patient's care was discussed with Dr. Linton.    Leslie Jaramillo MD  [facility], PGY-2  Pager #: [phone] or contact via [employer] cindy.    Clinically Significant Risk Factors Present on Admission                    # DMII: A1C = 8.1 % (Ref range: <5.7 %) within past 3 months  # Overweight: Estimated body mass index is 25.33 kg/m  as calculated from the following:    Height as of this encounter: 1.575 m (5' 2\").    Weight as of this encounter: 62.8 kg (138 lb 8 oz).        ______________________________________________________________________    Interval History   Admitted overnight with pyelonephritis.  She states she is still feeling generally unwell, has a headache, back pain.  Did not get much sleep.    Data reviewed today: I reviewed all medications, new labs and imaging results over the last 24 hours.     Physical Exam   Vital Signs: Temp: 99.4  F (37.4  C) Temp src: Oral BP: 104/80 Pulse: 100   Resp: 18 SpO2: 99 % O2 Device: None (Room air)    Weight: 138 lbs 8 oz  GEN: Patient sitting comfortably in no acute distress.  HEEN: Head is atraumatic, normocephalic, eyes anicteric, mucous membranes moist.  CV: Regular rate and rhythm without obvious murmurs.  PULM: Clear to auscultation bilaterally without wheezing or rales.  NEURO: Alert and oriented x3.  No focal motor abnormalities.  Face symmetric.  PSYCH: Appropriate affect.  SKIN: No rashes, bruising, or other lesions.    Data   Recent Labs   Lab 09/22/22  1334 09/22/22  1117 09/22/22  0826 09/22/22  0711 09/22/22  0535 09/22/22  0445 09/21/22  1444   WBC  --   --   --  7.0  --   --  14.0*   HGB  --   --   --  9.6*  --   --  11.8   MCV  --   --   --  83  --   --  82   PLT  --   --   --  190  --   --  252   NA  --   --   --  139  --   --  136   POTASSIUM  --  3.6  --  3.3* 3.1*  --  3.3*   CHLORIDE  --   --   --  106  --   --  98   CO2  --   --   " --  25  --   --  22   BUN  --   --   --  5.4*  --   --  7.7   CR  --   --   --  0.57  --   --  0.55   ANIONGAP  --   --   --  8  --   --  16*   SABINE  --   --   --  8.3*  --   --  9.3   *  --  101* 120*  --    < > 139*   ALBUMIN  --   --   --  3.5  --   --  4.4   PROTTOTAL  --   --   --  7.0  --   --  8.5*   BILITOTAL  --   --   --  0.2  --   --  0.4   ALKPHOS  --   --   --  151*  --   --  191*   ALT  --   --   --  47*  --   --  71*   AST  --   --   --  25  --   --  37*    < > = values in this interval not displayed.     Recent Results (from the past 24 hour(s))   CT Abdomen Pelvis w/o Contrast    Narrative    EXAM: CT ABDOMEN PELVIS W/O CONTRAST  LOCATION: [facility]  DATE/TIME: 9/21/2022 2:13 PM    INDICATION: hematuria, flank pain, fever  COMPARISON: None.  TECHNIQUE: CT scan of the abdomen and pelvis was performed without IV contrast. Multiplanar reformats were obtained. Dose reduction techniques were used.  CONTRAST: None.    FINDINGS:   LOWER CHEST: Normal.    HEPATOBILIARY: Diffuse hepatic steatosis. No significant mass. No bile duct dilatation. No calcified gallstones.    PANCREAS: Normal.    SPLEEN: Normal.    ADRENAL GLANDS: Normal.    KIDNEYS/BLADDER: The right kidney is enlarged. Filling of the right renal pelvis and both upper and lower pole calyces with contiguous calcified material consistent with a large staghorn calculus. There is mild edema around the lower pole of the right   kidney and pararenal fascia. The left kidney is normal in size. No left nephrolithiasis. No hydroureter. Urinary bladder is decompressed.    BOWEL: Normal with no obstruction or acute inflammatory change. Nothing for appendicitis.    LYMPH NODES: Normal.    VASCULATURE: Unremarkable.    PELVIC ORGANS: The uterus is normal in size. Normal adnexa.    MUSCULOSKELETAL: Minimal lumbar degenerative osteophytes. No spondylolisthesis.      Impression    IMPRESSION:     1.  Staghorn calculus occupying the  right renal pelvis extending into both upper and lower pole calyces. Related right kidney enlargement and mild perinephric edema.  2.  No left renal calculi.  3.  Hepatic steatosis.       Medications     sodium chloride 100 mL/hr at 09/22/22 1321       cefTRIAXone  1 g Intravenous Q24H     insulin aspart  1-7 Units Subcutaneous TID AC     insulin aspart  1-5 Units Subcutaneous At Bedtime     sodium chloride (PF)  3 mL Intracatheter Q8H

## 2022-09-22 NOTE — H&P
[facility]    History and Physical - Hospitalist Service       Date of Admission:  9/21/2022    Assessment & Plan      Lizzy Wright is a 41 year old female admitted on 9/21/2022. She is a history of type 2 diabetes who presents with 2 weeks of back pain and chills found to have right-sided staghorn calculi with associated pyelonephritis.    Pyelonephritis  Right-sided staghorn calculi  Patient with 2 weeks of back pain chills and fever found here to have a complicated UTI in setting of staghorn calculi and right renal pelvis.  Patient is febrile tachycardic and initially hypotensive which improved nicely with 2 L of fluid given in ED.  ED discussed case with [facility] of urology and given location of stone likely no possible intervention at this time.  Recommended admission for antibiotics.  On admission patient denying any further back pain and feeling better since presenting.  White count of 14 with a UA consistent with infection.  CT scan showing the right renal pelvis calculi as well as perinephric edema.  Lactic acid normal. Blood cultures with GNR.   -Continue IV Rocephin  -Urology consult  -Maintenance IV fluids  -Blood cultures speciation pending     History of type 2 diabetes  Patient with recent A1c of 7.2.  She is on metformin and dapagliflozin at home.  Sugar on admission was 139.  Given A1c and admission sugar of less than 139 we will start with sliding scale.  Not on insulin at home.  -Medium dose correction scale  -Repeat A1c    Transaminitis  Patient with mildly elevated ALT of 71 and AST of 37.  Likely in setting of hypotension on admission.  Hypotension corrected nicely with fluids.  -Recheck CMP in a.m.    Hypokalemia  Patient with admission potassium of 3.3.  Likely in setting of poor p.o. intake from being sick.  -Potassium protocol     Diet: Moderate Consistent Carb (60 g CHO per Meal) Diet    DVT Prophylaxis: Pneumatic Compression Devices  Bond Catheter: Not present  Central  "Lines: None  Cardiac Monitoring: None  Code Status: Full Code      Clinically Significant Risk Factors Present on Admission                    # DMII: A1C = 8.1 % (Ref range: <5.7 %) within past 3 months  # Overweight: Estimated body mass index is 25.33 kg/m  as calculated from the following:    Height as of this encounter: 1.575 m (5' 2\").    Weight as of this encounter: 62.8 kg (138 lb 8 oz).        Disposition Plan      Expected Discharge Date: 09/23/2022                The patient's care was discussed with the Team at morning report.    Mary Reese MD  Hospitalist Service  [facility]  Securely message with the Vocera Web Console (learn more here)  Text page via [employer] Paging/Directory         ______________________________________________________________________    Chief Complaint   Low back pain    History is obtained from the patient via use of an     History of Present Illness   Lizzy Wright is a 41 year old female who has a history of diabetes and presents with 2 weeks of chest pain, back pain,, chills, subjective fevers and overall not feeling well.  She states that for the last 2 weeks she has had this worsening pain and feeling sick.  She has never felt this way in the past nor has she ever had a UTI.  Patient denies history of kidney stones.  She denies any shortness of breath or abdominal pain.    Review of Systems    The 10 point Review of Systems is negative other than noted in the HPI or here.     Past Medical History    I have reviewed this patient's medical history and updated it with pertinent information if needed.   Past Medical History:   Diagnosis Date     Anemia      Diabetes (H)      Diabetes mellitus, type II (H)      H/O gestational diabetes mellitus, not currently pregnant 9/5/2013    Pt was started on insulin by HE DM educator on 10/3/13.  She is on NPH 12 units at bedtime and Humalog 1 unit per carb with meals.  She will NEED weekly BPP/NST until " delivers at [facility]./MELISSA      H/O seasonal allergies      History of  2013    First c-sect was done in  in [facility] and so she has unknown scar on uterus.  She had repeat c-sect  by Dr Pineda (Jasmin LIPSCOMB) at [facility] and 13 at [facility] (had tubal ligation)      History of insulin dependent diabetes mellitus 2018       Past Surgical History   I have reviewed this patient's surgical history and updated it with pertinent information if needed.  Past Surgical History:   Procedure Laterality Date      SECTION  2008 and       SECTION       TUBAL LIGATION       TUBAL LIGATION         Social History   I have reviewed this patient's social history and updated it with pertinent information if needed.  Social History     Tobacco Use     Smoking status: Never Smoker     Smokeless tobacco: Never Used   Substance Use Topics     Alcohol use: No     Drug use: No       Family History   I have reviewed this patient's family history and updated it with pertinent information if needed.  Family History   Problem Relation Age of Onset     Family History Negative Other      Diabetes No family hx of      Coronary Artery Disease No family hx of      Cancer No family hx of      Heart Disease No family hx of        Prior to Admission Medications   Prior to Admission Medications   Prescriptions Last Dose Informant Patient Reported? Taking?   acetaminophen (TYLENOL) 325 MG tablet 2022 at Unknown time  No Yes   Sig: Take 1-2 tablets (325-650 mg) by mouth every 6 hours as needed for mild pain   blood glucose (NO BRAND SPECIFIED) test strip   No No   Sig: Use to test blood sugar 1 times daily or as directed. To accompany: Blood Glucose Monitor Brands: per insurance.   blood glucose monitoring (NO BRAND SPECIFIED) meter device kit   No No   Sig: Use to test blood sugar 1 times daily or as directed. Preferred blood glucose meter OR supplies to accompany: Blood Glucose Monitor  Brands: per insurance.   dapagliflozin (FARXIGA) 5 MG TABS tablet Past Week at Unknown time  No Yes   Sig: Take 1 tablet (5 mg) by mouth daily   ferrous sulfate (FEROSUL) 325 (65 Fe) MG tablet Past Week at Unknown time  No Yes   Sig: Take 1 tablet (325 mg) by mouth 2 times daily (with meals)   metFORMIN (GLUCOPHAGE XR) 500 MG 24 hr tablet Past Week at Unknown time  No Yes   Sig: Take 1 tablet (500 mg) by mouth daily (with dinner) for 7 days, THEN 2 tablets (1,000 mg) daily (with dinner) for 7 days, THEN 3 tablets (1,500 mg) daily (with dinner) for 7 days, THEN 4 tablets (2,000 mg) daily (with dinner) for 99 days.   Patient taking differently: Take 2 tablets daily with dinner   senna-docusate (SENOKOT-S/PERICOLACE) 8.6-50 MG tablet Past Week at Unknown time  No Yes   Sig: Take 1 tablet by mouth daily   thin (NO BRAND SPECIFIED) lancets   No No   Sig: Use with lanceting device. Use to test 1 times per day. To accompany: Blood Glucose Monitor Brands: per insurance.      Facility-Administered Medications: None     Allergies   Allergies   Allergen Reactions     Nkda [No Known Drug Allergies]        Physical Exam   Vital Signs: Temp: 99.4  F (37.4  C) Temp src: Oral BP: 100/64 Pulse: 86   Resp: 27 SpO2: 96 % O2 Device: None (Room air)    Weight: 138 lbs 8 oz    Constitutional: fatigued appearing, awake, alert, cooperative, no apparent distress, non toxic appearing, resting comfortably in bed  Eyes: Lids and lashes normal, pupils equal, round and reactive to light, extra ocular muscles intact, sclera clear, conjunctiva normal  ENT: Normocephalic, without obvious abnormality, atraumatic, external ears without lesions, oral pharynx with moist mucous membranes, tonsils without erythema or exudates, gums normal and good dentition.  Hematologic / Lymphatic: no cervical lymphadenopathy  Respiratory: No increased work of breathing, good air exchange, clear to auscultation bilaterally, no crackles or wheezing  Cardiovascular:   regular rate and rhythm, normal S1 and S2, no S3 or S4, and no murmur noted  GI: No scars, normal bowel sounds, soft, non-distended, non-tender, no masses palpated  Skin: no rashes, erythema or lesions  Musculoskeletal: Mild right sided CVA tenderness. There is no redness, warmth, or swelling of the joints.  Full range of motion noted.  Tone is normal.  Neurologic: Awake, alert, oriented to name, place and time.  Cranial nerves II-XII are grossly intact.   Neuropsychiatric: General: normal, calm and normal eye contact  Level of consciousness: alert / normal  Affect: normal and pleasant       Data   Data reviewed today: I reviewed all medications, new labs and imaging results over the last 24 hours. I personally reviewed the EKG tracing showing Sinus tachycardia .    Recent Labs   Lab 09/22/22  0535 09/22/22  0445 09/21/22  1444   WBC  --   --  14.0*   HGB  --   --  11.8   MCV  --   --  82   PLT  --   --  252   NA  --   --  136   POTASSIUM 3.1*  --  3.3*   CHLORIDE  --   --  98   CO2  --   --  22   BUN  --   --  7.7   CR  --   --  0.55   ANIONGAP  --   --  16*   SABINE  --   --  9.3   GLC  --  148* 139*   ALBUMIN  --   --  4.4   PROTTOTAL  --   --  8.5*   BILITOTAL  --   --  0.4   ALKPHOS  --   --  191*   ALT  --   --  71*   AST  --   --  37*     UA: positive nitrites, blood, leuk esterase, WBC, RBC and bateria    Recent Results (from the past 24 hour(s))   CT Abdomen Pelvis w/o Contrast    Narrative    EXAM: CT ABDOMEN PELVIS W/O CONTRAST  LOCATION: [facility]  DATE/TIME: 9/21/2022 2:13 PM    INDICATION: hematuria, flank pain, fever  COMPARISON: None.  TECHNIQUE: CT scan of the abdomen and pelvis was performed without IV contrast. Multiplanar reformats were obtained. Dose reduction techniques were used.  CONTRAST: None.    FINDINGS:   LOWER CHEST: Normal.    HEPATOBILIARY: Diffuse hepatic steatosis. No significant mass. No bile duct dilatation. No calcified gallstones.    PANCREAS:  Normal.    SPLEEN: Normal.    ADRENAL GLANDS: Normal.    KIDNEYS/BLADDER: The right kidney is enlarged. Filling of the right renal pelvis and both upper and lower pole calyces with contiguous calcified material consistent with a large staghorn calculus. There is mild edema around the lower pole of the right   kidney and pararenal fascia. The left kidney is normal in size. No left nephrolithiasis. No hydroureter. Urinary bladder is decompressed.    BOWEL: Normal with no obstruction or acute inflammatory change. Nothing for appendicitis.    LYMPH NODES: Normal.    VASCULATURE: Unremarkable.    PELVIC ORGANS: The uterus is normal in size. Normal adnexa.    MUSCULOSKELETAL: Minimal lumbar degenerative osteophytes. No spondylolisthesis.      Impression    IMPRESSION:     1.  Staghorn calculus occupying the right renal pelvis extending into both upper and lower pole calyces. Related right kidney enlargement and mild perinephric edema.  2.  No left renal calculi.  3.  Hepatic steatosis.

## 2022-09-23 LAB
ALBUMIN SERPL BCG-MCNC: 3.7 G/DL (ref 3.5–5.2)
ALP SERPL-CCNC: 166 U/L (ref 35–104)
ALT SERPL W P-5'-P-CCNC: 46 U/L (ref 10–35)
ANION GAP SERPL CALCULATED.3IONS-SCNC: 10 MMOL/L (ref 7–15)
AST SERPL W P-5'-P-CCNC: 35 U/L (ref 10–35)
BACTERIA UR CULT: NORMAL
BILIRUB SERPL-MCNC: <0.2 MG/DL
BUN SERPL-MCNC: 5.4 MG/DL (ref 6–20)
CALCIUM SERPL-MCNC: 8.7 MG/DL (ref 8.6–10)
CHLORIDE SERPL-SCNC: 108 MMOL/L (ref 98–107)
CREAT SERPL-MCNC: 0.46 MG/DL (ref 0.51–0.95)
DEPRECATED HCO3 PLAS-SCNC: 22 MMOL/L (ref 22–29)
ERYTHROCYTE [DISTWIDTH] IN BLOOD BY AUTOMATED COUNT: 14.2 % (ref 10–15)
GFR SERPL CREATININE-BSD FRML MDRD: >90 ML/MIN/1.73M2
GLUCOSE BLDC GLUCOMTR-MCNC: 125 MG/DL (ref 70–99)
GLUCOSE BLDC GLUCOMTR-MCNC: 135 MG/DL (ref 70–99)
GLUCOSE BLDC GLUCOMTR-MCNC: 144 MG/DL (ref 70–99)
GLUCOSE BLDC GLUCOMTR-MCNC: 95 MG/DL (ref 70–99)
GLUCOSE SERPL-MCNC: 120 MG/DL (ref 70–99)
HCT VFR BLD AUTO: 29.5 % (ref 35–47)
HGB BLD-MCNC: 9.4 G/DL (ref 11.7–15.7)
LACTATE SERPL-SCNC: 0.7 MMOL/L (ref 0.7–2)
MCH RBC QN AUTO: 26.6 PG (ref 26.5–33)
MCHC RBC AUTO-ENTMCNC: 31.9 G/DL (ref 31.5–36.5)
MCV RBC AUTO: 84 FL (ref 78–100)
PLATELET # BLD AUTO: 199 10E3/UL (ref 150–450)
POTASSIUM SERPL-SCNC: 3.4 MMOL/L (ref 3.4–5.3)
POTASSIUM SERPL-SCNC: 4.1 MMOL/L (ref 3.4–5.3)
PROT SERPL-MCNC: 7.3 G/DL (ref 6.4–8.3)
RBC # BLD AUTO: 3.53 10E6/UL (ref 3.8–5.2)
SODIUM SERPL-SCNC: 140 MMOL/L (ref 136–145)
WBC # BLD AUTO: 5.7 10E3/UL (ref 4–11)

## 2022-09-23 PROCEDURE — 85014 HEMATOCRIT: CPT

## 2022-09-23 PROCEDURE — 250N000013 HC RX MED GY IP 250 OP 250 PS 637

## 2022-09-23 PROCEDURE — 120N000001 HC R&B MED SURG/OB

## 2022-09-23 PROCEDURE — 36415 COLL VENOUS BLD VENIPUNCTURE: CPT | Performed by: STUDENT IN AN ORGANIZED HEALTH CARE EDUCATION/TRAINING PROGRAM

## 2022-09-23 PROCEDURE — 84132 ASSAY OF SERUM POTASSIUM: CPT | Performed by: FAMILY MEDICINE

## 2022-09-23 PROCEDURE — 258N000003 HC RX IP 258 OP 636

## 2022-09-23 PROCEDURE — 99253 IP/OBS CNSLTJ NEW/EST LOW 45: CPT | Performed by: STUDENT IN AN ORGANIZED HEALTH CARE EDUCATION/TRAINING PROGRAM

## 2022-09-23 PROCEDURE — 80053 COMPREHEN METABOLIC PANEL: CPT

## 2022-09-23 PROCEDURE — 99232 SBSQ HOSP IP/OBS MODERATE 35: CPT | Mod: GC

## 2022-09-23 PROCEDURE — 36415 COLL VENOUS BLD VENIPUNCTURE: CPT

## 2022-09-23 PROCEDURE — 83605 ASSAY OF LACTIC ACID: CPT | Performed by: STUDENT IN AN ORGANIZED HEALTH CARE EDUCATION/TRAINING PROGRAM

## 2022-09-23 PROCEDURE — 250N000013 HC RX MED GY IP 250 OP 250 PS 637: Performed by: FAMILY MEDICINE

## 2022-09-23 PROCEDURE — 250N000011 HC RX IP 250 OP 636

## 2022-09-23 PROCEDURE — 82040 ASSAY OF SERUM ALBUMIN: CPT

## 2022-09-23 PROCEDURE — 36415 COLL VENOUS BLD VENIPUNCTURE: CPT | Performed by: FAMILY MEDICINE

## 2022-09-23 RX ORDER — POTASSIUM CHLORIDE 1500 MG/1
40 TABLET, EXTENDED RELEASE ORAL ONCE
Status: COMPLETED | OUTPATIENT
Start: 2022-09-23 | End: 2022-09-23

## 2022-09-23 RX ADMIN — SODIUM CHLORIDE: 9 INJECTION, SOLUTION INTRAVENOUS at 08:14

## 2022-09-23 RX ADMIN — CEFTRIAXONE SODIUM 1 G: 1 INJECTION, POWDER, FOR SOLUTION INTRAMUSCULAR; INTRAVENOUS at 13:30

## 2022-09-23 RX ADMIN — ACETAMINOPHEN 650 MG: 325 TABLET, FILM COATED ORAL at 23:31

## 2022-09-23 RX ADMIN — ACETAMINOPHEN 650 MG: 325 TABLET, FILM COATED ORAL at 01:56

## 2022-09-23 RX ADMIN — INSULIN ASPART 1 UNITS: 100 INJECTION, SOLUTION INTRAVENOUS; SUBCUTANEOUS at 18:58

## 2022-09-23 RX ADMIN — POTASSIUM CHLORIDE 40 MEQ: 1500 TABLET, EXTENDED RELEASE ORAL at 16:13

## 2022-09-23 ASSESSMENT — ACTIVITIES OF DAILY LIVING (ADL)
WALKING_OR_CLIMBING_STAIRS_DIFFICULTY: NO
ADLS_ACUITY_SCORE: 20
ADLS_ACUITY_SCORE: 20
DRESSING/BATHING_DIFFICULTY: NO
ADLS_ACUITY_SCORE: 20
DOING_ERRANDS_INDEPENDENTLY_DIFFICULTY: NO
ADLS_ACUITY_SCORE: 20
WEAR_GLASSES_OR_BLIND: NO
ADLS_ACUITY_SCORE: 20
DIFFICULTY_EATING/SWALLOWING: NO
FALL_HISTORY_WITHIN_LAST_SIX_MONTHS: NO
ADLS_ACUITY_SCORE: 20
ADLS_ACUITY_SCORE: 20
TOILETING_ISSUES: NO
ADLS_ACUITY_SCORE: 20
CHANGE_IN_FUNCTIONAL_STATUS_SINCE_ONSET_OF_CURRENT_ILLNESS/INJURY: NO
ADLS_ACUITY_SCORE: 20
ADLS_ACUITY_SCORE: 37
ADLS_ACUITY_SCORE: 20
CONCENTRATING,_REMEMBERING_OR_MAKING_DECISIONS_DIFFICULTY: NO
ADLS_ACUITY_SCORE: 20

## 2022-09-23 NOTE — PLAN OF CARE
Problem: Infection  Goal: Absence of Infection Signs and Symptoms  Outcome: Ongoing, Not Progressing   Goal Outcome Evaluation:     Patient experienced recurrent fever of 102.0. Admin Tylenol 650, effective for relief and back down to 97.9 PO. Triggered sepsis protocol, lactic acid 0.7. C/O headache relieved by ice pack and Tylenol. Slept some between cares but appears extremely tired. Tele reading NSR. No new concerns. Vocera appropriate for Tabatha interpretation. Understands minimal English, enough for basic needs.     Riky Major RN

## 2022-09-23 NOTE — PROGRESS NOTES
Much improved today.     Lesser amplitude temperature spikes consistent with pyelonephritis.    Culture - multi sensitive E coli.     Agree with ID for D/C on cefuroxime continued through perioperative period. Given severity of pyelonephritis, probably best to wait at least three weeks til stone clearance.

## 2022-09-23 NOTE — PLAN OF CARE
Problem: Diabetes Comorbidity  Goal: Blood Glucose Level Within Targeted Range  Outcome: Ongoing, Progressing  BG 95, 125.     Problem: Plan of Care - These are the overarching goals to be used throughout the patient stay.    Goal: Absence of Hospital-Acquired Illness or Injury  Intervention: Identify and Manage Fall Risk  Recent Flowsheet Documentation  Taken 9/23/2022 0815 by Yesica Holliday RN  Safety Promotion/Fall Prevention:    activity supervised    assistive device/personal items within reach    clutter free environment maintained    nonskid shoes/slippers when out of bed    patient and family education    safety round/check completed  Pt had no safety incidents this shift.   Pt is independent to the bathroom.    Pt is A/O. Voiding ok; Pt is independent.  Pt denied pain during this shift.   Telemetry: NSR.  Pt family visited.     Goal Outcome Evaluation:

## 2022-09-23 NOTE — PLAN OF CARE
Goal Outcome Evaluation:        Problem: Plan of Care - These are the overarching goals to be used throughout the patient stay.    Goal: Optimal Comfort and Wellbeing  Outcome: Ongoing, Progressing  Intervention: Monitor Pain and Promote Comfort  Recent Flowsheet Documentation  Taken 9/22/2022 1833 by Sadi Denney, RN  Pain Management Interventions: medication (see MAR)  Taken 9/22/2022 1545 by Sadi Denney, RN  Pain Management Interventions: medication (see MAR)     Problem: Diabetes Comorbidity  Goal: Blood Glucose Level Within Targeted Range  Outcome: Ongoing, Progressing     Pt AxO x4, VSS RR elevated, /113, pain 6/10 lower back/headache PRN tylenol x1/toradal given x1, independent in room, K+ 3.6, poor appetite tonight, Tabatha speaking interpretor used, IV infusing  ml/hr, pt sleeping in bed.

## 2022-09-23 NOTE — PROGRESS NOTES
[facility]    Progress Note - Hospitalist Service       Date of Admission:  9/21/2022    Assessment & Plan   Lizzy Wright is a 41 year old female admitted on 9/21/2022. She is a history of type 2 diabetes who presents with 2 weeks of back pain and chills found to have right-sided staghorn calculi with associated pyelonephritis, urology following, currently being treated with IV rocephin.      Pyelonephritis  Right-sided staghorn calculi  Patient with 2 weeks of back pain chills and fever found here to have a complicated UTI in setting of staghorn calculi and right renal pelvis.  Patient is febrile tachycardic and initially hypotensive which improved nicely with 2 L of fluid given in ED. CT scan showing the right renal pelvis calculi as well as perinephric edema. ED discussed case with [facility] of urology and given location of stone likely no possible intervention at this time. This AM, patient afebrile and reports improved sx, WBC continues to downtrend and wnl. Blood cultures with E. Coli.   -Continue IV Rocephin during hospitalization  - ID consult, recs appreciated   - At discharge will change to cefuroxime 500mg PO BID until 48h after stone removal   -Urology following, recs appreciated   - Continue abx per above as no obvious opportunities for drainage at this time   - Hospitalization until all cultures resolve  -Discontinue mIVF given improved PO intake   -E. Coli sensitivities pending   -Urine cx pending      History of type 2 diabetes  A1c 8.1 upon admission, recently was 7.2. She is on metformin and dapagliflozin at home.  Sugar on admission was 139.  Given A1c and admission sugar of less than 139 we will start with sliding scale.  Not on insulin at home.  -Medium dose correction scale     Mild transaminitis, improving  Diffuse hepatic steatosis on CT abdomen  Remains stable today, though still mildly elevated. Initially thought to be related to HoTN on admission, but BP has resolved and  "stabilized w/fluids. Likely 2/2 ongoing infection vs diffuse hepatic steatosis noted on CT AP. Considered hepatitis picture, but LFTs not characteristic of acute viral etiology. Also received HepB vaccine series, 2013 test aligns with this. No labs for HepC.  - Continue to monitor via CMP     Hypokalemia, resolved  Patient with admission potassium of 3.3.  Likely in setting of poor p.o. intake from being sick.  - Potassium protocol     Diet: Moderate Consistent Carb (60 g CHO per Meal) Diet    DVT Prophylaxis: Pneumatic Compression Devices  Bond Catheter: Not present  Fluids: 100 ml/hr  Central Lines: None  Cardiac Monitoring: None  Code Status: Full Code      Disposition Plan     Expected Discharge Date: 09/23/2022                The patient's care was discussed with Dr. Khan.    Sydney (Honoree) ALEXIS Herrera    I was present with the medical student who participated in the service and in the documentation of this note. I have verified the history and personally performed the physical exam and medical decision making, and have verified the content of the note, which accurately reflects my assessment of the patient and the plan of care.     Leslie Jaramillo MD  [facility], PGY-2  Pager #: [phone] or contact via [employer] cindy.    Clinically Significant Risk Factors Present on Admission                # DMII: A1C = 8.1 % (Ref range: <5.7 %) within past 3 months  # Overweight: Estimated body mass index is 25.35 kg/m  as calculated from the following:    Height as of this encounter: 1.575 m (5' 2\").    Weight as of this encounter: 62.9 kg (138 lb 9.6 oz).        ______________________________________________________________________    Interval History   Patient reports feeling better today. Denies fevers/chills, nausea/vomiting, SOB, and chest pain. Minimal improvement in back pain. Wondering when she will be able to go home.     Data reviewed today: I reviewed all medications, " new labs and imaging results over the last 24 hours.     Physical Exam   Vital Signs: Temp: 97.8  F (36.6  C) Temp src: Oral BP: 113/79 Pulse: 77   Resp: 24 SpO2: 98 % O2 Device: None (Room air)    Weight: 138 lbs 9.6 oz  GEN: Patient sitting comfortably in no acute distress.  HEEN: Head is atraumatic, normocephalic, eyes anicteric, mucous membranes moist.  CV: Regular rate and rhythm without obvious murmurs.  PULM: Clear to auscultation bilaterally without wheezing or rales.  NEURO: Alert and oriented x3.  No focal motor abnormalities.  Face symmetric.  PSYCH: Appropriate affect.  SKIN: No rashes, bruising, or other lesions.    Data   Recent Labs   Lab 09/23/22  0821 09/23/22  0553 09/22/22  2208 09/22/22  1334 09/22/22  1117 09/22/22  0826 09/22/22  0711 09/22/22  0445 09/21/22  1444   WBC  --  5.7  --   --   --   --  7.0  --  14.0*   HGB  --  9.4*  --   --   --   --  9.6*  --  11.8   MCV  --  84  --   --   --   --  83  --  82   PLT  --  199  --   --   --   --  190  --  252   NA  --  140  --   --   --   --  139  --  136   POTASSIUM  --  3.4  --   --  3.6  --  3.3*   < > 3.3*   CHLORIDE  --  108*  --   --   --   --  106  --  98   CO2  --  22  --   --   --   --  25  --  22   BUN  --  5.4*  --   --   --   --  5.4*  --  7.7   CR  --  0.46*  --   --   --   --  0.57  --  0.55   ANIONGAP  --  10  --   --   --   --  8  --  16*   SABINE  --  8.7  --   --   --   --  8.3*  --  9.3   GLC 95 120* 113*   < >  --    < > 120*   < > 139*   ALBUMIN  --  3.7  --   --   --   --  3.5  --  4.4   PROTTOTAL  --  7.3  --   --   --   --  7.0  --  8.5*   BILITOTAL  --  <0.2  --   --   --   --  0.2  --  0.4   ALKPHOS  --  166*  --   --   --   --  151*  --  191*   ALT  --  46*  --   --   --   --  47*  --  71*   AST  --  35  --   --   --   --  25  --  37*    < > = values in this interval not displayed.     No results found for this or any previous visit (from the past 24 hour(s)).  Medications     sodium chloride 100 mL/hr at 09/23/22 0814        cefTRIAXone  1 g Intravenous Q24H     insulin aspart  1-7 Units Subcutaneous TID AC     insulin aspart  1-5 Units Subcutaneous At Bedtime     sodium chloride (PF)  3 mL Intracatheter Q8H

## 2022-09-23 NOTE — CONSULTS
Consultation - INFECTIOUS DISEASE CONSULTATION  Lizzy Wright,  1980, MRN [id number]      Pyelonephritis, acute [N10]  Staghorn calculus [N20.0]  Sepsis without acute organ dysfunction, due to unspecified organism (H) [A41.9]    PCP: Sona Bran, [phone]   Code status:  Full Code               Chief Complaint:  Staghorn calculus with E. coli bacteremia.     Assessment:  Lizzy Wright is a 41 year old old female with   Poorly controlled type 2 diabetes mellitus with last A1c of 8.1  Staghorn renal calculus  E. coli bacteremia.  Positive blood culture on .  On IV ceftriaxone.  Fever improving.      Recommendations:   Continue IV ceftriaxone inpatient until afebrile.  When ready to discharge, changed to p.o. cefuroxime 500 mg twice daily until 48 hours after stone removal.    Discussed with the patient, nursing staff.    Thank you for letting us be part of the patient care team. We will follow.    Nivia Leo MD,MD  [facility].   [facility]  Office Telephone [phone].  Fax [phone]  [facility] paging    HPI:    Lizzy Wright is a 41 year old old female. History is provided by the patient, chart review.    Patient has a history of type 2 diabetes mellitus with poor control.  She was admitted with fever and chills and found to have staghorn right-sided renal calculus believed secondary E. coli bacteremia.  Has been on IV ceftriaxone.  Temperature improved.  Leukocytosis resolved.  Overall feeling better.  Urology planning outpatient stone treatment.  Seen today, the patient denies any pain.  No side effect from antibiotics.        ===========================================    Medical History  Past Medical History:   Diagnosis Date     Anemia      Diabetes (H)      Diabetes mellitus, type II (H)      H/O gestational diabetes mellitus, not currently pregnant 2013    Pt was started on insulin by [facility] DM educator on 10/3/13.  She is on NPH 12  units at bedtime and Humalog 1 unit per carb with meals.  She will NEED weekly BPP/NST until delivers at [facility]./[place]      H/O seasonal allergies      History of  2013    First c-sect was done in  in [facility] and so she has unknown scar on uterus.  She had repeat c-sect  by Dr Pineda (Jasmin LIPSCOMB) at [facility] and 13 at [facility] (had tubal ligation)      History of insulin dependent diabetes mellitus 2018        Surgical History  Past Surgical History:   Procedure Laterality Date      SECTION  2008 and       SECTION       TUBAL LIGATION       TUBAL LIGATION              Social History  Reviewed, and she  reports that she has never smoked. She has never used smokeless tobacco. She reports that she does not drink alcohol and does not use drugs.        Family History  Family History   Problem Relation Age of Onset     Family History Negative Other      Diabetes No family hx of      Coronary Artery Disease No family hx of      Cancer No family hx of      Heart Disease No family hx of       Psychosocial Needs  Social History     Social History Narrative     Not on file     Additional psychosocial needs reviewed per nursing assessment.       Allergies   Allergen Reactions     Nkda [No Known Drug Allergies]         Medications Prior to Admission   Medication Sig Dispense Refill Last Dose     acetaminophen (TYLENOL) 325 MG tablet Take 1-2 tablets (325-650 mg) by mouth every 6 hours as needed for mild pain 100 tablet 1 2022 at Unknown time     dapagliflozin (FARXIGA) 5 MG TABS tablet Take 1 tablet (5 mg) by mouth daily 90 tablet 3 Past Week at Unknown time     ferrous sulfate (FEROSUL) 325 (65 Fe) MG tablet Take 1 tablet (325 mg) by mouth 2 times daily (with meals) 180 tablet 3 Past Week at Unknown time     metFORMIN (GLUCOPHAGE XR) 500 MG 24 hr tablet Take 1 tablet (500 mg) by mouth daily (with dinner) for 7 days, THEN 2 tablets (1,000 mg) daily (with  dinner) for 7 days, THEN 3 tablets (1,500 mg) daily (with dinner) for 7 days, THEN 4 tablets (2,000 mg) daily (with dinner) for 99 days. (Patient taking differently: Take 2 tablets daily with dinner) 360 tablet 3 Past Week at Unknown time     senna-docusate (SENOKOT-S/PERICOLACE) 8.6-50 MG tablet Take 1 tablet by mouth daily 90 tablet 3 Past Week at Unknown time     blood glucose (NO BRAND SPECIFIED) test strip Use to test blood sugar 1 times daily or as directed. To accompany: Blood Glucose Monitor Brands: per insurance. 100 strip 3      blood glucose monitoring (NO BRAND SPECIFIED) meter device kit Use to test blood sugar 1 times daily or as directed. Preferred blood glucose meter OR supplies to accompany: Blood Glucose Monitor Brands: per insurance. 1 kit 0      thin (NO BRAND SPECIFIED) lancets Use with lanceting device. Use to test 1 times per day. To accompany: Blood Glucose Monitor Brands: per insurance. 100 each 3         Review of Systems:  Negative except for findings in the HPI Physical Exam:  Temp:  [97.8  F (36.6  C)-102  F (38.9  C)] 98.6  F (37  C)  Pulse:  [77-96] 83  Resp:  [22-32] 22  BP: (107-129)/(71-84) 123/84  SpO2:  [96 %-100 %] 97 %      Gen: Pleasant in no acute distress.  HEENT: NCAT. EOMI. PERRL.  Neck: No bruit, JVD or thyromegaly.  Lungs: Clear to ascultation bilat with no crackles or wheezes.  Card: RRR. NSR. No RMG. Peripheral pulses present and symmetric. No edema.  Abd: Soft NT ND. No mass. Normal bowel sounds.  Skin: No rash.  Extr: No edema.  Neuro: Alert and oriented to place time and person. Cranial nerves II to XII intact. Motor and sensory intact. Normal gait.           ============================    Pertinent Labs  personally reviewed.   Recent Labs   Lab 09/23/22  0553 09/22/22  0711 09/21/22  1444   WBC 5.7 7.0 14.0*   HGB 9.4* 9.6* 11.8   HCT 29.5* 30.0* 36.4    190 252       Recent Labs   Lab 09/23/22  0553 09/22/22  0711 09/21/22  1444    139 136   CO2 22 25  22   BUN 5.4* 5.4* 7.7   ALBUMIN 3.7 3.5 4.4   ALKPHOS 166* 151* 191*   ALT 46* 47* 71*   AST 35 25 37*       MICROBIOLOGY DATA:  Personally reviewed  critical data Blood Culture Peripheral Blood  Order: [id number]   Collected 9/21/2022  2:48 PM     Status: Preliminary result     Visible to patient: No (not released)    Specimen Information: Peripheral Blood         0 Result Notes    Culture Positive on the 1st day of incubation Abnormal        Escherichia coli Panic     2 of 2 bottles        Resulting Agency: [facility]       Susceptibility     Escherichia coli     [facility]     Ampicillin >=32 ug/mL Resistant     Ampicillin/ Sulbactam 16 ug/mL Intermediate     Cefepime <=1 ug/mL Susceptible     Ceftazidime <=1 ug/mL Susceptible     Ceftriaxone <=1 ug/mL Susceptible     Ciprofloxacin <=0.25 ug/mL Susceptible     Gentamicin <=1 ug/mL Susceptible     Levofloxacin <=0.12 ug/mL Susceptible     Meropenem <=0.25 ug/mL Susceptible     Piperacillin/Tazobactam <=4 ug/mL Susceptible     Tobramycin <=1 ug/mL Susceptible     Trimethoprim/Sulfamethoxazole <=1/19 ug/mL Susceptible                  Specimen Collected: 09/21/22  2:48 PM Last Resulted: 09/23/22 11:13 AM                  Pertinent Radiology  personally reviewed.      Study Result    Narrative & Impression   EXAM: CT ABDOMEN PELVIS W/O CONTRAST  LOCATION: [facility]  DATE/TIME: 9/21/2022 2:13 PM     INDICATION: hematuria, flank pain, fever  COMPARISON: None.  TECHNIQUE: CT scan of the abdomen and pelvis was performed without IV contrast. Multiplanar reformats were obtained. Dose reduction techniques were used.  CONTRAST: None.     FINDINGS:   LOWER CHEST: Normal.     HEPATOBILIARY: Diffuse hepatic steatosis. No significant mass. No bile duct dilatation. No calcified gallstones.     PANCREAS: Normal.     SPLEEN: Normal.     ADRENAL GLANDS: Normal.     KIDNEYS/BLADDER: The right kidney is enlarged. Filling of the right renal pelvis and both upper and lower  pole calyces with contiguous calcified material consistent with a large staghorn calculus. There is mild edema around the lower pole of the right   kidney and pararenal fascia. The left kidney is normal in size. No left nephrolithiasis. No hydroureter. Urinary bladder is decompressed.     BOWEL: Normal with no obstruction or acute inflammatory change. Nothing for appendicitis.     LYMPH NODES: Normal.     VASCULATURE: Unremarkable.     PELVIC ORGANS: The uterus is normal in size. Normal adnexa.     MUSCULOSKELETAL: Minimal lumbar degenerative osteophytes. No spondylolisthesis.                                                                      IMPRESSION:      1.  Staghorn calculus occupying the right renal pelvis extending into both upper and lower pole calyces. Related right kidney enlargement and mild perinephric edema.  2.  No left renal calculi.  3.  Hepatic steatosis.

## 2022-09-24 VITALS
HEIGHT: 62 IN | BODY MASS INDEX: 25.51 KG/M2 | HEART RATE: 83 BPM | WEIGHT: 138.6 LBS | SYSTOLIC BLOOD PRESSURE: 101 MMHG | DIASTOLIC BLOOD PRESSURE: 72 MMHG | OXYGEN SATURATION: 97 % | TEMPERATURE: 98.1 F | RESPIRATION RATE: 16 BRPM

## 2022-09-24 LAB
ALBUMIN SERPL BCG-MCNC: 4.3 G/DL (ref 3.5–5.2)
ALP SERPL-CCNC: 194 U/L (ref 35–104)
ALT SERPL W P-5'-P-CCNC: 56 U/L (ref 10–35)
ANION GAP SERPL CALCULATED.3IONS-SCNC: 11 MMOL/L (ref 7–15)
AST SERPL W P-5'-P-CCNC: 38 U/L (ref 10–35)
BACTERIA BLD CULT: ABNORMAL
BILIRUB SERPL-MCNC: <0.2 MG/DL
BUN SERPL-MCNC: 12.4 MG/DL (ref 6–20)
CALCIUM SERPL-MCNC: 9.5 MG/DL (ref 8.6–10)
CHLORIDE SERPL-SCNC: 105 MMOL/L (ref 98–107)
CREAT SERPL-MCNC: 0.49 MG/DL (ref 0.51–0.95)
DEPRECATED HCO3 PLAS-SCNC: 22 MMOL/L (ref 22–29)
ERYTHROCYTE [DISTWIDTH] IN BLOOD BY AUTOMATED COUNT: 14.2 % (ref 10–15)
GFR SERPL CREATININE-BSD FRML MDRD: >90 ML/MIN/1.73M2
GLUCOSE BLDC GLUCOMTR-MCNC: 126 MG/DL (ref 70–99)
GLUCOSE BLDC GLUCOMTR-MCNC: 140 MG/DL (ref 70–99)
GLUCOSE SERPL-MCNC: 106 MG/DL (ref 70–99)
HCT VFR BLD AUTO: 35.6 % (ref 35–47)
HGB BLD-MCNC: 11.3 G/DL (ref 11.7–15.7)
MCH RBC QN AUTO: 26.5 PG (ref 26.5–33)
MCHC RBC AUTO-ENTMCNC: 31.7 G/DL (ref 31.5–36.5)
MCV RBC AUTO: 83 FL (ref 78–100)
PLATELET # BLD AUTO: 296 10E3/UL (ref 150–450)
POTASSIUM SERPL-SCNC: 3.9 MMOL/L (ref 3.4–5.3)
PROT SERPL-MCNC: 8.4 G/DL (ref 6.4–8.3)
RBC # BLD AUTO: 4.27 10E6/UL (ref 3.8–5.2)
SODIUM SERPL-SCNC: 138 MMOL/L (ref 136–145)
WBC # BLD AUTO: 5.3 10E3/UL (ref 4–11)

## 2022-09-24 PROCEDURE — 82040 ASSAY OF SERUM ALBUMIN: CPT

## 2022-09-24 PROCEDURE — 36415 COLL VENOUS BLD VENIPUNCTURE: CPT

## 2022-09-24 PROCEDURE — 85027 COMPLETE CBC AUTOMATED: CPT

## 2022-09-24 PROCEDURE — 99238 HOSP IP/OBS DSCHRG MGMT 30/<: CPT | Mod: GC | Performed by: STUDENT IN AN ORGANIZED HEALTH CARE EDUCATION/TRAINING PROGRAM

## 2022-09-24 PROCEDURE — 80053 COMPREHEN METABOLIC PANEL: CPT

## 2022-09-24 PROCEDURE — 250N000011 HC RX IP 250 OP 636

## 2022-09-24 RX ORDER — CEFUROXIME AXETIL 500 MG/1
500 TABLET ORAL 2 TIMES DAILY
Qty: 42 TABLET | Refills: 0 | Status: SHIPPED | OUTPATIENT
Start: 2022-09-24 | End: 2022-11-08

## 2022-09-24 RX ORDER — CEFUROXIME AXETIL 500 MG/1
500 TABLET ORAL 2 TIMES DAILY
Qty: 42 TABLET | Refills: 0 | Status: SHIPPED | OUTPATIENT
Start: 2022-09-24 | End: 2022-09-24

## 2022-09-24 RX ADMIN — INSULIN ASPART 1 UNITS: 100 INJECTION, SOLUTION INTRAVENOUS; SUBCUTANEOUS at 08:50

## 2022-09-24 RX ADMIN — CEFTRIAXONE SODIUM 1 G: 1 INJECTION, POWDER, FOR SOLUTION INTRAMUSCULAR; INTRAVENOUS at 13:20

## 2022-09-24 ASSESSMENT — ACTIVITIES OF DAILY LIVING (ADL)
ADLS_ACUITY_SCORE: 20

## 2022-09-24 NOTE — PLAN OF CARE
Problem: Plan of Care - These are the overarching goals to be used throughout the patient stay.    Goal: Plan of Care Review/Shift Note  Outcome: Ongoing, Progressing     Problem: Infection  Goal: Absence of Infection Signs and Symptoms  Outcome: Ongoing, Progressing     Problem: Diabetes Comorbidity  Goal: Blood Glucose Level Within Targeted Range  Outcome: Ongoing, Progressing   Goal Outcome Evaluation:      [id number]... Pt had a restful shift, c/o pain (headache) and given PRN tylenol 650 mg at end of shift. She's voiding freely and output has charted. She's on tele with normal sinus rhythm, BG= 144 and 135 mg/dL. VSS

## 2022-09-24 NOTE — PLAN OF CARE
"  Problem: Plan of Care - These are the overarching goals to be used throughout the patient stay.    Goal: Plan of Care Review/Shift Note  Description: The Plan of Care Review/Shift note should be completed every shift.  The Outcome Evaluation is a brief statement about your assessment that the patient is improving, declining, or no change.  This information will be displayed automatically on your shift note.  Outcome: Ongoing, Progressing  Goal: Patient-Specific Goal (Individualized)  Description: You can add care plan individualizations to a care plan. Examples of Individualization might be:  \"Parent requests to be called daily at 9am for status\", \"I have a hard time hearing out of my right ear\", or \"Do not touch me to wake me up as it startles me\".  Outcome: Ongoing, Progressing  Goal: Absence of Hospital-Acquired Illness or Injury  Outcome: Ongoing, Progressing  Intervention: Identify and Manage Fall Risk  Recent Flowsheet Documentation  Taken 9/24/2022 0530 by Mila Walton, RN  Safety Promotion/Fall Prevention:   activity supervised   assistive device/personal items within reach   clutter free environment maintained   nonskid shoes/slippers when out of bed   patient and family education   safety round/check completed  Taken 9/24/2022 0134 by Mila Walton, RN  Safety Promotion/Fall Prevention:   activity supervised   assistive device/personal items within reach   clutter free environment maintained   nonskid shoes/slippers when out of bed   patient and family education   safety round/check completed  Intervention: Prevent and Manage VTE (Venous Thromboembolism) Risk  Recent Flowsheet Documentation  Taken 9/23/2022 2331 by Mila Walton, RN  Activity Management: activity adjusted per tolerance  Goal: Optimal Comfort and Wellbeing  Outcome: Ongoing, Progressing  Goal: Readiness for Transition of Care  Outcome: Ongoing, Progressing   Goal Outcome Evaluation:        Pt independent in her " room. Had Tylenol for pain at beginning of shift which was helpful. PT makes needs known. Will cont to monitor.

## 2022-09-24 NOTE — PLAN OF CARE
Problem: Plan of Care - These are the overarching goals to be used throughout the patient stay.    Goal: Absence of Hospital-Acquired Illness or Injury  Intervention: Identify and Manage Fall Risk  Recent Flowsheet Documentation  Taken 9/24/2022 3789 by Yesica Holliday RN  Safety Promotion/Fall Prevention:    clutter free environment maintained    patient and family education    safety round/check completed  Pt is independent in the room  No safety incident this shift.     Problem: Diabetes Comorbidity  Goal: Blood Glucose Level Within Targeted Range  Outcome: Ongoing, Progressing  140,126; insulin per sliding scale.   K+ 3.9.   Telemetry: NSR    Pt denied pain during this shift.  Pt is anticipating discharge.  PIV removed; tolerated well.  Family present; Discharge instruction given to pt/.  No further question at this time.   Discharge note for work given to pt.  Pt discharged with family at 2:05 pm, ambulatory.       Goal Outcome Evaluation:

## 2022-09-24 NOTE — DISCHARGE SUMMARY
[facility]  Discharge Summary - Medicine & Pediatrics       Date of Admission:  9/21/2022  Date of Discharge:  9/24/2022  Discharging Provider: Bill  Discharge Service: Hospitalist Service    Discharge Diagnoses   (N10) Pyelonephritis, acute  (N20.0) Staghorn calculus  (A41.9) Sepsis without acute organ dysfunction, due to unspecified organism (H)      Follow-ups Needed After Discharge   Follow-up Appointments     Follow-up and recommended labs and tests       Follow up with primary care provider, Sona Bran, within 7 days for   hospital follow- up.  No follow up labs or test are needed.             Unresulted Labs Ordered in the Past 30 Days of this Admission     No orders found from 8/22/2022 to 9/22/2022.      These results will be followed up by PCP    Discharge Disposition   Discharged to home  Condition at discharge: Stable      Hospital Course   Lizzy Wright was admitted on 9/21/2022 for pyelonephritis with right-sided staghorn calculi.  The following problems were addressed during her hospitalization:    Patient admitted with 2 weeks of back pain, chills and fever found to have complicated UTI in the setting of a staghorn calculi in right renal pelvis.  Case was discussed with urology and given location of the stone there was no intervention recommended, patient was treated with IV ceftriaxone.  She was discharged with oral antibiotic regimen and plan to follow-up in about 3 weeks with urology to remove stone.    Pyelonephritis  Right-sided staghorn calculi  -Follow-up with urology, likely procedure in about 3 weeks  -Cefuroxime 500 mg twice daily, will need to stay on until 48 hours after procedures completed     Mild transaminitis, improving  Diffuse hepatic steatosis on CT abdomen  -Follow-up outpatient    Consultations This Hospital Stay   UROLOGY IP CONSULT  INFECTIOUS DISEASES IP CONSULT    Code Status   Full Code       The patient was discussed with Dr. Bill Pierre  SUE Burns MD  Resident service  [place] [address]  [address] [facility] [address]  Phone: [phone]  Fax: [phone]  ______________________________________________________________________    Physical Exam   Vital Signs: Temp: 98  F (36.7  C) Temp src: Oral BP: 118/76 Pulse: 72   Resp: 16 SpO2: 96 % O2 Device: None (Room air)    Weight: 138 lbs 9.6 oz  Constitutional: awake, alert, cooperative, no apparent distress, and appears stated age  Eyes: Lids and lashes normal, pupils equal, round   Respiratory: No increased work of breathing, good air exchange, clear to auscultation bilaterally, no crackles or wheezing  Cardiovascular: Normal apical impulse, regular rate and rhythm, normal S1 and S2, no S3 or S4, and no murmur noted  GI: No scars, normal bowel sounds, soft, non-distended, non-tender, no masses palpated, no hepatosplenomegally  Skin: no bruising or bleeding  Musculoskeletal: There is no redness, warmth, or swelling of the joints.grossly intact.  Motor is 5 out of 5 bilaterally.   Neuropsychiatric: General: normal, calm and normal eye contact      Primary Care Physician   Sona Bran    Discharge Orders      Reason for your hospital stay    Pyelonephritis     Follow-up and recommended labs and tests     Follow up with primary care provider, Sona Bran, within 7 days for hospital follow- up.  No follow up labs or test are needed.     Activity    Your activity upon discharge: activity as tolerated     Diet    Follow this diet upon discharge: Orders Placed This Encounter      Moderate Consistent Carb (60 g CHO per Meal) Diet       Significant Results and Procedures   Most Recent 3 CBC's:Recent Labs   Lab Test 09/24/22  0525 09/23/22  0553 09/22/22  0711   WBC 5.3 5.7 7.0   HGB 11.3* 9.4* 9.6*   MCV 83 84 83    199 190     Most Recent 3 BMP's:Recent Labs   Lab Test 09/24/22  0800 09/24/22  0525 09/23/22  2111 09/23/22  1912 09/23/22  0821 09/23/22  0553  09/22/22  0826 09/22/22  0711   NA  --  138  --   --   --  140  --  139   POTASSIUM  --  3.9  --  4.1  --  3.4   < > 3.3*   CHLORIDE  --  105  --   --   --  108*  --  106   CO2  --  22  --   --   --  22  --  25   BUN  --  12.4  --   --   --  5.4*  --  5.4*   CR  --  0.49*  --   --   --  0.46*  --  0.57   ANIONGAP  --  11  --   --   --  10  --  8   SABINE  --  9.5  --   --   --  8.7  --  8.3*   * 106* 135*  --    < > 120*   < > 120*    < > = values in this interval not displayed.     Most Recent 2 LFT's:Recent Labs   Lab Test 09/24/22  0525 09/23/22  0553   AST 38* 35   ALT 56* 46*   ALKPHOS 194* 166*   BILITOTAL <0.2 <0.2       Discharge Medications   Current Discharge Medication List      START taking these medications    Details   cefuroxime (CEFTIN) 500 MG tablet Take 1 tablet (500 mg) by mouth 2 times daily  Qty: 42 tablet, Refills: 0    Comments: You will need to take this medication until you see the urologist.  Associated Diagnoses: Pyelonephritis, acute; Staghorn calculus         CONTINUE these medications which have NOT CHANGED    Details   acetaminophen (TYLENOL) 325 MG tablet Take 1-2 tablets (325-650 mg) by mouth every 6 hours as needed for mild pain  Qty: 100 tablet, Refills: 1    Associated Diagnoses: Routine general medical examination at a health care facility      dapagliflozin (FARXIGA) 5 MG TABS tablet Take 1 tablet (5 mg) by mouth daily  Qty: 90 tablet, Refills: 3    Associated Diagnoses: Diabetes mellitus with proteinuria (H)      ferrous sulfate (FEROSUL) 325 (65 Fe) MG tablet Take 1 tablet (325 mg) by mouth 2 times daily (with meals)  Qty: 180 tablet, Refills: 3    Associated Diagnoses: Other iron deficiency anemia      metFORMIN (GLUCOPHAGE XR) 500 MG 24 hr tablet Take 1 tablet (500 mg) by mouth daily (with dinner) for 7 days, THEN 2 tablets (1,000 mg) daily (with dinner) for 7 days, THEN 3 tablets (1,500 mg) daily (with dinner) for 7 days, THEN 4 tablets (2,000 mg) daily (with dinner)  for 99 days.  Qty: 360 tablet, Refills: 3    Associated Diagnoses: Diabetes mellitus with proteinuria (H)      senna-docusate (SENOKOT-S/PERICOLACE) 8.6-50 MG tablet Take 1 tablet by mouth daily  Qty: 90 tablet, Refills: 3    Associated Diagnoses: Other iron deficiency anemia      blood glucose (NO BRAND SPECIFIED) test strip Use to test blood sugar 1 times daily or as directed. To accompany: Blood Glucose Monitor Brands: per insurance.  Qty: 100 strip, Refills: 3    Associated Diagnoses: Diabetes mellitus with proteinuria (H)      blood glucose monitoring (NO BRAND SPECIFIED) meter device kit Use to test blood sugar 1 times daily or as directed. Preferred blood glucose meter OR supplies to accompany: Blood Glucose Monitor Brands: per insurance.  Qty: 1 kit, Refills: 0    Associated Diagnoses: Diabetes mellitus with proteinuria (H)      thin (NO BRAND SPECIFIED) lancets Use with lanceting device. Use to test 1 times per day. To accompany: Blood Glucose Monitor Brands: per insurance.  Qty: 100 each, Refills: 3    Associated Diagnoses: Diabetes mellitus with proteinuria (H)           Allergies   Allergies   Allergen Reactions     Nkda [No Known Drug Allergies]

## 2022-09-27 ENCOUNTER — PATIENT OUTREACH (OUTPATIENT)
Dept: CARE COORDINATION | Facility: CLINIC | Age: 42
End: 2022-09-27

## 2022-09-27 NOTE — PROGRESS NOTES
Clinic Care Coordination Contact  [facility]/Voicemail       Clinical Data: Care Coordinator Outreach  Outreach attempted x [phone].Tabatha  unable to leave a  message voicemail has not been set up yet. Care Coordinator will do no further outreaches at this time.    Belkis Martins  [phone]  Care

## 2022-09-28 ENCOUNTER — OFFICE VISIT (OUTPATIENT)
Dept: FAMILY MEDICINE | Facility: CLINIC | Age: 42
End: 2022-09-28
Payer: COMMERCIAL

## 2022-09-28 VITALS
OXYGEN SATURATION: 99 % | TEMPERATURE: 98 F | WEIGHT: 137 LBS | BODY MASS INDEX: 25.06 KG/M2 | SYSTOLIC BLOOD PRESSURE: 125 MMHG | RESPIRATION RATE: 20 BRPM | HEART RATE: 70 BPM | DIASTOLIC BLOOD PRESSURE: 82 MMHG

## 2022-09-28 DIAGNOSIS — Z09 HOSPITAL DISCHARGE FOLLOW-UP: Primary | ICD-10-CM

## 2022-09-28 DIAGNOSIS — Z23 NEED FOR PROPHYLACTIC VACCINATION AND INOCULATION AGAINST INFLUENZA: ICD-10-CM

## 2022-09-28 DIAGNOSIS — Z23 HIGH PRIORITY FOR 2019-NCOV VACCINE: ICD-10-CM

## 2022-09-28 DIAGNOSIS — N20.0 STAGHORN CALCULUS: ICD-10-CM

## 2022-09-28 PROCEDURE — 99496 TRANSJ CARE MGMT HIGH F2F 7D: CPT | Mod: GC

## 2022-09-28 PROCEDURE — 90686 IIV4 VACC NO PRSV 0.5 ML IM: CPT

## 2022-09-28 PROCEDURE — 90471 IMMUNIZATION ADMIN: CPT

## 2022-09-28 PROCEDURE — 91312 COVID-19,PF,PFIZER BOOSTER BIVALENT: CPT

## 2022-09-28 PROCEDURE — 0124A COVID-19,PF,PFIZER BOOSTER BIVALENT: CPT

## 2022-09-28 RX ORDER — CEFUROXIME AXETIL 500 MG/1
500 TABLET ORAL 2 TIMES DAILY
Qty: 60 UNITS | Refills: 0 | Status: SHIPPED | OUTPATIENT
Start: 2022-09-28 | End: 2022-09-28

## 2022-09-28 RX ORDER — CEFUROXIME AXETIL 500 MG/1
500 TABLET ORAL 2 TIMES DAILY
Qty: 30 TABLET | Refills: 1 | Status: SHIPPED | OUTPATIENT
Start: 2022-09-28 | End: 2022-11-08

## 2022-09-28 NOTE — PROGRESS NOTES
"  Assessment & Plan     Hospital discharge follow-up  Staghorn calculus  -Presents to the clinic today for hospital follow-up.  She was diagnosed with right-sided acute pyelonephritis, right-sided staghorn calculus, and sepsis without acute organ dysfunction, due to unspecified organism.    -Notes reviewed from hospital visit  -Discussed with the patient that it was recommended at her hospital discharge to take Ceftin 500 mg twice daily.  The patient states that she does not believe she has picked up this medication at this time.  Medication was reordered and sent to her pharmacy on file.  - cefuroxime (CEFTIN) 500 MG tablet; Take 1 tablet (500 mg) by mouth 2 times daily  -Patient was informed to follow-up with urology in approximately 3 weeks for likely procedure involving staghorn calculus.  -Staff will be informed to help facilitate urology appointment.  -Discussed with patient that she should follow-up in clinic after her urology visit, in approximately 1 month.  At which time we will discuss further work-up the mild transaminitis and hepatic steatosis noted on CT during her hospitalization.  -Patient affirmed understanding of the plan and had no additional questions at this time.      High priority for 2019-nCoV vaccine  Need for prophylactic vaccination and inoculation against influenza  -Discussed with patient the recommendations for COVID 19 booster and influenza vaccine.  She was interested in both of these vaccines and agreed to their administration today in clinic.  - COVID-19,PF,PFIZER BOOSTER BIVALENT 12+Yrs  - INFLUENZA VACCINE IM > 6 MONTHS VALENT IIV4 (AFLURIA/FLUZONE)      BMI:   Estimated body mass index is 25.06 kg/m  as calculated from the following:    Height as of 9/21/22: 1.575 m (5' 2\").    Weight as of this encounter: 62.1 kg (137 lb).       Discussed with patient that she should follow-up with urology for likely procedure in approximately 3 weeks.  Following this, the patient should " schedule an appointment at our clinic for a follow-up visit, which is expected to be in approximately 4 weeks from today's visit.    No follow-ups on file.    Albert Lyman DO  [facility] JEAN Ball is a 41 year old, presenting for the following health issues:  Hospital F/U (Follow up [facility] for Kidney infection), Medication Problem (Per pt states that she was not able to  the Ceftin- they told her that her provider did not sent it yet), Imm/Inj (COVID-19 VACCINE), and Imm/Inj (Flu Shot)      HPI   Patient affirms lower back discomfort bilaterally. 5/10 pain, which she states is improved from the hospital. Patient does not believe she is taking the antibiotic prescribed from hospital.  She was diagnosed with right staghorn calculus in the hospital, however states that she has bilateral lower back pain, with left side being slightly more tender than right side.  Patient states she initially had blood in her urine, however she states that this has resolved.  She states that her pain is much improved from the hospital. She reports occasional headaches, which she states is chronic for her.  Patient does not believe she has follow-up scheduled for urology at this time and states she has missed a few phone calls over the past week.  She is interested in getting her for COVID booster and flu shot today.  She reports no other concerns at this time.    Hospital Follow-up Visit:    Hospital/Nursing Home/IP Rehab Facility: [facility]  Date of Admission: 09/21/2022  Date of Discharge: 09/24/2022  Reason(s) for Admission: Staghorn calculus, right    Was your hospitalization related to COVID-19? No   Problems taking medications regularly:  Yes, the patient does not believe she filled the prescribed antibiotics at the pharmacy  Medication changes since discharge: None, cefuroxime 500 mg twice daily was represcribed to her clinic on file  Problems adhering to  non-medication therapy:  None    Summary of hospitalization:  [facility] discharge summary reviewed  Diagnostic Tests/Treatments reviewed.  Follow up needed: Follow-up with urology in approximately 3 weeks.  Follow-up with [place] clinic in approximately 4 weeks following urology procedure.  Other Healthcare Providers Involved in Patient s Care:         Specialist appointment - Urology  Update since discharge: improved.       Plan of care communicated with patient      Review of Systems   Constitutional, HEENT, cardiovascular, pulmonary, gi and gu systems are negative, except as otherwise noted.    ROS reviewed, see HPI for pertinent positives and negatives  Albert Lyman DO        Objective    /82   Pulse 70   Temp 98  F (36.7  C) (Oral)   Resp 20   Wt 62.1 kg (137 lb)   LMP 09/16/2022 (Exact Date)   SpO2 99%   BMI 25.06 kg/m    Body mass index is 25.06 kg/m .     Physical Exam   GENERAL: healthy, alert and no distress  EYES: Eyes grossly normal to inspection, PERRL and conjunctivae and sclerae normal  HENT: External ears normal, nose and mouth without ulcers or lesions  NECK: no adenopathy, no asymmetry, masses  RESP: lungs clear to auscultation - no rales, rhonchi or wheezes  CV: regular rate and rhythm, normal S1 S2, no murmur, click or rub, no peripheral edema and peripheral pulses strong  ABDOMEN: soft, nontender, no hepatosplenomegaly, no masses and bowel sounds normal  MS: no gross musculoskeletal defects noted, no edema  BACK-CVA tenderness present on the left, absent on the right  SKIN: no suspicious lesions or rashes  NEURO: Normal strength and tone, mentation intact and speech normal  PSYCH: mentation appears normal, affect normal/bright    Admission on 09/21/2022, Discharged on 09/24/2022   Component Date Value Ref Range Status     Sodium 09/21/2022 136  136 - 145 mmol/L Final     Potassium 09/21/2022 3.3 (A) 3.4 - 5.3 mmol/L Final     Chloride 09/21/2022 98  98 - 107  mmol/L Final     Carbon Dioxide (CO2) 09/21/2022 22  22 - 29 mmol/L Final     Anion Gap 09/21/2022 16 (A) 7 - 15 mmol/L Final     Urea Nitrogen 09/21/2022 7.7  6.0 - 20.0 mg/dL Final     Creatinine 09/21/2022 0.55  0.51 - 0.95 mg/dL Final     Calcium 09/21/2022 9.3  8.6 - 10.0 mg/dL Final     Glucose 09/21/2022 139 (A) 70 - 99 mg/dL Final     Alkaline Phosphatase 09/21/2022 191 (A) 35 - 104 U/L Final     AST 09/21/2022 37 (A) 10 - 35 U/L Final     ALT 09/21/2022 71 (A) 10 - 35 U/L Final     Protein Total 09/21/2022 8.5 (A) 6.4 - 8.3 g/dL Final     Albumin 09/21/2022 4.4  3.5 - 5.2 g/dL Final     Bilirubin Total 09/21/2022 0.4  <=1.2 mg/dL Final     GFR Estimate 09/21/2022 >90  >60 mL/min/1.73m2 Final    Effective December 21, 2021 eGFRcr in adults is calculated using the 2021 CKD-EPI creatinine equation which includes age and gender (Nellie et al., NEJM, DOI: 10.1056/TMMCvm7820814)     Lactic Acid 09/21/2022 1.1  0.7 - 2.0 mmol/L Final     Ventricular Rate 09/21/2022 127  BPM Final     Atrial Rate 09/21/2022 127  BPM Final     WV Interval 09/21/2022 148  ms Final     QRS Duration 09/21/2022 82  ms Final     QT 09/21/2022 292  ms Final     QTc 09/21/2022 424  ms Final     P Axis 09/21/2022 50  degrees Final     R AXIS 09/21/2022 19  degrees Final     T [place] 09/21/2022 4  degrees Final     Interpretation ECG 09/21/2022    Final                    Value:Sinus tachycardia  Possible Left atrial enlargement  ST & T wave abnormality, consider anterior ischemia  Abnormal ECG  No previous ECGs available  Confirmed by SEE ED PROVIDER NOTE FOR, ECG INTERPRETATION ([id number]),  PIETER HOWELL ([id number]) on 9/22/2022 7:46:33 AM       Color Urine 09/21/2022 Yellow  Colorless, Straw, Light Yellow, Yellow Final     Appearance Urine 09/21/2022 Turbid (A) Clear Final     Glucose Urine 09/21/2022 Negative  Negative mg/dL Final     Bilirubin Urine 09/21/2022 Negative  Negative Final     Ketones Urine 09/21/2022 40  (A) Negative mg/dL  Final     Specific Gravity Urine 09/21/2022 1.016  1.001 - 1.030 Final     Blood Urine 09/21/2022 >1.0 mg/dL (A) Negative Final     pH Urine 09/21/2022 7.5 (A) 5.0 - 7.0 Final     Protein Albumin Urine 09/21/2022 200  (A) Negative mg/dL Final     Urobilinogen Urine 09/21/2022 <2.0  <2.0 mg/dL Final     Nitrite Urine 09/21/2022 Positive (A) Negative Final     Leukocyte Esterase Urine 09/21/2022 500 Jessie/uL (A) Negative Final     Bacteria Urine 09/21/2022 Many (A) None Seen /HPF Final     RBC Urine 09/21/2022 56 (A) <=2 /HPF Final     WBC Urine 09/21/2022 >182 (A) <=5 /HPF Final     Squamous Epithelials Urine 09/21/2022 10 (A) <=1 /HPF Final     Hyaline Casts Urine 09/21/2022 3 (A) <=2 /LPF Final     Culture 09/21/2022 Positive on the 1st day of incubation (A)  Final     Culture 09/21/2022 Escherichia coli (A)  Final    2 of 2 bottles  Susceptibilities done on previous cultures     Culture 09/21/2022 Positive on the 1st day of incubation (A)  Final     Culture 09/21/2022 Escherichia coli (A)  Final    2 of 2 bottles     Culture 09/21/2022 >100,000 CFU/mL Mixture of urogenital kaveh   Final     WBC Count 09/21/2022 14.0 (A) 4.0 - 11.0 10e3/uL Final     RBC Count 09/21/2022 4.44  3.80 - 5.20 10e6/uL Final     Hemoglobin 09/21/2022 11.8  11.7 - 15.7 g/dL Final     Hematocrit 09/21/2022 36.4  35.0 - 47.0 % Final     MCV 09/21/2022 82  78 - 100 fL Final     MCH 09/21/2022 26.6  26.5 - 33.0 pg Final     MCHC 09/21/2022 32.4  31.5 - 36.5 g/dL Final     RDW 09/21/2022 14.0  10.0 - 15.0 % Final     Platelet Count 09/21/2022 252  150 - 450 10e3/uL Final     % Neutrophils 09/21/2022 87  % Final     % Lymphocytes 09/21/2022 5  % Final     % Monocytes 09/21/2022 7  % Final     % Eosinophils 09/21/2022 0  % Final     % Basophils 09/21/2022 0  % Final     % Immature Granulocytes 09/21/2022 1  % Final     NRBCs per 100 WBC 09/21/2022 0  <1 /100 Final     Absolute Neutrophils 09/21/2022 12.2 (A) 1.6 - 8.3 10e3/uL Final     Absolute  Lymphocytes 09/21/2022 0.8  0.8 - 5.3 10e3/uL Final     Absolute Monocytes 09/21/2022 1.0  0.0 - 1.3 10e3/uL Final     Absolute Eosinophils 09/21/2022 0.0  0.0 - 0.7 10e3/uL Final     Absolute Basophils 09/21/2022 0.0  0.0 - 0.2 10e3/uL Final     Absolute Immature Granulocytes 09/21/2022 0.1  <=0.4 10e3/uL Final     Absolute NRBCs 09/21/2022 0.0  10e3/uL Final     SARS CoV2 PCR 09/21/2022 Negative  Negative Final    NEGATIVE: SARS-CoV-2 (COVID-19) RNA not detected, presumed negative.     Hemoglobin A1C 09/21/2022 8.1 (A) <5.7 % Final    Normal <5.7%   Prediabetes 5.7-6.4%    Diabetes 6.5% or higher     Note: Adopted from ADA consensus guidelines.     Potassium 09/22/2022 3.1 (A) 3.4 - 5.3 mmol/L Final     GLUCOSE BY METER POCT 09/22/2022 148 (A) 70 - 99 mg/dL Final     Acinetobacter species 09/21/2022 Not Detected  Not Detected Final     Citrobacter species 09/21/2022 Not Detected  Not Detected Final     Enterobacter species 09/21/2022 Not Detected  Not Detected Final     Proteus species 09/21/2022 Not Detected  Not Detected Final     Escherichia coli 09/21/2022 Detected (A) Not Detected Final    Positive for Escherichia coli by Verigene multiplex nucleic acid test. Final identification and antimicrobial susceptibility testing will be verified by standard methods. Verigene test will not distinguish E. coli from Shigella species including Shigella dysenteriae, Shigella flexneri, Shigella boydii, and Shigella sonnei. Specimens containing Shigella species or E. coli will be reported as positive for E. coli.     Klebsiella pneumoniae 09/21/2022 Not Detected  Not Detected Final     Klebsiella oxytoca 09/21/2022 Not Detected  Not Detected Final     Pseudomonas aeruginosa 09/21/2022 Not Detected  Not Detected Final     CTX-M 09/21/2022 Not Detected  Not Detected, NA Final     KPC 09/21/2022 Not Detected  Not Detected, NA Final     NDM 09/21/2022 Not Detected  Not Detected, NA Final     VIM 09/21/2022 Not Detected  Not  Detected, NA Final     IMP 09/21/2022 Not Detected  Not Detected, NA Final     OXA 09/21/2022 Not Detected  Not Detected, NA Final     Potassium 09/22/2022 3.6  3.4 - 5.3 mmol/L Final     WBC Count 09/22/2022 7.0  4.0 - 11.0 10e3/uL Final     RBC Count 09/22/2022 3.60 (A) 3.80 - 5.20 10e6/uL Final     Hemoglobin 09/22/2022 9.6 (A) 11.7 - 15.7 g/dL Final     Hematocrit 09/22/2022 30.0 (A) 35.0 - 47.0 % Final     MCV 09/22/2022 83  78 - 100 fL Final     MCH 09/22/2022 26.7  26.5 - 33.0 pg Final     MCHC 09/22/2022 32.0  31.5 - 36.5 g/dL Final     RDW 09/22/2022 14.3  10.0 - 15.0 % Final     Platelet Count 09/22/2022 190  150 - 450 10e3/uL Final     Sodium 09/22/2022 139  136 - 145 mmol/L Final     Potassium 09/22/2022 3.3 (A) 3.4 - 5.3 mmol/L Final     Chloride 09/22/2022 106  98 - 107 mmol/L Final     Carbon Dioxide (CO2) 09/22/2022 25  22 - 29 mmol/L Final     Anion Gap 09/22/2022 8  7 - 15 mmol/L Final     Urea Nitrogen 09/22/2022 5.4 (A) 6.0 - 20.0 mg/dL Final     Creatinine 09/22/2022 0.57  0.51 - 0.95 mg/dL Final     Calcium 09/22/2022 8.3 (A) 8.6 - 10.0 mg/dL Final     Glucose 09/22/2022 120 (A) 70 - 99 mg/dL Final     Alkaline Phosphatase 09/22/2022 151 (A) 35 - 104 U/L Final     AST 09/22/2022 25  10 - 35 U/L Final     ALT 09/22/2022 47 (A) 10 - 35 U/L Final     Protein Total 09/22/2022 7.0  6.4 - 8.3 g/dL Final     Albumin 09/22/2022 3.5  3.5 - 5.2 g/dL Final     Bilirubin Total 09/22/2022 0.2  <=1.2 mg/dL Final     GFR Estimate 09/22/2022 >90  >60 mL/min/1.73m2 Final    Effective December 21, 2021 eGFRcr in adults is calculated using the 2021 CKD-EPI creatinine equation which includes age and gender (Nellie et al., NEJ, DOI: 10.1056/ZAHOlc2787315)     GLUCOSE BY METER POCT 09/22/2022 101 (A) 70 - 99 mg/dL Final     GLUCOSE BY METER POCT 09/22/2022 113 (A) 70 - 99 mg/dL Final     GLUCOSE BY METER POCT 09/22/2022 150 (A) 70 - 99 mg/dL Final     GLUCOSE BY METER POCT 09/22/2022 113 (A) 70 - 99 mg/dL Final      WBC Count 09/23/2022 5.7  4.0 - 11.0 10e3/uL Final     RBC Count 09/23/2022 3.53 (A) 3.80 - 5.20 10e6/uL Final     Hemoglobin 09/23/2022 9.4 (A) 11.7 - 15.7 g/dL Final     Hematocrit 09/23/2022 29.5 (A) 35.0 - 47.0 % Final     MCV 09/23/2022 84  78 - 100 fL Final     MCH 09/23/2022 26.6  26.5 - 33.0 pg Final     MCHC 09/23/2022 31.9  31.5 - 36.5 g/dL Final     RDW 09/23/2022 14.2  10.0 - 15.0 % Final     Platelet Count 09/23/2022 199  150 - 450 10e3/uL Final     Sodium 09/23/2022 140  136 - 145 mmol/L Final     Potassium 09/23/2022 3.4  3.4 - 5.3 mmol/L Final     Chloride 09/23/2022 108 (A) 98 - 107 mmol/L Final     Carbon Dioxide (CO2) 09/23/2022 22  22 - 29 mmol/L Final     Anion Gap 09/23/2022 10  7 - 15 mmol/L Final     Urea Nitrogen 09/23/2022 5.4 (A) 6.0 - 20.0 mg/dL Final     Creatinine 09/23/2022 0.46 (A) 0.51 - 0.95 mg/dL Final     Calcium 09/23/2022 8.7  8.6 - 10.0 mg/dL Final     Glucose 09/23/2022 120 (A) 70 - 99 mg/dL Final     Alkaline Phosphatase 09/23/2022 166 (A) 35 - 104 U/L Final     AST 09/23/2022 35  10 - 35 U/L Final     ALT 09/23/2022 46 (A) 10 - 35 U/L Final     Protein Total 09/23/2022 7.3  6.4 - 8.3 g/dL Final     Albumin 09/23/2022 3.7  3.5 - 5.2 g/dL Final     Bilirubin Total 09/23/2022 <0.2  <=1.2 mg/dL Final     GFR Estimate 09/23/2022 >90  >60 mL/min/1.73m2 Final    Effective December 21, 2021 eGFRcr in adults is calculated using the 2021 CKD-EPI creatinine equation which includes age and gender (Nellie et al., NEJ, DOI: 10.1056/CLHFng1901144)     Lactic Acid 09/23/2022 0.7  0.7 - 2.0 mmol/L Final     GLUCOSE BY METER POCT 09/23/2022 95  70 - 99 mg/dL Final     GLUCOSE BY METER POCT 09/23/2022 125 (A) 70 - 99 mg/dL Final     Potassium 09/23/2022 4.1  3.4 - 5.3 mmol/L Final     GLUCOSE BY METER POCT 09/23/2022 144 (A) 70 - 99 mg/dL Final     GLUCOSE BY METER POCT 09/23/2022 135 (A) 70 - 99 mg/dL Final     WBC Count 09/24/2022 5.3  4.0 - 11.0 10e3/uL Final     RBC Count 09/24/2022 4.27   3.80 - 5.20 10e6/uL Final     Hemoglobin 09/24/2022 11.3 (A) 11.7 - 15.7 g/dL Final     Hematocrit 09/24/2022 35.6  35.0 - 47.0 % Final     MCV 09/24/2022 83  78 - 100 fL Final     MCH 09/24/2022 26.5  26.5 - 33.0 pg Final     MCHC 09/24/2022 31.7  31.5 - 36.5 g/dL Final     RDW 09/24/2022 14.2  10.0 - 15.0 % Final     Platelet Count 09/24/2022 296  150 - 450 10e3/uL Final    This result has been called to DELTA INCREASE by Grace Regan on 09 24 2022 at 0601, and has not been read back.      Sodium 09/24/2022 138  136 - 145 mmol/L Final     Potassium 09/24/2022 3.9  3.4 - 5.3 mmol/L Final     Chloride 09/24/2022 105  98 - 107 mmol/L Final     Carbon Dioxide (CO2) 09/24/2022 22  22 - 29 mmol/L Final     Anion Gap 09/24/2022 11  7 - 15 mmol/L Final     Urea Nitrogen 09/24/2022 12.4  6.0 - 20.0 mg/dL Final     Creatinine 09/24/2022 0.49 (A) 0.51 - 0.95 mg/dL Final     Calcium 09/24/2022 9.5  8.6 - 10.0 mg/dL Final     Glucose 09/24/2022 106 (A) 70 - 99 mg/dL Final     Alkaline Phosphatase 09/24/2022 194 (A) 35 - 104 U/L Final     AST 09/24/2022 38 (A) 10 - 35 U/L Final     ALT 09/24/2022 56 (A) 10 - 35 U/L Final     Protein Total 09/24/2022 8.4 (A) 6.4 - 8.3 g/dL Final     Albumin 09/24/2022 4.3  3.5 - 5.2 g/dL Final     Bilirubin Total 09/24/2022 <0.2  <=1.2 mg/dL Final     GFR Estimate 09/24/2022 >90  >60 mL/min/1.73m2 Final    Effective December 21, 2021 eGFRcr in adults is calculated using the 2021 CKD-EPI creatinine equation which includes age and gender (Nellie et al., NEJM, DOI: 10.1056/TTQHaq2686398)     GLUCOSE BY METER POCT 09/24/2022 140 (A) 70 - 99 mg/dL Final     GLUCOSE BY METER POCT 09/24/2022 126 (A) 70 - 99 mg/dL Final       ----- Service Performed and Documented by Resident or Fellow ------     Precepted with Dr. Scott Lyman DO

## 2022-09-28 NOTE — PROGRESS NOTES
Preceptor Attestation:    I discussed the patient with the resident and evaluated the patient in person. I have verified the content of the note, which accurately reflects my assessment of the patient and the plan of care.   Supervising Physician:  Scott Palomo MD.

## 2022-09-28 NOTE — PROGRESS NOTES
"Clinic Care Coordination Contact  [facility]: Post-Discharge Note  SITUATION                                                      Admission:    Admission Date: 09/21/22   Reason for Admission: Pyelonephritis, acute  (N20.0) Staghorn calculus  (A41.9) Sepsis without acute organ dysfunction, due to unspecified organism  Discharge:   Discharge Date: 09/24/22  Discharge Diagnosis: Pyelonephritis, acute  (N20.0) Staghorn calculus  (A41.9) Sepsis without acute organ dysfunction, due to unspecified organism    BACKGROUND                                                      Per hospital discharge summary and inpatient provider notes:  Lizzy Wright was admitted on 9/21/2022 for pyelonephritis with right-sided staghorn calculi.  The following problems were addressed during her hospitalization:     Patient admitted with 2 weeks of back pain, chills and fever found to have complicated UTI in the setting of a staghorn calculi in right renal pelvis.  Case was discussed with urology and given location of the stone there was no intervention recommended, patient was treated with IV ceftriaxone.  She was discharged with oral antibiotic regimen and plan to follow-up in about 3 weeks with urology to remove stone.       ASSESSMENT           Discharge Assessment  How are you doing now that you are home?: \" Doing well\"  How are your symptoms? (Red Flag symptoms escalate to triage hotline per guidelines): Improved  Do you feel your condition is stable enough to be safe at home until your provider visit?: Yes  Does the patient have their discharge instructions? : Yes  Does the patient have questions regarding their discharge instructions? : No  Were you started on any new medications or were there changes to any of your previous medications? : Yes  Does the patient have all of their medications?: No (see comment)  Do you have questions regarding any of your medications? : No  Do you have all of your needed medical supplies or equipment " (DME)?  (i.e. oxygen tank, CPAP, cane, etc.): Yes  Discharge follow-up appointment scheduled within 14 calendar days? : Yes  Discharge Follow Up Appointment Date: 09/28/22  Discharge Follow Up Appointment Scheduled with?: Specialty Care Provider ( called while Patient was in the [facility] today with  so all questions or concerns answered.)    Post-op ([facility] CTA Only)  If the patient had a surgery or procedure, do they have any questions for a nurse?: No             PLAN                                                      Outpatient Plan:    Follow up with primary care provider, Sona Bran, within 7 days for hospital follow- up.  No follow up labs or test are needed.          Activity     Your activity upon discharge: activity as tolerated          Diet     Follow this diet upon discharge: Orders Placed This Encounter      Moderate Consistent Carb (60 g CHO per Meal) Diet        No future appointments.      For any urgent concerns, please contact our 24 hour nurse triage line: [phone])         Belkis Martins MA

## 2022-09-28 NOTE — PATIENT INSTRUCTIONS
-Thank you for coming to the clinic today  -Please pick take antibiotic as prescribed until 2 days after urology procedure.  -Follow-up with urology in approximately 3 weeks.   -Return to clinic in approximately 4 weeks or one week after urology procedure for recheck and to check on liver function.   -Covid vaccine and flu immunization today

## 2022-09-28 NOTE — Clinical Note
Hi Mariel,  Can you help to make sure this patient is scheduled for the recommended urology appointment following her hospitalization? I would also like to see her in clinic following this urology appointment. Thanks for your help with this.   Kind regards,  Albert Lyman, DO

## 2022-09-28 NOTE — Clinical Note
Epic suggested I change the level of service to a [id number] for this encounter. IN transitional care manage service 7 day discharge. I re-forwarded you the chart to sign with the change if you feel the coding is correct.  -Robbi Lyman

## 2022-09-30 ENCOUNTER — TELEPHONE (OUTPATIENT)
Dept: UROLOGY | Facility: CLINIC | Age: 42
End: 2022-09-30

## 2022-09-30 NOTE — TELEPHONE ENCOUNTER
" Health Call Center    Phone Message    May a detailed message be left on voicemail: yes     Reason for Call: Pt was released from ER, sent home with antibiotics and reported to have UTI and stones. Action was to \"follow up in 3 weeks with urology to remove stone.\" Please call Pt to set up appt. Thank you    Action Taken: Message routed to:  [facility] ([facility] Uro    Travel Screening: Not Applicable                                                                        "

## 2022-10-04 NOTE — TELEPHONE ENCOUNTER
Pt called and scheduled for a video visit with Dr. Yuan 10/11/22. Pt expressed understanding that this is a consult and surgery will be scheduled soon after.    Ara Lee CMA  10/04/22  11:14 AM

## 2022-10-04 NOTE — TELEPHONE ENCOUNTER
MEDICAL RECORDS REQUEST   [facility]  Urology Clinic  [address] [place] [address]  PHONE: [phone]  Fax: [phone]        FUTURE VISIT INFORMATION                                                   Ball KEDAR Wright, : 1980 scheduled for future visit at [facility]    APPOINTMENT INFORMATION:    Date: 10/81/2022    Provider:  Blanca Yuan MD    Reason for Visit/Diagnosis: Stone consult    RECORDS REQUESTED FOR VISIT                                                     NOTES  STATUS/DETAILS   OFFICE NOTE from other specialist  yes, 2022 -- Albert Lyman DO @ [facility]     DISCHARGE REPORT from the ER  yes, 2022 -- Td Khan MD @ [facility]    MEDICATION LIST  yes   LABS     URINALYSIS (UA)  yes   IMAGING (IMAGES & REPORT)  yes, 2022 -- CT ABD PELVIS     PRE-VISIT CHECKLIST      Record collection complete Yes   Appointment appropriately scheduled           (right time/right provider) Yes   Joint diagnostic appointment coordinated correctly          (ensure right order & amount of time) Yes   MyChart activation If no, please explain pending   Questionnaire complete If no, please explain pending

## 2022-10-11 ENCOUNTER — TELEPHONE (OUTPATIENT)
Dept: SURGERY | Facility: CLINIC | Age: 42
End: 2022-10-11

## 2022-10-11 ENCOUNTER — PRE VISIT (OUTPATIENT)
Dept: UROLOGY | Facility: CLINIC | Age: 42
End: 2022-10-11

## 2022-10-11 NOTE — TELEPHONE ENCOUNTER
I tried calling the mobile and home number - mobile number no one answered and VM was not set up    The home number someone picked up but did not speak english well, they relayed to me that she is not home    I tried to call back with Tabatha  but at that point that number went to voicemail and so left voice mail with clinic call back number    Will reschedule

## 2022-10-11 NOTE — TELEPHONE ENCOUNTER
Messages left with phone numbers asking pt to call back to schedule visit.    Ara Lee CMA  10/11/22  3:15 PM

## 2022-10-12 NOTE — TELEPHONE ENCOUNTER
Spoke with pt and she works at 2pm, told pt I would call back after speaking with nurse. Scheduled pt for VV 10/18 1pm and tried calling pt back but she must be at work. Will call pt back tomorrow before 2pm.

## 2022-10-13 NOTE — TELEPHONE ENCOUNTER
Called patient with Tabatha Wilson and made she she was aware of video visit next week. All questions were answered

## 2022-10-17 NOTE — PROGRESS NOTES
Lizzy is a 41 year old who is being evaluated via a billable video visit.      How would you like to obtain your AVS? Mail a copy  If the video visit is dropped, the invitation should be resent by: Text to cell phone: [phone]  Will anyone else be joining your video visit? No        Video-Visit Details    Video Start Time: 1:10 pm    Type of service:  Video Visit    Video End Time:1:30    Originating Location (pt. Location): Home        Distant Location (provider location):  On-site    Platform used for Video Visit: [facility]      UROLOGY OUTPATIENT VISIT      Chief Complaint:   Staghorn calculus right kidney      Synopsis   Lizzy Wright is a very pleasant AGE: 41 year old year old person with diabetes    She has a sister with history of stone who needed surgery, ureteroscopy  No history of recurrent UTI  No history of prior stones  Hx diabetes  No other medical problems,  No bowel surgeries  Currently feels well   No fevers, chills or flank pain      Problem   Staghorn Calculus    Presented to ER with UTI, found to have below    Imaging  9/21/22 I personally reviewed the CT scan and by my interpretation it demonstrates full staghorn calculus on the right kidney. Left kidney is normal.     Urine cultures with >100k CFU mixed urogenital kaveh  UA with positive nitrite, many bacteria  Blood cultures with positive E Coli resistant to ampicillin         The following distinct labs were reviewed   Most Recent 3 CBC's:Recent Labs   Lab Test 09/24/22  0525 09/23/22  0553 09/22/22  0711   WBC 5.3 5.7 7.0   HGB 11.3* 9.4* 9.6*   MCV 83 84 83    199 190     Most Recent 3 BMP's:Recent Labs   Lab Test 09/24/22  1148 09/24/22  0800 09/24/22  0525 09/23/22  2111 09/23/22  1912 09/23/22  0821 09/23/22  0553 09/22/22  0826 09/22/22  0711   NA  --   --  138  --   --   --  140  --  139   POTASSIUM  --   --  3.9  --  4.1  --  3.4   < > 3.3*   CHLORIDE  --   --  105  --   --   --  108*  --  106   CO2  --   --  22  --   --   --  22   --  25   BUN  --   --  12.4  --   --   --  5.4*  --  5.4*   CR  --   --  0.49*  --   --   --  0.46*  --  0.57   ANIONGAP  --   --  11  --   --   --  10  --  8   SABINE  --   --  9.5  --   --   --  8.7  --  8.3*   * 140* 106*   < >  --    < > 120*   < > 120*    < > = values in this interval not displayed.     Most Recent Urinalysis:Recent Labs   Lab Test 22  1345   COLOR Yellow   APPEARANCE Turbid*   URINEGLC Negative   URINEBILI Negative   URINEKETONE 40*   SG 1.016   UBLD >1.0 mg/dL*   URINEPH 7.5*   PROTEIN 200*   NITRITE Positive*   LEUKEST 500 Jessie/uL*   RBCU 56*   WBCU >182*       Medical Comorbidities      Past Medical History:   Diagnosis Date     Anemia      Diabetes (H)      Diabetes mellitus, type II (H)      H/O gestational diabetes mellitus, not currently pregnant 2013    Pt was started on insulin by HE DM educator on 10/3/13.  She is on NPH 12 units at bedtime and Humalog 1 unit per carb with meals.  She will NEED weekly BPP/NST until delivers at [facility]./      H/O seasonal allergies      History of  2013    First c-sect was done in  in [facility] and so she has unknown scar on uterus.  She had repeat c-sect  by Dr Pineda (Jasmin LIPSCOMB) at [facility] and 13 at [facility] (had tubal ligation)      History of insulin dependent diabetes mellitus 2018             Medications     Current Outpatient Medications   Medication     acetaminophen (TYLENOL) 325 MG tablet     blood glucose (NO BRAND SPECIFIED) test strip     blood glucose monitoring (NO BRAND SPECIFIED) meter device kit     cefuroxime (CEFTIN) 500 MG tablet     cefuroxime (CEFTIN) 500 MG tablet     dapagliflozin (FARXIGA) 5 MG TABS tablet     ferrous sulfate (FEROSUL) 325 (65 Fe) MG tablet     metFORMIN (GLUCOPHAGE XR) 500 MG 24 hr tablet     senna-docusate (SENOKOT-S/PERICOLACE) 8.6-50 MG tablet     thin (NO BRAND SPECIFIED) lancets     No current facility-administered medications for this visit.             Assessment/Plan   41 year old year old person with diabetes and right staghorn calculus    1. Right Staghorn Calculus - I discussed and reviewed the imaging findings with the patient. Discussed that this was likely source of her bacteremia. Currently asymptomatic. I discussed that she has a large staghorn calculus and would recommend treatment as without treatment could lead to recurrent infections and damage to the kidney. I discussed need for percutaneous removal of the stone with PCNL. We discussed that this involves a one night hospital stay and given the size of stone difficult to remove all in one setting. Discussed risks of bleeding and transfusion 2-5% and risk of sepsis about 5%.     Will plan for right PCNL in the next few weeks    CC:  Sona Bran

## 2022-10-18 ENCOUNTER — VIRTUAL VISIT (OUTPATIENT)
Dept: UROLOGY | Facility: CLINIC | Age: 42
End: 2022-10-18
Payer: COMMERCIAL

## 2022-10-18 DIAGNOSIS — N20.0 STAGHORN CALCULUS: ICD-10-CM

## 2022-10-18 DIAGNOSIS — N20.0 KIDNEY STONE: Primary | ICD-10-CM

## 2022-10-18 PROCEDURE — 99204 OFFICE O/P NEW MOD 45 MIN: CPT | Mod: 95 | Performed by: STUDENT IN AN ORGANIZED HEALTH CARE EDUCATION/TRAINING PROGRAM

## 2022-10-18 NOTE — LETTER
10/18/2022       RE: Lizzy Wright  [address] [place] [address]     Dear Colleague,    Thank you for referring your patient, Lizzy Wright, to the [facility]. Please see a copy of my visit note below.    Lizzy is a 41 year old who is being evaluated via a billable video visit.      How would you like to obtain your AVS? Mail a copy  If the video visit is dropped, the invitation should be resent by: Text to cell phone: [phone]  Will anyone else be joining your video visit? No        Video-Visit Details    Video Start Time: 1:10 pm    Type of service:  Video Visit    Video End Time:1:30    Originating Location (pt. Location): Home        Distant Location (provider location):  On-site    Platform used for Video Visit: [facility]      UROLOGY OUTPATIENT VISIT      Chief Complaint:   Staghorn calculus right kidney      Synopsis   Lizzy Wright is a very pleasant AGE: 41 year old year old person with diabetes    She has a sister with history of stone who needed surgery, ureteroscopy  No history of recurrent UTI  No history of prior stones  Hx diabetes  No other medical problems,  No bowel surgeries  Currently feels well   No fevers, chills or flank pain      Problem   Staghorn Calculus    Presented to ER with UTI, found to have below    Imaging  9/21/22 I personally reviewed the CT scan and by my interpretation it demonstrates full staghorn calculus on the right kidney. Left kidney is normal.     Urine cultures with >100k CFU mixed urogenital kaveh  UA with positive nitrite, many bacteria  Blood cultures with positive E Coli resistant to ampicillin         The following distinct labs were reviewed   Most Recent 3 CBC's:Recent Labs   Lab Test 09/24/22  0525 09/23/22  0553 09/22/22  0711   WBC 5.3 5.7 7.0   HGB 11.3* 9.4* 9.6*   MCV 83 84 83    199 190     Most Recent 3 BMP's:Recent Labs   Lab Test 09/24/22  1148  22  0800 22  0525 22  2111 22  1912 22  0821 22  0553 22  0826 22  0711   NA  --   --  138  --   --   --  140  --  139   POTASSIUM  --   --  3.9  --  4.1  --  3.4   < > 3.3*   CHLORIDE  --   --  105  --   --   --  108*  --  106   CO2  --   --  22  --   --   --  22  --  25   BUN  --   --  12.4  --   --   --  5.4*  --  5.4*   CR  --   --  0.49*  --   --   --  0.46*  --  0.57   ANIONGAP  --   --  11  --   --   --  10  --  8   SABINE  --   --  9.5  --   --   --  8.7  --  8.3*   * 140* 106*   < >  --    < > 120*   < > 120*    < > = values in this interval not displayed.     Most Recent Urinalysis:Recent Labs   Lab Test 22  1345   COLOR Yellow   APPEARANCE Turbid*   URINEGLC Negative   URINEBILI Negative   URINEKETONE 40*   SG 1.016   UBLD >1.0 mg/dL*   URINEPH 7.5*   PROTEIN 200*   NITRITE Positive*   LEUKEST 500 Jessie/uL*   RBCU 56*   WBCU >182*       Medical Comorbidities      Past Medical History:   Diagnosis Date     Anemia      Diabetes (H)      Diabetes mellitus, type II (H)      H/O gestational diabetes mellitus, not currently pregnant 2013    Pt was started on insulin by HE DM educator on 10/3/13.  She is on NPH 12 units at bedtime and Humalog 1 unit per carb with meals.  She will NEED weekly BPP/NST until delivers at [facility]      H/O seasonal allergies      History of  2013    First c-sect was done in  in [facility] and so she has unknown scar on uterus.  She had repeat c-sect  by Dr Pineda (Jasmin LIPSCOMB) at [facility] and 13 at [facility] (had tubal ligation)      History of insulin dependent diabetes mellitus 2018             Medications     Current Outpatient Medications   Medication     acetaminophen (TYLENOL) 325 MG tablet     blood glucose (NO BRAND SPECIFIED) test strip     blood glucose monitoring (NO BRAND SPECIFIED) meter device kit     cefuroxime (CEFTIN) 500 MG tablet     cefuroxime (CEFTIN) 500 MG tablet      dapagliflozin (FARXIGA) 5 MG TABS tablet     ferrous sulfate (FEROSUL) 325 (65 Fe) MG tablet     metFORMIN (GLUCOPHAGE XR) 500 MG 24 hr tablet     senna-docusate (SENOKOT-S/PERICOLACE) 8.6-50 MG tablet     thin (NO BRAND SPECIFIED) lancets     No current facility-administered medications for this visit.            Assessment/Plan   41 year old year old person with diabetes and right staghorn calculus    1. Right Staghorn Calculus - I discussed and reviewed the imaging findings with the patient. Discussed that this was likely source of her bacteremia. Currently asymptomatic. I discussed that she has a large staghorn calculus and would recommend treatment as without treatment could lead to recurrent infections and damage to the kidney. I discussed need for percutaneous removal of the stone with PCNL. We discussed that this involves a one night hospital stay and given the size of stone difficult to remove all in one setting. Discussed risks of bleeding and transfusion 2-5% and risk of sepsis about 5%.     Will plan for right PCNL in the next few weeks    CC:  Sona Bran    Sincerely,    Blanca Yuan MD

## 2022-10-25 ENCOUNTER — TELEPHONE (OUTPATIENT)
Dept: UROLOGY | Facility: CLINIC | Age: 42
End: 2022-10-25

## 2022-10-27 ENCOUNTER — TELEPHONE (OUTPATIENT)
Dept: UROLOGY | Facility: CLINIC | Age: 42
End: 2022-10-27

## 2022-10-27 DIAGNOSIS — N20.0 STAGHORN CALCULUS: Primary | ICD-10-CM

## 2022-10-27 NOTE — TELEPHONE ENCOUNTER
Spoke with: Patient through the language line     Date of surgery: Monday Nov 14 2022       Location: [place]     Informed patient they will need a adult : Yes      Pre op with provider: Patient said she will set up with her PCP at the [facility]       H&P Scheduled in PAC- Na         Pre procedure covid : Patient scheduled for a PCR Covid test on 11/11/22 at the [facility] lab       Additional imaging: NA        Surgery Packet : Forward to Lilia PANG      Additional comments:Please call patient with surgery teaching

## 2022-11-03 ENCOUNTER — TELEPHONE (OUTPATIENT)
Dept: UROLOGY | Facility: CLINIC | Age: 42
End: 2022-11-03

## 2022-11-03 DIAGNOSIS — N20.0 STAGHORN CALCULUS: Primary | ICD-10-CM

## 2022-11-03 NOTE — TELEPHONE ENCOUNTER
Procedure: PCNL    Date: 11/14/22  Provider: Anish     Post op appt: [facility]    H&P: Pt to call her primary   UA/UC: orders placed - pt notified she will need a urine culture  COVID test: scheduled 11/11/22    Medications: pt will review her medications at her pre-op, instructed to bring them with her to her appointment  Soap: soap mailed with instructions - spencer Mays  Reviewed when to start clear liquids and when to start NPO: reviewed  : daughter  24 hour observation: overnight admission     Pt or family member expressed understanding: yes    Ara Lee CMA  11/3/2022  8:33 AM       ----- Message from Teresa Freedman RN sent at 11/2/2022  3:09 PM CDT -----  Regarding: FW: 11/14 omkar  Hi  Right PCNL  Overnight stay  Need pcr      Thanks!  ----- Message -----  From: Teresa Freedman RN  Sent: 10/27/2022   2:58 PM CDT  To: Teresa Freedman RN  Subject: 11/14 omkar

## 2022-11-07 ENCOUNTER — HOSPITAL ENCOUNTER (INPATIENT)
Facility: CLINIC | Age: 42
Setting detail: SURGERY ADMIT
End: 2022-11-07
Attending: UROLOGY | Admitting: UROLOGY
Payer: COMMERCIAL

## 2022-11-07 DIAGNOSIS — R80.9 DIABETES MELLITUS WITH PROTEINURIA (H): Primary | ICD-10-CM

## 2022-11-07 DIAGNOSIS — E11.29 DIABETES MELLITUS WITH PROTEINURIA (H): Primary | ICD-10-CM

## 2022-11-07 DIAGNOSIS — N18.1 CKD STAGE G1/A3, GFR > 90 AND ALBUMIN CREATININE RATIO >300 MG/G: ICD-10-CM

## 2022-11-08 ENCOUNTER — OFFICE VISIT (OUTPATIENT)
Dept: FAMILY MEDICINE | Facility: CLINIC | Age: 42
End: 2022-11-08
Payer: COMMERCIAL

## 2022-11-08 VITALS
OXYGEN SATURATION: 98 % | WEIGHT: 139.8 LBS | DIASTOLIC BLOOD PRESSURE: 84 MMHG | HEIGHT: 61 IN | TEMPERATURE: 97.9 F | BODY MASS INDEX: 26.39 KG/M2 | RESPIRATION RATE: 20 BRPM | HEART RATE: 100 BPM | SYSTOLIC BLOOD PRESSURE: 122 MMHG

## 2022-11-08 DIAGNOSIS — R80.9 DIABETES MELLITUS WITH PROTEINURIA (H): Primary | ICD-10-CM

## 2022-11-08 DIAGNOSIS — Z01.818 PREOP GENERAL PHYSICAL EXAM: ICD-10-CM

## 2022-11-08 DIAGNOSIS — Z11.59 NEED FOR HEPATITIS C SCREENING TEST: ICD-10-CM

## 2022-11-08 DIAGNOSIS — N20.0 STAGHORN CALCULUS: ICD-10-CM

## 2022-11-08 DIAGNOSIS — N18.1 CKD STAGE G1/A3, GFR > 90 AND ALBUMIN CREATININE RATIO >300 MG/G: ICD-10-CM

## 2022-11-08 DIAGNOSIS — E11.29 DIABETES MELLITUS WITH PROTEINURIA (H): Primary | ICD-10-CM

## 2022-11-08 LAB
ALBUMIN SERPL BCG-MCNC: 5 G/DL (ref 3.5–5.2)
ALP SERPL-CCNC: 74 U/L (ref 35–104)
ALT SERPL W P-5'-P-CCNC: 29 U/L (ref 10–35)
ANION GAP SERPL CALCULATED.3IONS-SCNC: 15 MMOL/L (ref 7–15)
AST SERPL W P-5'-P-CCNC: 24 U/L (ref 10–35)
BILIRUB SERPL-MCNC: 1 MG/DL
BUN SERPL-MCNC: 9.2 MG/DL (ref 6–20)
CALCIUM SERPL-MCNC: 9.6 MG/DL (ref 8.6–10)
CHLORIDE SERPL-SCNC: 102 MMOL/L (ref 98–107)
CHOLEST SERPL-MCNC: 220 MG/DL
CREAT SERPL-MCNC: 0.52 MG/DL (ref 0.51–0.95)
DEPRECATED HCO3 PLAS-SCNC: 22 MMOL/L (ref 22–29)
ERYTHROCYTE [DISTWIDTH] IN BLOOD BY AUTOMATED COUNT: 14.7 % (ref 10–15)
GFR SERPL CREATININE-BSD FRML MDRD: >90 ML/MIN/1.73M2
GLUCOSE SERPL-MCNC: 172 MG/DL (ref 70–99)
HCT VFR BLD AUTO: 37 % (ref 35–47)
HDLC SERPL-MCNC: 42 MG/DL
HGB BLD-MCNC: 12.1 G/DL (ref 11.7–15.7)
LDLC SERPL CALC-MCNC: 153 MG/DL
MCH RBC QN AUTO: 26.2 PG (ref 26.5–33)
MCHC RBC AUTO-ENTMCNC: 32.7 G/DL (ref 31.5–36.5)
MCV RBC AUTO: 80 FL (ref 78–100)
NONHDLC SERPL-MCNC: 178 MG/DL
PLATELET # BLD AUTO: 293 10E3/UL (ref 150–450)
POTASSIUM SERPL-SCNC: 3.5 MMOL/L (ref 3.4–5.3)
PROT SERPL-MCNC: 8.6 G/DL (ref 6.4–8.3)
RBC # BLD AUTO: 4.62 10E6/UL (ref 3.8–5.2)
SODIUM SERPL-SCNC: 139 MMOL/L (ref 136–145)
TRIGL SERPL-MCNC: 126 MG/DL
WBC # BLD AUTO: 8.3 10E3/UL (ref 4–11)

## 2022-11-08 PROCEDURE — 36415 COLL VENOUS BLD VENIPUNCTURE: CPT | Performed by: FAMILY MEDICINE

## 2022-11-08 PROCEDURE — 86803 HEPATITIS C AB TEST: CPT | Performed by: FAMILY MEDICINE

## 2022-11-08 PROCEDURE — 99214 OFFICE O/P EST MOD 30 MIN: CPT | Performed by: FAMILY MEDICINE

## 2022-11-08 PROCEDURE — 80053 COMPREHEN METABOLIC PANEL: CPT | Performed by: FAMILY MEDICINE

## 2022-11-08 PROCEDURE — 80061 LIPID PANEL: CPT | Performed by: FAMILY MEDICINE

## 2022-11-08 PROCEDURE — 85027 COMPLETE CBC AUTOMATED: CPT | Performed by: FAMILY MEDICINE

## 2022-11-08 NOTE — PATIENT INSTRUCTIONS
Preparing for Your Surgery  Getting started  A nurse will call you to review your health history and instructions. They will give you an arrival time based on your scheduled surgery time. Please be ready to share:    Your doctor s clinic name and phone number    Your medical, surgical, and anesthesia history    A list of allergies and sensitivities    A list of medicines, including herbal treatments and over-the-counter drugs    Whether the patient has a legal guardian (ask how to send us the papers in advance)  Please tell us if you re pregnant--or if there s any chance you might be pregnant. Some surgeries may injure a fetus (unborn baby), so they require a pregnancy test. Surgeries that are safe for a fetus don t always need a test, and you can choose whether to have one.   If you have a child who s having surgery, please ask for a copy of Preparing for Your Child s Surgery.    Preparing for surgery    Within 10 to 30 days of surgery: Have a pre-op exam (sometimes called an H&P, or History and Physical). This can be done at a clinic or pre-operative center.  ? If you re having a , you may not need this exam. Talk to your care team.    At your pre-op exam, talk to your care team about all medicines you take. If you need to stop any medicines before surgery, ask when to start taking them again.  ? We do this for your safety. Many medicines can make you bleed too much during surgery. Some change how well surgery (anesthesia) drugs work.    Call your insurance company to let them know you re having surgery. (If you don t have insurance, call [phone].)    Call your clinic if there s any change in your health. This includes signs of a cold or flu (sore throat, runny nose, cough, rash, fever). It also includes a scrape or scratch near the surgery site.    If you have questions on the day of surgery, call your hospital or surgery center.  COVID testing  You may need to be tested for COVID-19 before having  surgery. If so, we will give you instructions (or click here).  Eating and drinking guidelines  For your safety: Unless your surgeon tells you otherwise, follow the guidelines below.    Eat and drink as usual until 8 hours before you arrive for surgery. After that, no food or milk.    Drink clear liquids until 2 hours before you arrive. These are liquids you can see through, like water, Gatorade, and Propel Water. They also include plain black coffee and tea (no cream or milk), candy, and breath mints. You can spit out gum when you arrive.    If you drink alcohol: Stop drinking it the night before surgery.    If your care team tells you to take medicine on the morning of surgery, it s okay to take it with a sip of water.  Preventing infection    Shower or bathe the night before and morning of your surgery. Follow the instructions your clinic gave you. (If no instructions, use regular soap.)    Don t shave or clip hair near your surgery site. We ll remove the hair if needed.    Don t smoke or vape the morning of surgery. You may chew nicotine gum up to 2 hours before surgery. A nicotine patch is okay.  ? Note: Some surgeries require you to completely quit smoking and nicotine. Check with your surgeon.    Your care team will make every effort to keep you safe from infection. We will:  ? Clean our hands often with soap and water (or an alcohol-based hand rub).  ? Clean the skin at your surgery site with a special soap that kills germs.  ? Give you a special gown to keep you warm. (Cold raises the risk of infection.)  ? Wear special hair covers, masks, gowns and gloves during surgery.  ? Give antibiotic medicine, if prescribed. Not all surgeries need antibiotics.  What to bring on the day of surgery    Photo ID and insurance card    Copy of your health care directive, if you have one    Glasses and hearing aids (bring cases)  ? You can t wear contacts during surgery    Inhaler and eye drops, if you use them (tell us  about these when you arrive)    CPAP machine or breathing device, if you use them    A few personal items, if spending the night    If you have . . .  ? A pacemaker, ICD (cardiac defibrillator) or other implant: Bring the ID card.  ? An implanted stimulator: Bring the remote control.  ? A legal guardian: Bring a copy of the certified (court-stamped) guardianship papers.  Please remove any jewelry, including body piercings. Leave jewelry and other valuables at home.  If you re going home the day of surgery    You must have a responsible adult drive you home. They should stay with you overnight as well.    If you don t have someone to stay with you, and you aren t safe to go home alone, we may keep you overnight. Insurance often won t pay for this.  After surgery  If it s hard to control your pain or you need more pain medicine, please call your surgeon s office.  Questions?   If you have any questions for your care team, list them here:   ____________________________________________________________________________________________________________________________________________________________________________________________________________________________________________________________________  For informational purposes only. Not to replace the advice of your health care provider. Copyright   2003, 2019 [employer]. All rights reserved. Clinically reviewed by Sanaz Martinez MD. [employer] 130461 - REV 10/22.

## 2022-11-08 NOTE — PROGRESS NOTES
[facility] [address] [place] [address]  Phone: [phone]  Fax: [phone]  Primary Provider: Sona Bran  Pre-op Performing Provider: BRIA PARRY      PREOPERATIVE EVALUATION:  Today's date: 11/8/2022    Lizzy Wright is a 42 year old female who presents for a preoperative evaluation.    Surgical Information:  Surgery/Procedure: Staghorn calculus in the right renal pelvis.  Surgery Location: pt unsure, not in [facility]  Surgeon: Anish (per [facility], pt unsure)  Surgery Date: 11/15/22 (Epic says 11/16/22)  Time of Surgery:  pt unsure, not in [facility]  Where patient plans to recover: At home with family.  Surgeon anticipates an overnight stay in the hospital.  Fax number for surgical facility:  pt unsure, not in Epic    Type of Anesthesia Anticipated: General    She has had a previous tubal ligation and still has a normal menstrual cycle.    Assessment & Plan     The proposed surgical procedure is considered MODERATE risk.    CKD stage G1/A3, GFR > 90 and albumin creatinine ratio >300 mg/g    - Comprehensive metabolic panel  - CBC with platelets           Risks and Recommendations:  The patient has the following additional risks and recommendations for perioperative complications:  Diabetes:  - Patient is not on insulin therapy: regular NPO guidelines can be followed.     Medication Instructions:  Patient is to take all scheduled medications on the day of surgery    RECOMMENDATION:  APPROVAL GIVEN to proceed with proposed procedure, without further diagnostic evaluation.    Review of the result(s) of each unique test - as above      Diagnosis or treatment significantly limited by social determinants of health - language barrier and low health literacy    30 minutes spent on the date of the encounter doing chart review, patient visit and documentation         Subjective     HPI related to upcoming procedure: Found to have a staghorn calculus.  Currently feeling fine.    Preop  Questions 11/8/2022   1. Have you ever had a heart attack or stroke? No   2. Have you ever had surgery on your heart or blood vessels, such as a stent placement, a coronary artery bypass, or surgery on an artery in your head, neck, heart, or legs? No   3. Do you have chest pain with activity? No   4. Do you have a history of  heart failure? No   5. Do you currently have a cold, bronchitis or symptoms of other infection? No   6. Do you have a cough, shortness of breath, or wheezing? No   7. Do you or anyone in your family have previous history of blood clots? No   8. Do you or does anyone in your family have a serious bleeding problem such as prolonged bleeding following surgeries or cuts? No   9. Have you ever had problems with anemia or been told to take iron pills? YES - On PO Fe    10. Have you had any abnormal blood loss such as black, tarry or bloody stools, or abnormal vaginal bleeding? No   11. Have you ever had a blood transfusion? YES - For iron def anemia   11a. Have you ever had a transfusion reaction? No   12. Are you willing to have a blood transfusion if it is medically needed before, during, or after your surgery? Yes   13. Have you or any of your relatives ever had problems with anesthesia? No   14. Do you have sleep apnea, excessive snoring or daytime drowsiness? No   15. Do you have any artifical heart valves or other implanted medical devices like a pacemaker, defibrillator, or continuous glucose monitor? No   16. Do you have artificial joints? No   17. Are you allergic to latex? No   18. Is there any chance that you may be pregnant? No       Health Care Directive:  Patient does not have a Health Care Directive or Living Will: Discussed advance care planning with patient; however, patient declined at this time.    Preoperative Review of :   reviewed - no record of controlled substances prescribed.      Status of Chronic Conditions:  DIABETES - Patient has a longstanding history of  DiabetesType Type II . Patient is being treated with oral agents and denies significant side effects. Control has been good. Complicating factors include but are not limited to: proteinuria.. A1c was 8.1 on 9/21/22.  Pt is not on a statin but just turned 42 2 weeks ago.      Review of Systems  CONSTITUTIONAL: NEGATIVE for fever, chills, change in weight  ENT/MOUTH: NEGATIVE for ear, mouth and throat problems  RESP: NEGATIVE for significant cough or SOB  CV: NEGATIVE for chest pain, palpitations or peripheral edema    Patient Active Problem List    Diagnosis Date Noted     Pyelonephritis, acute 09/21/2022     Priority: Medium     Staghorn calculus (right) 09/21/2022     Priority: Medium     Presented to ER with UTI, found to have below    Imaging  9/21/22 I personally reviewed the CT scan and by my interpretation it demonstrates full staghorn calculus on the right kidney. Left kidney is normal.     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   5/2020: Low ferritin  6/2018: Hemoglobinopathy/thalassemia cascade was negative however pathologist recommended repeating once iron stores had been replenished. No personal or family history of any bleeding disorders or other genetic issues. She does have a history of a vegetarian diet.          Perforation of tympanic membrane, left 06/20/2018     Priority: Medium     Diabetes mellitus with proteinuria (H) 12/08/2014     Priority: Medium     Hx of GDM. Diagnosed with type 2 diabetes in 2014. Has received treatment inconsistently since diagnosis due to insurance issues.         Past Medical History:   Diagnosis Date     Anemia       Diabetes (H)      Diabetes mellitus, type II (H)      H/O gestational diabetes mellitus, not currently pregnant 2013    Pt was started on insulin by HE DM educator on 10/3/13.  She is on NPH 12 units at bedtime and Humalog 1 unit per carb with meals.  She will NEED weekly BPP/NST until delivers at [facility]      H/O seasonal allergies      History of  2013    First c-sect was done in  in [facility] and so she has unknown scar on uterus.  She had repeat c-sect  by Dr Pineda (Jasmin OBNIGEL) at [facility] and 13 at [facility] (had tubal ligation)      History of insulin dependent diabetes mellitus 2018     Past Surgical History:   Procedure Laterality Date      SECTION  2008 and       SECTION       TUBAL LIGATION       TUBAL LIGATION       Current Outpatient Medications   Medication Sig Dispense Refill     senna-docusate (SENOKOT-S/PERICOLACE) 8.6-50 MG tablet Take 1 tablet by mouth daily 90 tablet 3     acetaminophen (TYLENOL) 325 MG tablet Take 1-2 tablets (325-650 mg) by mouth every 6 hours as needed for mild pain 100 tablet 1     blood glucose (NO BRAND SPECIFIED) test strip Use to test blood sugar 1 times daily or as directed. To accompany: Blood Glucose Monitor Brands: per insurance. 100 strip 3     blood glucose monitoring (NO BRAND SPECIFIED) meter device kit Use to test blood sugar 1 times daily or as directed. Preferred blood glucose meter OR supplies to accompany: Blood Glucose Monitor Brands: per insurance. 1 kit 0     cefuroxime (CEFTIN) 500 MG tablet Take 1 tablet (500 mg) by mouth 2 times daily 30 tablet 1     cefuroxime (CEFTIN) 500 MG tablet Take 1 tablet (500 mg) by mouth 2 times daily 42 tablet 0     dapagliflozin (FARXIGA) 5 MG TABS tablet Take 1 tablet (5 mg) by mouth daily 90 tablet 3     ferrous sulfate (FEROSUL) 325 (65 Fe) MG tablet Take 1 tablet (325 mg) by mouth 2 times daily (with meals) 180 tablet 3     metFORMIN (GLUCOPHAGE XR)  "500 MG 24 hr tablet Take 1 tablet (500 mg) by mouth daily (with dinner) for 7 days, THEN 2 tablets (1,000 mg) daily (with dinner) for 7 days, THEN 3 tablets (1,500 mg) daily (with dinner) for 7 days, THEN 4 tablets (2,000 mg) daily (with dinner) for 99 days. (Patient taking differently: Take 2 tablets daily with dinner) 360 tablet 3     thin (NO BRAND SPECIFIED) lancets Use with lanceting device. Use to test 1 times per day. To accompany: Blood Glucose Monitor Brands: per insurance. 100 each 3       Allergies   Allergen Reactions     Nkda [No Known Drug Allergies]         Social History     Tobacco Use     Smoking status: Never     Smokeless tobacco: Never   Substance Use Topics     Alcohol use: No     Family History   Problem Relation Age of Onset     Family History Negative Other      Diabetes No family hx of      Coronary Artery Disease No family hx of      Cancer No family hx of      Heart Disease No family hx of      History   Drug Use No         Objective     /84   Pulse 100   Temp 97.9  F (36.6  C) (Oral)   Resp 20   Ht 1.549 m (5' 1\")   Wt 63.4 kg (139 lb 12.8 oz)   LMP 10/20/2022 (Exact Date)   SpO2 98%   BMI 26.41 kg/m      Physical Exam  GENERAL APPEARANCE: healthy, alert and no distress  HENT: ear canals and TM's normal on the right; there is a large perforation of the left TM;  nose and mouth without ulcers or lesions  RESP: lungs clear to auscultation - no rales, rhonchi or wheezes  CV: regular rate and rhythm, normal S1 S2, no S3 or S4 and no murmur, click or rub   ABDOMEN: soft, nontender, no HSM or masses and bowel sounds normal  NEURO: Normal strength and tone, sensory exam grossly normal, mentation intact and speech normal    Recent Labs   Lab Test 09/24/22  0525 09/23/22  1912 09/23/22  0553 09/22/22  0535 09/21/22  1444 06/23/22  0950   HGB 11.3*  --  9.4*   < > 11.8 5.9*     --  199   < > 252 299     --  140   < > 136 140   POTASSIUM 3.9 4.1 3.4   < > 3.3* 3.8   CR " 0.49*  --  0.46*   < > 0.55 0.60   A1C  --   --   --   --  8.1* 7.2*    < > = values in this interval not displayed.        Diagnostics:  No results found for any visits on 11/08/22.     EKG not indicated given her age.    Revised Cardiac Risk Index (RCRI):  The patient has the following serious cardiovascular risks for perioperative complications:   - No serious cardiac risks = 0 points     RCRI Interpretation: 0 points: Class I (very low risk - 0.4% complication rate)        Signed Electronically by: Froylan Cheema MD  Copy of this evaluation report is provided to requesting physician.

## 2022-11-08 NOTE — H&P (VIEW-ONLY)
[facility] [address] [place] [address]  Phone: [phone]  Fax: [phone]  Primary Provider: Sona Bran  Pre-op Performing Provider: BRIA PARRY      PREOPERATIVE EVALUATION:  Today's date: 11/8/2022    Lizzy Wright is a 42 year old female who presents for a preoperative evaluation.    Surgical Information:  Surgery/Procedure: Staghorn calculus in the right renal pelvis.  Surgery Location: pt unsure, not in [facility]  Surgeon: Anish (per [facility], pt unsure)  Surgery Date: 11/15/22 (Epic says 11/16/22)  Time of Surgery:  pt unsure, not in [facility]  Where patient plans to recover: At home with family.  Surgeon anticipates an overnight stay in the hospital.  Fax number for surgical facility:  pt unsure, not in Epic    Type of Anesthesia Anticipated: General    She has had a previous tubal ligation and still has a normal menstrual cycle.    Assessment & Plan     The proposed surgical procedure is considered MODERATE risk.    CKD stage G1/A3, GFR > 90 and albumin creatinine ratio >300 mg/g    - Comprehensive metabolic panel  - CBC with platelets           Risks and Recommendations:  The patient has the following additional risks and recommendations for perioperative complications:  Diabetes:  - Patient is not on insulin therapy: regular NPO guidelines can be followed.     Medication Instructions:  Patient is to take all scheduled medications on the day of surgery    RECOMMENDATION:  APPROVAL GIVEN to proceed with proposed procedure, without further diagnostic evaluation.    Review of the result(s) of each unique test - as above      Diagnosis or treatment significantly limited by social determinants of health - language barrier and low health literacy    30 minutes spent on the date of the encounter doing chart review, patient visit and documentation         Subjective     HPI related to upcoming procedure: Found to have a staghorn calculus.  Currently feeling fine.    Preop  Questions 11/8/2022   1. Have you ever had a heart attack or stroke? No   2. Have you ever had surgery on your heart or blood vessels, such as a stent placement, a coronary artery bypass, or surgery on an artery in your head, neck, heart, or legs? No   3. Do you have chest pain with activity? No   4. Do you have a history of  heart failure? No   5. Do you currently have a cold, bronchitis or symptoms of other infection? No   6. Do you have a cough, shortness of breath, or wheezing? No   7. Do you or anyone in your family have previous history of blood clots? No   8. Do you or does anyone in your family have a serious bleeding problem such as prolonged bleeding following surgeries or cuts? No   9. Have you ever had problems with anemia or been told to take iron pills? YES - On PO Fe    10. Have you had any abnormal blood loss such as black, tarry or bloody stools, or abnormal vaginal bleeding? No   11. Have you ever had a blood transfusion? YES - For iron def anemia   11a. Have you ever had a transfusion reaction? No   12. Are you willing to have a blood transfusion if it is medically needed before, during, or after your surgery? Yes   13. Have you or any of your relatives ever had problems with anesthesia? No   14. Do you have sleep apnea, excessive snoring or daytime drowsiness? No   15. Do you have any artifical heart valves or other implanted medical devices like a pacemaker, defibrillator, or continuous glucose monitor? No   16. Do you have artificial joints? No   17. Are you allergic to latex? No   18. Is there any chance that you may be pregnant? No       Health Care Directive:  Patient does not have a Health Care Directive or Living Will: Discussed advance care planning with patient; however, patient declined at this time.    Preoperative Review of :   reviewed - no record of controlled substances prescribed.      Status of Chronic Conditions:  DIABETES - Patient has a longstanding history of  DiabetesType Type II . Patient is being treated with oral agents and denies significant side effects. Control has been good. Complicating factors include but are not limited to: proteinuria.. A1c was 8.1 on 9/21/22.  Pt is not on a statin but just turned 42 2 weeks ago.      Review of Systems  CONSTITUTIONAL: NEGATIVE for fever, chills, change in weight  ENT/MOUTH: NEGATIVE for ear, mouth and throat problems  RESP: NEGATIVE for significant cough or SOB  CV: NEGATIVE for chest pain, palpitations or peripheral edema    Patient Active Problem List    Diagnosis Date Noted     Pyelonephritis, acute 09/21/2022     Priority: Medium     Staghorn calculus (right) 09/21/2022     Priority: Medium     Presented to ER with UTI, found to have below    Imaging  9/21/22 I personally reviewed the CT scan and by my interpretation it demonstrates full staghorn calculus on the right kidney. Left kidney is normal.     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   5/2020: Low ferritin  6/2018: Hemoglobinopathy/thalassemia cascade was negative however pathologist recommended repeating once iron stores had been replenished. No personal or family history of any bleeding disorders or other genetic issues. She does have a history of a vegetarian diet.          Perforation of tympanic membrane, left 06/20/2018     Priority: Medium     Diabetes mellitus with proteinuria (H) 12/08/2014     Priority: Medium     Hx of GDM. Diagnosed with type 2 diabetes in 2014. Has received treatment inconsistently since diagnosis due to insurance issues.         Past Medical History:   Diagnosis Date     Anemia       Diabetes (H)      Diabetes mellitus, type II (H)      H/O gestational diabetes mellitus, not currently pregnant 2013    Pt was started on insulin by HE DM educator on 10/3/13.  She is on NPH 12 units at bedtime and Humalog 1 unit per carb with meals.  She will NEED weekly BPP/NST until delivers at [facility]      H/O seasonal allergies      History of  2013    First c-sect was done in  in [facility] and so she has unknown scar on uterus.  She had repeat c-sect  by Dr Pineda (Jasmin OBNIGEL) at [facility] and 13 at [facility] (had tubal ligation)      History of insulin dependent diabetes mellitus 2018     Past Surgical History:   Procedure Laterality Date      SECTION  2008 and       SECTION       TUBAL LIGATION       TUBAL LIGATION       Current Outpatient Medications   Medication Sig Dispense Refill     senna-docusate (SENOKOT-S/PERICOLACE) 8.6-50 MG tablet Take 1 tablet by mouth daily 90 tablet 3     acetaminophen (TYLENOL) 325 MG tablet Take 1-2 tablets (325-650 mg) by mouth every 6 hours as needed for mild pain 100 tablet 1     blood glucose (NO BRAND SPECIFIED) test strip Use to test blood sugar 1 times daily or as directed. To accompany: Blood Glucose Monitor Brands: per insurance. 100 strip 3     blood glucose monitoring (NO BRAND SPECIFIED) meter device kit Use to test blood sugar 1 times daily or as directed. Preferred blood glucose meter OR supplies to accompany: Blood Glucose Monitor Brands: per insurance. 1 kit 0     cefuroxime (CEFTIN) 500 MG tablet Take 1 tablet (500 mg) by mouth 2 times daily 30 tablet 1     cefuroxime (CEFTIN) 500 MG tablet Take 1 tablet (500 mg) by mouth 2 times daily 42 tablet 0     dapagliflozin (FARXIGA) 5 MG TABS tablet Take 1 tablet (5 mg) by mouth daily 90 tablet 3     ferrous sulfate (FEROSUL) 325 (65 Fe) MG tablet Take 1 tablet (325 mg) by mouth 2 times daily (with meals) 180 tablet 3     metFORMIN (GLUCOPHAGE XR)  "500 MG 24 hr tablet Take 1 tablet (500 mg) by mouth daily (with dinner) for 7 days, THEN 2 tablets (1,000 mg) daily (with dinner) for 7 days, THEN 3 tablets (1,500 mg) daily (with dinner) for 7 days, THEN 4 tablets (2,000 mg) daily (with dinner) for 99 days. (Patient taking differently: Take 2 tablets daily with dinner) 360 tablet 3     thin (NO BRAND SPECIFIED) lancets Use with lanceting device. Use to test 1 times per day. To accompany: Blood Glucose Monitor Brands: per insurance. 100 each 3       Allergies   Allergen Reactions     Nkda [No Known Drug Allergies]         Social History     Tobacco Use     Smoking status: Never     Smokeless tobacco: Never   Substance Use Topics     Alcohol use: No     Family History   Problem Relation Age of Onset     Family History Negative Other      Diabetes No family hx of      Coronary Artery Disease No family hx of      Cancer No family hx of      Heart Disease No family hx of      History   Drug Use No         Objective     /84   Pulse 100   Temp 97.9  F (36.6  C) (Oral)   Resp 20   Ht 1.549 m (5' 1\")   Wt 63.4 kg (139 lb 12.8 oz)   LMP 10/20/2022 (Exact Date)   SpO2 98%   BMI 26.41 kg/m      Physical Exam  GENERAL APPEARANCE: healthy, alert and no distress  HENT: ear canals and TM's normal on the right; there is a large perforation of the left TM;  nose and mouth without ulcers or lesions  RESP: lungs clear to auscultation - no rales, rhonchi or wheezes  CV: regular rate and rhythm, normal S1 S2, no S3 or S4 and no murmur, click or rub   ABDOMEN: soft, nontender, no HSM or masses and bowel sounds normal  NEURO: Normal strength and tone, sensory exam grossly normal, mentation intact and speech normal    Recent Labs   Lab Test 09/24/22  0525 09/23/22  1912 09/23/22  0553 09/22/22  0535 09/21/22  1444 06/23/22  0950   HGB 11.3*  --  9.4*   < > 11.8 5.9*     --  199   < > 252 299     --  140   < > 136 140   POTASSIUM 3.9 4.1 3.4   < > 3.3* 3.8   CR " 0.49*  --  0.46*   < > 0.55 0.60   A1C  --   --   --   --  8.1* 7.2*    < > = values in this interval not displayed.        Diagnostics:  No results found for any visits on 11/08/22.     EKG not indicated given her age.    Revised Cardiac Risk Index (RCRI):  The patient has the following serious cardiovascular risks for perioperative complications:   - No serious cardiac risks = 0 points     RCRI Interpretation: 0 points: Class I (very low risk - 0.4% complication rate)        Signed Electronically by: Froylan Cheema MD  Copy of this evaluation report is provided to requesting physician.

## 2022-11-09 ENCOUNTER — PATIENT OUTREACH (OUTPATIENT)
Dept: UROLOGY | Facility: CLINIC | Age: 42
End: 2022-11-09

## 2022-11-09 DIAGNOSIS — N39.0 URINARY TRACT INFECTION: Primary | ICD-10-CM

## 2022-11-09 RX ORDER — CIPROFLOXACIN 500 MG/1
500 TABLET, FILM COATED ORAL 2 TIMES DAILY
Qty: 10 TABLET | Refills: 0 | Status: ON HOLD | OUTPATIENT
Start: 2022-11-09 | End: 2022-11-15

## 2022-11-09 NOTE — TELEPHONE ENCOUNTER
cipro 500mg bid until surgery sent per Dr. Oconnell, and in addition, she will need to complete a UC as soon as possible.     [facility] and [facility] this Friday    Pt states understanding.

## 2022-11-10 ENCOUNTER — OFFICE VISIT (OUTPATIENT)
Dept: FAMILY MEDICINE | Facility: CLINIC | Age: 42
End: 2022-11-10
Payer: COMMERCIAL

## 2022-11-10 VITALS
TEMPERATURE: 98.6 F | RESPIRATION RATE: 18 BRPM | SYSTOLIC BLOOD PRESSURE: 103 MMHG | BODY MASS INDEX: 26.43 KG/M2 | OXYGEN SATURATION: 98 % | HEIGHT: 61 IN | HEART RATE: 96 BPM | WEIGHT: 140 LBS | DIASTOLIC BLOOD PRESSURE: 73 MMHG

## 2022-11-10 DIAGNOSIS — Z01.818 PREOP GENERAL PHYSICAL EXAM: Primary | ICD-10-CM

## 2022-11-10 DIAGNOSIS — E11.29 DIABETES MELLITUS WITH PROTEINURIA (H): ICD-10-CM

## 2022-11-10 DIAGNOSIS — R80.9 DIABETES MELLITUS WITH PROTEINURIA (H): ICD-10-CM

## 2022-11-10 DIAGNOSIS — Z11.59 NEED FOR HEPATITIS C SCREENING TEST: ICD-10-CM

## 2022-11-10 DIAGNOSIS — N20.0 STAGHORN CALCULUS: ICD-10-CM

## 2022-11-10 PROCEDURE — 87086 URINE CULTURE/COLONY COUNT: CPT | Performed by: FAMILY MEDICINE

## 2022-11-10 PROCEDURE — U0003 INFECTIOUS AGENT DETECTION BY NUCLEIC ACID (DNA OR RNA); SEVERE ACUTE RESPIRATORY SYNDROME CORONAVIRUS 2 (SARS-COV-2) (CORONAVIRUS DISEASE [COVID-19]), AMPLIFIED PROBE TECHNIQUE, MAKING USE OF HIGH THROUGHPUT TECHNOLOGIES AS DESCRIBED BY CMS-2020-01-R: HCPCS | Performed by: FAMILY MEDICINE

## 2022-11-10 PROCEDURE — 99213 OFFICE O/P EST LOW 20 MIN: CPT | Mod: CS | Performed by: FAMILY MEDICINE

## 2022-11-10 PROCEDURE — U0005 INFEC AGEN DETEC AMPLI PROBE: HCPCS | Performed by: FAMILY MEDICINE

## 2022-11-10 NOTE — NURSING NOTE
Due to patient being non-English speaking/uses sign language, an  was used for this visit. Only for face-to-face interpretation by an external agency, date and length of interpretation can be found on the scanned worksheet.     name: CHICHI   Agency: [employer]  Language: Tabatha   Telephone number: [phone]  Type of interpretation: Telephone, spoken

## 2022-11-10 NOTE — PROGRESS NOTES
[facility] [address] [place] [address]  Phone: [phone]  Fax: [phone]  Primary Provider: Sona Bran  Pre-op Performing Provider: PATRICK BURGESS    {Provider  Link to PREOP SmartSet  Use this to apply standard patient instructions to AVS; includes medication directions, common orders, guidelines for anemia, warfarin, additional testing   :172963}  PREOPERATIVE EVALUATION:  Today's date: 11/10/2022    Lizzy Wright is a 42 year old female who presents for a preoperative evaluation.    Surgical Information:  Surgery/Procedure: ***  Surgery Location: ***  Surgeon: ***  Surgery Date: ***  Time of Surgery: ***  Where patient plans to recover: {Preop post recovery plans :867877}  Fax number for surgical facility: {SURGERY FAX NUMBER:[id number]}    Type of Anesthesia Anticipated: {ANESTHESIA:185566}    {2021 Provider Charting Preference for Preop :860231}    Subjective     HPI related to upcoming procedure: ***    Preop Questions 11/10/2022   1. Have you ever had a heart attack or stroke? No   2. Have you ever had surgery on your heart or blood vessels, such as a stent placement, a coronary artery bypass, or surgery on an artery in your head, neck, heart, or legs? No   3. Do you have chest pain with activity? YES - ***   4. Do you have a history of  heart failure? No   5. Do you currently have a cold, bronchitis or symptoms of other infection? No   6. Do you have a cough, shortness of breath, or wheezing? No   7. Do you or anyone in your family have previous history of blood clots? No   8. Do you or does anyone in your family have a serious bleeding problem such as prolonged bleeding following surgeries or cuts? No   9. Have you ever had problems with anemia or been told to take iron pills? YES - ***   10. Have you had any abnormal blood loss such as black, tarry or bloody stools, or abnormal vaginal bleeding? No   11. Have you ever had a blood transfusion? YES - ***   11a. Have  you ever had a transfusion reaction? No   12. Are you willing to have a blood transfusion if it is medically needed before, during, or after your surgery? Yes   13. Have you or any of your relatives ever had problems with anesthesia? No   14. Do you have sleep apnea, excessive snoring or daytime drowsiness? No   15. Do you have any artifical heart valves or other implanted medical devices like a pacemaker, defibrillator, or continuous glucose monitor? No   16. Do you have artificial joints? No   17. Are you allergic to latex? No   18. Is there any chance that you may be pregnant? No       Health Care Directive:  Patient does not have a Health Care Directive or Living Will: {ADVANCE_DIRECTIVE_STATUS:868999}    Preoperative Review of :  {Mnpmpreport:840195}  {Review MNPMP for all patients per [facility] MNPMP Profile:534247}    {Chronic problem details (Optional) :799389}    Review of Systems  {ROS Preop Choices:440793}    Patient Active Problem List    Diagnosis Date Noted     Pyelonephritis, acute 09/21/2022     Priority: Medium     Staghorn calculus (right) 09/21/2022     Priority: Medium     Presented to ER with UTI, found to have below    Imaging  9/21/22 I personally reviewed the CT scan and by my interpretation it demonstrates full staghorn calculus on the right kidney. Left kidney is normal.     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   5/2020: Low ferritin  6/2018: Hemoglobinopathy/thalassemia cascade was negative however pathologist recommended repeating once iron stores had been replenished. No personal or family history of any bleeding disorders or other  genetic issues. She does have a history of a vegetarian diet.          Perforation of tympanic membrane, left 2018     Priority: Medium     Diabetes mellitus with proteinuria (H) 2014     Priority: Medium     Hx of GDM. Diagnosed with type 2 diabetes in . Has received treatment inconsistently since diagnosis due to insurance issues.         Past Medical History:   Diagnosis Date     Anemia      Diabetes (H)      Diabetes mellitus, type II (H)      H/O gestational diabetes mellitus, not currently pregnant 2013    Pt was started on insulin by HE DM educator on 10/3/13.  She is on NPH 12 units at bedtime and Humalog 1 unit per carb with meals.  She will NEED weekly BPP/NST until delivers at [facility]./[place]      H/O seasonal allergies      History of  2013    First c-sect was done in  in [facility] and so she has unknown scar on uterus.  She had repeat c-sect  by Dr Pineda (Jasmin LIPSCOMB) at [facility] and 13 at [facility] (had tubal ligation)      History of insulin dependent diabetes mellitus 2018     Past Surgical History:   Procedure Laterality Date      SECTION  2008 and       SECTION       TUBAL LIGATION       TUBAL LIGATION       Current Outpatient Medications   Medication Sig Dispense Refill     acetaminophen (TYLENOL) 325 MG tablet Take 1-2 tablets (325-650 mg) by mouth every 6 hours as needed for mild pain 100 tablet 1     blood glucose (NO BRAND SPECIFIED) test strip Use to test blood sugar 1 times daily or as directed. To accompany: Blood Glucose Monitor Brands: per insurance. 100 strip 3     blood glucose monitoring (NO BRAND SPECIFIED) meter device kit Use to test blood sugar 1 times daily or as directed. Preferred blood glucose meter OR supplies to accompany: Blood Glucose Monitor Brands: per insurance. 1 kit 0     ciprofloxacin (CIPRO) 500 MG tablet Take 1 tablet (500 mg) by mouth 2 times daily for 5 days 10 tablet 0      "dapagliflozin (FARXIGA) 5 MG TABS tablet Take 1 tablet (5 mg) by mouth daily 90 tablet 3     ferrous sulfate (FEROSUL) 325 (65 Fe) MG tablet Take 1 tablet (325 mg) by mouth 2 times daily (with meals) 180 tablet 3     metFORMIN (GLUCOPHAGE XR) 500 MG 24 hr tablet Take 1 tablet (500 mg) by mouth daily (with dinner) for 7 days, THEN 2 tablets (1,000 mg) daily (with dinner) for 7 days, THEN 3 tablets (1,500 mg) daily (with dinner) for 7 days, THEN 4 tablets (2,000 mg) daily (with dinner) for 99 days. (Patient taking differently: Take 2 tablets daily with dinner) 360 tablet 3     senna-docusate (SENOKOT-S/PERICOLACE) 8.6-50 MG tablet Take 1 tablet by mouth daily 90 tablet 3     thin (NO BRAND SPECIFIED) lancets Use with lanceting device. Use to test 1 times per day. To accompany: Blood Glucose Monitor Brands: per insurance. 100 each 3       Allergies   Allergen Reactions     Nkda [No Known Drug Allergies]         Social History     Tobacco Use     Smoking status: Never     Smokeless tobacco: Never   Substance Use Topics     Alcohol use: No     {FAMILY HISTORY (Optional):758624829}  History   Drug Use No         Objective     /73 (BP Location: Left arm, Patient Position: Sitting, Cuff Size: Adult Regular)   Pulse 96   Temp 98.6  F (37  C) (Oral)   Resp 18   Ht 1.552 m (5' 1.1\")   Wt 63.5 kg (140 lb)   LMP 10/20/2022 (Exact Date)   SpO2 98%   BMI 26.36 kg/m      Physical Exam  {EXAM Preop Choices:682818}    Recent Labs   Lab Test 22  0819 22  0525 22  0535 22  1444 22  0950   HGB 12.1 11.3*   < > 11.8 5.9*    296   < > 252 299    138   < > 136 140   POTASSIUM 3.5 3.9   < > 3.3* 3.8   CR 0.52 0.49*   < > 0.55 0.60   A1C  --   --   --  8.1* 7.2*    < > = values in this interval not displayed.        Diagnostics:  {LABS:527983}   {EK}    Revised Cardiac Risk Index (RCRI):  The patient has the following serious cardiovascular risks for perioperative " "complications:  {PREOP REVISED CARDIAC RISK INDEX (RCRI) :973383::\" - No serious cardiac risks = 0 points\"}     RCRI Interpretation: {REVISED CARDIAC RISK INTERPRETATION :459769}         Signed Electronically by: Holland Price MD  Copy of this evaluation report is provided to requesting physician.    {Provider Resources  Preop [facility] Preop Guidelines  Revised Cardiac Risk Index :105825}  "

## 2022-11-10 NOTE — PROGRESS NOTES
"  Assessment & Plan     Diabetes mellitus with proteinuria (H)  Seen in preparation for Procedure Monday Nov 14th.  Had \"Pre Op\" at Clinic 10/8/22; see note for details.    Lizzy is taking Cipro.  Lizzy gave a urine sample for culture.  Covid swab was obtained.  Rx for Glucose test strips were sent to pharmacy.    BPA: Not on statin.  Not on ACE/ARB  Appointment made with PCP in 4 weeks to follow up on DM and discuss above.    Holland Price MD  - blood glucose (NO BRAND SPECIFIED) test strip; Use to test blood sugar 1 times daily or as directed. To accompany: Blood Glucose Monitor Brands: per insurance.    Need for hepatitis C screening test    - Hepatitis C Screen Reflex to HCV RNA Quant and Genotype; Future    Staghorn calculus    - Urine Culture Aerobic Bacterial    Preop general physical exam    - Asymptomatic COVID-19 Virus (Coronavirus) by PCR Nose    Diagnosis or treatment significantly limited by social determinants of health - LEP  23 minutes spent on the date of the encounter doing chart review, history and exam, documentation and further activities per the note       BMI:   Estimated body mass index is 26.36 kg/m  as calculated from the following:    Height as of this encounter: 1.552 m (5' 1.1\").    Weight as of this encounter: 63.5 kg (140 lb).   Weight management plan: Discussed healthy diet and exercise guidelines      Holland Price MD  [facility]    Subjective   Lizzy is a 42 year old accompanied by her , presenting for the following health issues:  Pre-Op Exam (Kidney stone surgery on 11/14/22. Came in here on 11/8/22 for pre-op. The surgery center told me I need to recheck my urine per pt ) and OTHER (Have question about two surgery that they told me. They said I would have surgery on Monday 11/14/22 and another one on Wednesday. I don't understand what kind of surgery I have to do on Wednesday? )      HPI     Seen in preparation for Procedure Monday Nov 14th.  Had \"Pre Op\" " "at Clinic 10/8/22; see note for details.    Lizzy is taking Cipro.  Ball gave a urine sample for culture.  Covid swab was obtained.  Rx for Glucose test strips were sent to pharmacy.    BPA: Not on statin.  Not on ACE/ARB  Appointment made with PCP in 4 weeks to follow up on DM and discuss above.    Review of Systems   Constitutional, HEENT, cardiovascular, pulmonary, gi and gu systems are negative, except as otherwise noted.      Objective    /73 (BP Location: Left arm, Patient Position: Sitting, Cuff Size: Adult Regular)   Pulse 96   Temp 98.6  F (37  C) (Oral)   Resp 18   Ht 1.552 m (5' 1.1\")   Wt 63.5 kg (140 lb)   LMP 10/20/2022 (Exact Date)   SpO2 98%   BMI 26.36 kg/m    Body mass index is 26.36 kg/m .  Physical Exam   GENERAL: healthy, alert and no distress  NECK: no adenopathy, no asymmetry, masses, or scars and thyroid normal to palpation  RESP: lungs clear to auscultation - no rales, rhonchi or wheezes  CV: regular rate and rhythm, normal S1 S2, no S3 or S4, no murmur, click or rub, no peripheral edema and peripheral pulses strong  ABDOMEN: soft, nontender, no hepatosplenomegaly, no masses and bowel sounds normal  MS: no gross musculoskeletal defects noted, no edema    Office Visit on 11/08/2022   Component Date Value Ref Range Status     Sodium 11/08/2022 139  136 - 145 mmol/L Final     Potassium 11/08/2022 3.5  3.4 - 5.3 mmol/L Final     Chloride 11/08/2022 102  98 - 107 mmol/L Final     Carbon Dioxide (CO2) 11/08/2022 22  22 - 29 mmol/L Final     Anion Gap 11/08/2022 15  7 - 15 mmol/L Final     Urea Nitrogen 11/08/2022 9.2  6.0 - 20.0 mg/dL Final     Creatinine 11/08/2022 0.52  0.51 - 0.95 mg/dL Final     Calcium 11/08/2022 9.6  8.6 - 10.0 mg/dL Final     Glucose 11/08/2022 172 (H)  70 - 99 mg/dL Final     Alkaline Phosphatase 11/08/2022 74  35 - 104 U/L Final     AST 11/08/2022 24  10 - 35 U/L Final     ALT 11/08/2022 29  10 - 35 U/L Final     Protein Total 11/08/2022 8.6 (H)  6.4 - 8.3 g/dL " Final     Albumin 11/08/2022 5.0  3.5 - 5.2 g/dL Final     Bilirubin Total 11/08/2022 1.0  <=1.2 mg/dL Final     GFR Estimate 11/08/2022 >90  >60 mL/min/1.73m2 Final    Effective December 21, 2021 eGFRcr in adults is calculated using the 2021 CKD-EPI creatinine equation which includes age and gender (Nellie et al., [facility], DOI: 10.[facility]6/SYMQsf5866631)     WBC Count 11/08/2022 8.3  4.0 - 11.0 10e3/uL Final     RBC Count 11/08/2022 4.62  3.80 - 5.20 10e6/uL Final     Hemoglobin 11/08/2022 12.1  11.7 - 15.7 g/dL Final     Hematocrit 11/08/2022 37.0  35.0 - 47.0 % Final     MCV 11/08/2022 80  78 - 100 fL Final     MCH 11/08/2022 26.2 (L)  26.5 - 33.0 pg Final     MCHC 11/08/2022 32.7  31.5 - 36.5 g/dL Final     RDW 11/08/2022 14.7  10.0 - 15.0 % Final     Platelet Count 11/08/2022 293  150 - 450 10e3/uL Final     Cholesterol 11/08/2022 220 (H)  <200 mg/dL Final     Triglycerides 11/08/2022 126  <150 mg/dL Final     Direct Measure HDL 11/08/2022 42 (L)  >=50 mg/dL Final     LDL Cholesterol Calculated 11/08/2022 153 (H)  <=100 mg/dL Final     Non HDL Cholesterol 11/08/2022 178 (H)  <130 mg/dL Final

## 2022-11-11 LAB
HCV AB SERPL QL IA: NONREACTIVE
SARS-COV-2 RNA RESP QL NAA+PROBE: NEGATIVE

## 2022-11-12 LAB — BACTERIA UR CULT: NORMAL

## 2022-11-13 ENCOUNTER — ANESTHESIA EVENT (OUTPATIENT)
Dept: SURGERY | Facility: CLINIC | Age: 42
End: 2022-11-13
Payer: COMMERCIAL

## 2022-11-14 ENCOUNTER — ANESTHESIA (OUTPATIENT)
Dept: SURGERY | Facility: CLINIC | Age: 42
End: 2022-11-14
Payer: COMMERCIAL

## 2022-11-14 ENCOUNTER — APPOINTMENT (OUTPATIENT)
Dept: GENERAL RADIOLOGY | Facility: CLINIC | Age: 42
End: 2022-11-14
Attending: UROLOGY
Payer: COMMERCIAL

## 2022-11-14 ENCOUNTER — HOSPITAL ENCOUNTER (OUTPATIENT)
Facility: CLINIC | Age: 42
Discharge: HOME | End: 2022-11-15
Attending: UROLOGY | Admitting: UROLOGY
Payer: COMMERCIAL

## 2022-11-14 DIAGNOSIS — N20.0 NEPHROLITHIASIS: Primary | ICD-10-CM

## 2022-11-14 DIAGNOSIS — N20.0 STAGHORN CALCULUS: ICD-10-CM

## 2022-11-14 LAB
ABO/RH(D): NORMAL
ANION GAP SERPL CALCULATED.3IONS-SCNC: 6 MMOL/L (ref 3–14)
ANTIBODY SCREEN: NEGATIVE
BASOPHILS # BLD AUTO: 0 10E3/UL (ref 0–0.2)
BASOPHILS NFR BLD AUTO: 0 %
BUN SERPL-MCNC: 13 MG/DL (ref 7–30)
CALCIUM SERPL-MCNC: 8 MG/DL (ref 8.5–10.1)
CHLORIDE BLD-SCNC: 111 MMOL/L (ref 94–109)
CO2 SERPL-SCNC: 23 MMOL/L (ref 20–32)
CREAT SERPL-MCNC: 0.54 MG/DL (ref 0.52–1.04)
EOSINOPHIL # BLD AUTO: 0 10E3/UL (ref 0–0.7)
EOSINOPHIL NFR BLD AUTO: 0 %
ERYTHROCYTE [DISTWIDTH] IN BLOOD BY AUTOMATED COUNT: 15.5 % (ref 10–15)
GFR SERPL CREATININE-BSD FRML MDRD: >90 ML/MIN/1.73M2
GLUCOSE BLD-MCNC: 242 MG/DL (ref 70–99)
GLUCOSE BLDC GLUCOMTR-MCNC: 144 MG/DL (ref 70–99)
GLUCOSE BLDC GLUCOMTR-MCNC: 206 MG/DL (ref 70–99)
HCT VFR BLD AUTO: 31 % (ref 35–47)
HGB BLD-MCNC: 9.9 G/DL (ref 11.7–15.7)
HOLD SPECIMEN: NORMAL
IMM GRANULOCYTES # BLD: 0 10E3/UL
IMM GRANULOCYTES NFR BLD: 0 %
LYMPHOCYTES # BLD AUTO: 0.7 10E3/UL (ref 0.8–5.3)
LYMPHOCYTES NFR BLD AUTO: 6 %
MCH RBC QN AUTO: 26.9 PG (ref 26.5–33)
MCHC RBC AUTO-ENTMCNC: 31.9 G/DL (ref 31.5–36.5)
MCV RBC AUTO: 84 FL (ref 78–100)
MONOCYTES # BLD AUTO: 0.2 10E3/UL (ref 0–1.3)
MONOCYTES NFR BLD AUTO: 2 %
NEUTROPHILS # BLD AUTO: 10 10E3/UL (ref 1.6–8.3)
NEUTROPHILS NFR BLD AUTO: 92 %
NRBC # BLD AUTO: 0 10E3/UL
NRBC BLD AUTO-RTO: 0 /100
PLATELET # BLD AUTO: 215 10E3/UL (ref 150–450)
POTASSIUM BLD-SCNC: 3.5 MMOL/L (ref 3.4–5.3)
RBC # BLD AUTO: 3.68 10E6/UL (ref 3.8–5.2)
SODIUM SERPL-SCNC: 140 MMOL/L (ref 133–144)
SPECIMEN EXPIRATION DATE: NORMAL
WBC # BLD AUTO: 11 10E3/UL (ref 4–11)

## 2022-11-14 PROCEDURE — 250N000009 HC RX 250: Performed by: UROLOGY

## 2022-11-14 PROCEDURE — 250N000011 HC RX IP 250 OP 636: Performed by: STUDENT IN AN ORGANIZED HEALTH CARE EDUCATION/TRAINING PROGRAM

## 2022-11-14 PROCEDURE — 710N000009 HC RECOVERY PHASE 1, LEVEL 1, PER MIN: Performed by: UROLOGY

## 2022-11-14 PROCEDURE — 272N000001 HC OR GENERAL SUPPLY STERILE: Performed by: UROLOGY

## 2022-11-14 PROCEDURE — 999N000065 XR CHEST PORT 1 VIEW

## 2022-11-14 PROCEDURE — 85025 COMPLETE CBC W/AUTO DIFF WBC: CPT | Performed by: STUDENT IN AN ORGANIZED HEALTH CARE EDUCATION/TRAINING PROGRAM

## 2022-11-14 PROCEDURE — 250N000011 HC RX IP 250 OP 636: Performed by: UROLOGY

## 2022-11-14 PROCEDURE — C1894 INTRO/SHEATH, NON-LASER: HCPCS | Performed by: UROLOGY

## 2022-11-14 PROCEDURE — 82962 GLUCOSE BLOOD TEST: CPT

## 2022-11-14 PROCEDURE — 36415 COLL VENOUS BLD VENIPUNCTURE: CPT | Performed by: STUDENT IN AN ORGANIZED HEALTH CARE EDUCATION/TRAINING PROGRAM

## 2022-11-14 PROCEDURE — 52005 CYSTO W/URTRL CATHJ: CPT | Mod: GC | Performed by: UROLOGY

## 2022-11-14 PROCEDURE — 80048 BASIC METABOLIC PNL TOTAL CA: CPT | Performed by: STUDENT IN AN ORGANIZED HEALTH CARE EDUCATION/TRAINING PROGRAM

## 2022-11-14 PROCEDURE — 250N000009 HC RX 250: Performed by: STUDENT IN AN ORGANIZED HEALTH CARE EDUCATION/TRAINING PROGRAM

## 2022-11-14 PROCEDURE — 74420 UROGRAPHY RTRGR +-KUB: CPT | Mod: 26 | Performed by: UROLOGY

## 2022-11-14 PROCEDURE — C2617 STENT, NON-COR, TEM W/O DEL: HCPCS | Performed by: UROLOGY

## 2022-11-14 PROCEDURE — 120N000002 HC R&B MED SURG/OB UMMC

## 2022-11-14 PROCEDURE — 86901 BLOOD TYPING SEROLOGIC RH(D): CPT | Performed by: UROLOGY

## 2022-11-14 PROCEDURE — 250N000025 HC SEVOFLURANE, PER MIN: Performed by: UROLOGY

## 2022-11-14 PROCEDURE — 360N000084 HC SURGERY LEVEL 4 W/ FLUORO, PER MIN: Performed by: UROLOGY

## 2022-11-14 PROCEDURE — 250N000013 HC RX MED GY IP 250 OP 250 PS 637: Performed by: STUDENT IN AN ORGANIZED HEALTH CARE EDUCATION/TRAINING PROGRAM

## 2022-11-14 PROCEDURE — 255N000002 HC RX 255 OP 636: Performed by: UROLOGY

## 2022-11-14 PROCEDURE — 999N000141 HC STATISTIC PRE-PROCEDURE NURSING ASSESSMENT: Performed by: UROLOGY

## 2022-11-14 PROCEDURE — 71045 X-RAY EXAM CHEST 1 VIEW: CPT | Mod: 26 | Performed by: RADIOLOGY

## 2022-11-14 PROCEDURE — 999N000180 XR SURGERY CARM FLUORO LESS THAN 5 MIN: Mod: TC

## 2022-11-14 PROCEDURE — 258N000003 HC RX IP 258 OP 636: Performed by: UROLOGY

## 2022-11-14 PROCEDURE — 370N000017 HC ANESTHESIA TECHNICAL FEE, PER MIN: Performed by: UROLOGY

## 2022-11-14 PROCEDURE — 258N000003 HC RX IP 258 OP 636: Performed by: STUDENT IN AN ORGANIZED HEALTH CARE EDUCATION/TRAINING PROGRAM

## 2022-11-14 PROCEDURE — 86850 RBC ANTIBODY SCREEN: CPT | Performed by: UROLOGY

## 2022-11-14 PROCEDURE — 50081 PERQ NL/PL LITHOTRP CPLX>2CM: CPT | Mod: RT | Performed by: UROLOGY

## 2022-11-14 PROCEDURE — 82365 CALCULUS SPECTROSCOPY: CPT | Performed by: UROLOGY

## 2022-11-14 PROCEDURE — 87077 CULTURE AEROBIC IDENTIFY: CPT | Performed by: UROLOGY

## 2022-11-14 PROCEDURE — 36415 COLL VENOUS BLD VENIPUNCTURE: CPT | Performed by: UROLOGY

## 2022-11-14 PROCEDURE — C1887 CATHETER, GUIDING: HCPCS | Performed by: UROLOGY

## 2022-11-14 PROCEDURE — C1725 CATH, TRANSLUMIN NON-LASER: HCPCS | Performed by: UROLOGY

## 2022-11-14 PROCEDURE — C1769 GUIDE WIRE: HCPCS | Performed by: UROLOGY

## 2022-11-14 PROCEDURE — 250N000013 HC RX MED GY IP 250 OP 250 PS 637: Performed by: UROLOGY

## 2022-11-14 DEVICE — URETERAL STENT
Type: IMPLANTABLE DEVICE | Site: ABDOMEN | Status: FUNCTIONAL
Brand: PERCUFLEX™ PLUS

## 2022-11-14 RX ORDER — PROCHLORPERAZINE MALEATE 10 MG
10 TABLET ORAL EVERY 6 HOURS PRN
Status: DISCONTINUED | OUTPATIENT
Start: 2022-11-14 | End: 2022-11-15 | Stop reason: HOSPADM

## 2022-11-14 RX ORDER — OXYBUTYNIN CHLORIDE 5 MG/1
5 TABLET ORAL EVERY 8 HOURS PRN
Status: DISCONTINUED | OUTPATIENT
Start: 2022-11-14 | End: 2022-11-15 | Stop reason: HOSPADM

## 2022-11-14 RX ORDER — POLYETHYLENE GLYCOL 3350 17 G/17G
17 POWDER, FOR SOLUTION ORAL DAILY
Status: DISCONTINUED | OUTPATIENT
Start: 2022-11-15 | End: 2022-11-15 | Stop reason: HOSPADM

## 2022-11-14 RX ORDER — BISACODYL 10 MG
10 SUPPOSITORY, RECTAL RECTAL DAILY PRN
Status: DISCONTINUED | OUTPATIENT
Start: 2022-11-14 | End: 2022-11-15 | Stop reason: HOSPADM

## 2022-11-14 RX ORDER — HYDRALAZINE HYDROCHLORIDE 20 MG/ML
2.5-5 INJECTION INTRAMUSCULAR; INTRAVENOUS EVERY 10 MIN PRN
Status: DISCONTINUED | OUTPATIENT
Start: 2022-11-14 | End: 2022-11-14 | Stop reason: HOSPADM

## 2022-11-14 RX ORDER — LIDOCAINE HYDROCHLORIDE 20 MG/ML
INJECTION, SOLUTION INFILTRATION; PERINEURAL PRN
Status: DISCONTINUED | OUTPATIENT
Start: 2022-11-14 | End: 2022-11-14

## 2022-11-14 RX ORDER — OXYCODONE HYDROCHLORIDE 5 MG/1
10 TABLET ORAL EVERY 4 HOURS PRN
Status: DISCONTINUED | OUTPATIENT
Start: 2022-11-14 | End: 2022-11-15 | Stop reason: HOSPADM

## 2022-11-14 RX ORDER — PROPOFOL 10 MG/ML
INJECTION, EMULSION INTRAVENOUS PRN
Status: DISCONTINUED | OUTPATIENT
Start: 2022-11-14 | End: 2022-11-14

## 2022-11-14 RX ORDER — OXYBUTYNIN CHLORIDE 5 MG/1
5 TABLET ORAL ONCE
Status: COMPLETED | OUTPATIENT
Start: 2022-11-14 | End: 2022-11-14

## 2022-11-14 RX ORDER — DEXAMETHASONE SODIUM PHOSPHATE 4 MG/ML
INJECTION, SOLUTION INTRA-ARTICULAR; INTRALESIONAL; INTRAMUSCULAR; INTRAVENOUS; SOFT TISSUE PRN
Status: DISCONTINUED | OUTPATIENT
Start: 2022-11-14 | End: 2022-11-14

## 2022-11-14 RX ORDER — TAMSULOSIN HYDROCHLORIDE 0.4 MG/1
0.4 CAPSULE ORAL DAILY
Status: DISCONTINUED | OUTPATIENT
Start: 2022-11-14 | End: 2022-11-15 | Stop reason: HOSPADM

## 2022-11-14 RX ORDER — HYDROMORPHONE HCL IN WATER/PF 6 MG/30 ML
0.2 PATIENT CONTROLLED ANALGESIA SYRINGE INTRAVENOUS
Status: DISCONTINUED | OUTPATIENT
Start: 2022-11-14 | End: 2022-11-15 | Stop reason: HOSPADM

## 2022-11-14 RX ORDER — ONDANSETRON 4 MG/1
4 TABLET, ORALLY DISINTEGRATING ORAL EVERY 6 HOURS PRN
Status: DISCONTINUED | OUTPATIENT
Start: 2022-11-14 | End: 2022-11-15 | Stop reason: HOSPADM

## 2022-11-14 RX ORDER — NALOXONE HYDROCHLORIDE 0.4 MG/ML
0.4 INJECTION, SOLUTION INTRAMUSCULAR; INTRAVENOUS; SUBCUTANEOUS
Status: DISCONTINUED | OUTPATIENT
Start: 2022-11-14 | End: 2022-11-15 | Stop reason: HOSPADM

## 2022-11-14 RX ORDER — FENTANYL CITRATE 50 UG/ML
25 INJECTION, SOLUTION INTRAMUSCULAR; INTRAVENOUS EVERY 5 MIN PRN
Status: DISCONTINUED | OUTPATIENT
Start: 2022-11-14 | End: 2022-11-14 | Stop reason: HOSPADM

## 2022-11-14 RX ORDER — FENTANYL CITRATE 50 UG/ML
INJECTION, SOLUTION INTRAMUSCULAR; INTRAVENOUS PRN
Status: DISCONTINUED | OUTPATIENT
Start: 2022-11-14 | End: 2022-11-14

## 2022-11-14 RX ORDER — HYDROMORPHONE HCL IN WATER/PF 6 MG/30 ML
0.4 PATIENT CONTROLLED ANALGESIA SYRINGE INTRAVENOUS
Status: DISCONTINUED | OUTPATIENT
Start: 2022-11-14 | End: 2022-11-15 | Stop reason: HOSPADM

## 2022-11-14 RX ORDER — NALOXONE HYDROCHLORIDE 0.4 MG/ML
0.4 INJECTION, SOLUTION INTRAMUSCULAR; INTRAVENOUS; SUBCUTANEOUS
Status: DISCONTINUED | OUTPATIENT
Start: 2022-11-14 | End: 2022-11-14 | Stop reason: HOSPADM

## 2022-11-14 RX ORDER — HALOPERIDOL 5 MG/ML
1 INJECTION INTRAMUSCULAR
Status: DISCONTINUED | OUTPATIENT
Start: 2022-11-14 | End: 2022-11-14 | Stop reason: HOSPADM

## 2022-11-14 RX ORDER — NALOXONE HYDROCHLORIDE 0.4 MG/ML
0.2 INJECTION, SOLUTION INTRAMUSCULAR; INTRAVENOUS; SUBCUTANEOUS
Status: DISCONTINUED | OUTPATIENT
Start: 2022-11-14 | End: 2022-11-14 | Stop reason: HOSPADM

## 2022-11-14 RX ORDER — KETOROLAC TROMETHAMINE 30 MG/ML
15 INJECTION, SOLUTION INTRAMUSCULAR; INTRAVENOUS EVERY 6 HOURS
Status: COMPLETED | OUTPATIENT
Start: 2022-11-14 | End: 2022-11-15

## 2022-11-14 RX ORDER — ONDANSETRON 2 MG/ML
INJECTION INTRAMUSCULAR; INTRAVENOUS PRN
Status: DISCONTINUED | OUTPATIENT
Start: 2022-11-14 | End: 2022-11-14

## 2022-11-14 RX ORDER — AMPICILLIN 2 G/1
2 INJECTION, POWDER, FOR SOLUTION INTRAVENOUS EVERY 6 HOURS
Status: DISCONTINUED | OUTPATIENT
Start: 2022-11-14 | End: 2022-11-14 | Stop reason: HOSPADM

## 2022-11-14 RX ORDER — OXYCODONE HYDROCHLORIDE 5 MG/1
5 TABLET ORAL EVERY 4 HOURS PRN
Status: DISCONTINUED | OUTPATIENT
Start: 2022-11-14 | End: 2022-11-14 | Stop reason: HOSPADM

## 2022-11-14 RX ORDER — AMOXICILLIN 250 MG
1 CAPSULE ORAL 2 TIMES DAILY
Status: DISCONTINUED | OUTPATIENT
Start: 2022-11-15 | End: 2022-11-15 | Stop reason: HOSPADM

## 2022-11-14 RX ORDER — NALOXONE HYDROCHLORIDE 0.4 MG/ML
0.2 INJECTION, SOLUTION INTRAMUSCULAR; INTRAVENOUS; SUBCUTANEOUS
Status: DISCONTINUED | OUTPATIENT
Start: 2022-11-14 | End: 2022-11-15 | Stop reason: HOSPADM

## 2022-11-14 RX ORDER — ONDANSETRON 4 MG/1
4 TABLET, ORALLY DISINTEGRATING ORAL EVERY 30 MIN PRN
Status: DISCONTINUED | OUTPATIENT
Start: 2022-11-14 | End: 2022-11-14 | Stop reason: HOSPADM

## 2022-11-14 RX ORDER — ONDANSETRON 2 MG/ML
4 INJECTION INTRAMUSCULAR; INTRAVENOUS EVERY 6 HOURS PRN
Status: DISCONTINUED | OUTPATIENT
Start: 2022-11-14 | End: 2022-11-15 | Stop reason: HOSPADM

## 2022-11-14 RX ORDER — GABAPENTIN 100 MG/1
100 CAPSULE ORAL 3 TIMES DAILY
Status: DISCONTINUED | OUTPATIENT
Start: 2022-11-14 | End: 2022-11-15 | Stop reason: HOSPADM

## 2022-11-14 RX ORDER — CALCIUM CARBONATE 500 MG/1
500 TABLET, CHEWABLE ORAL DAILY PRN
Status: DISCONTINUED | OUTPATIENT
Start: 2022-11-14 | End: 2022-11-15 | Stop reason: HOSPADM

## 2022-11-14 RX ORDER — SODIUM CHLORIDE, SODIUM LACTATE, POTASSIUM CHLORIDE, CALCIUM CHLORIDE 600; 310; 30; 20 MG/100ML; MG/100ML; MG/100ML; MG/100ML
INJECTION, SOLUTION INTRAVENOUS CONTINUOUS
Status: DISCONTINUED | OUTPATIENT
Start: 2022-11-14 | End: 2022-11-14 | Stop reason: HOSPADM

## 2022-11-14 RX ORDER — LIDOCAINE 40 MG/G
CREAM TOPICAL
Status: DISCONTINUED | OUTPATIENT
Start: 2022-11-14 | End: 2022-11-15 | Stop reason: HOSPADM

## 2022-11-14 RX ORDER — OXYCODONE HYDROCHLORIDE 5 MG/1
5 TABLET ORAL EVERY 4 HOURS PRN
Status: DISCONTINUED | OUTPATIENT
Start: 2022-11-14 | End: 2022-11-15 | Stop reason: HOSPADM

## 2022-11-14 RX ORDER — ACETAMINOPHEN 325 MG/1
650 TABLET ORAL EVERY 4 HOURS PRN
Status: DISCONTINUED | OUTPATIENT
Start: 2022-11-17 | End: 2022-11-15 | Stop reason: HOSPADM

## 2022-11-14 RX ORDER — HYDROMORPHONE HYDROCHLORIDE 1 MG/ML
0.2 INJECTION, SOLUTION INTRAMUSCULAR; INTRAVENOUS; SUBCUTANEOUS EVERY 5 MIN PRN
Status: DISCONTINUED | OUTPATIENT
Start: 2022-11-14 | End: 2022-11-14 | Stop reason: HOSPADM

## 2022-11-14 RX ORDER — ONDANSETRON 2 MG/ML
4 INJECTION INTRAMUSCULAR; INTRAVENOUS EVERY 30 MIN PRN
Status: DISCONTINUED | OUTPATIENT
Start: 2022-11-14 | End: 2022-11-14 | Stop reason: HOSPADM

## 2022-11-14 RX ORDER — BUPIVACAINE HYDROCHLORIDE 2.5 MG/ML
INJECTION, SOLUTION EPIDURAL; INFILTRATION; INTRACAUDAL PRN
Status: DISCONTINUED | OUTPATIENT
Start: 2022-11-14 | End: 2022-11-14 | Stop reason: HOSPADM

## 2022-11-14 RX ORDER — ACETAMINOPHEN 325 MG/1
975 TABLET ORAL EVERY 8 HOURS
Status: DISCONTINUED | OUTPATIENT
Start: 2022-11-14 | End: 2022-11-15 | Stop reason: HOSPADM

## 2022-11-14 RX ORDER — FENTANYL CITRATE 50 UG/ML
25 INJECTION, SOLUTION INTRAMUSCULAR; INTRAVENOUS
Status: DISCONTINUED | OUTPATIENT
Start: 2022-11-14 | End: 2022-11-14 | Stop reason: HOSPADM

## 2022-11-14 RX ADMIN — HYDROMORPHONE HYDROCHLORIDE 0.2 MG: 1 INJECTION, SOLUTION INTRAMUSCULAR; INTRAVENOUS; SUBCUTANEOUS at 12:54

## 2022-11-14 RX ADMIN — AMPICILLIN 2 G: 2 INJECTION, POWDER, FOR SOLUTION INTRAVENOUS at 08:05

## 2022-11-14 RX ADMIN — PROPOFOL 60 MG: 10 INJECTION, EMULSION INTRAVENOUS at 09:07

## 2022-11-14 RX ADMIN — HYDROMORPHONE HYDROCHLORIDE 0.4 MG: 0.2 INJECTION, SOLUTION INTRAMUSCULAR; INTRAVENOUS; SUBCUTANEOUS at 13:43

## 2022-11-14 RX ADMIN — GABAPENTIN 100 MG: 100 CAPSULE ORAL at 13:41

## 2022-11-14 RX ADMIN — FENTANYL CITRATE 25 MCG: 50 INJECTION, SOLUTION INTRAMUSCULAR; INTRAVENOUS at 09:00

## 2022-11-14 RX ADMIN — OXYCODONE HYDROCHLORIDE 5 MG: 5 TABLET ORAL at 13:41

## 2022-11-14 RX ADMIN — FENTANYL CITRATE 25 MCG: 50 INJECTION, SOLUTION INTRAMUSCULAR; INTRAVENOUS at 10:58

## 2022-11-14 RX ADMIN — ONDANSETRON 4 MG: 4 TABLET, ORALLY DISINTEGRATING ORAL at 19:22

## 2022-11-14 RX ADMIN — PHENYLEPHRINE HYDROCHLORIDE 100 MCG: 10 INJECTION INTRAVENOUS at 09:41

## 2022-11-14 RX ADMIN — PROPOFOL 100 MG: 10 INJECTION, EMULSION INTRAVENOUS at 07:47

## 2022-11-14 RX ADMIN — FENTANYL CITRATE 25 MCG: 50 INJECTION, SOLUTION INTRAMUSCULAR; INTRAVENOUS at 11:57

## 2022-11-14 RX ADMIN — LIDOCAINE HYDROCHLORIDE 60 MG: 20 INJECTION, SOLUTION INFILTRATION; PERINEURAL at 07:47

## 2022-11-14 RX ADMIN — PHENYLEPHRINE HYDROCHLORIDE 0.2 MCG/KG/MIN: 10 INJECTION INTRAVENOUS at 08:36

## 2022-11-14 RX ADMIN — FENTANYL CITRATE 25 MCG: 50 INJECTION, SOLUTION INTRAMUSCULAR; INTRAVENOUS at 08:20

## 2022-11-14 RX ADMIN — HYDROMORPHONE HYDROCHLORIDE 0.2 MG: 1 INJECTION, SOLUTION INTRAMUSCULAR; INTRAVENOUS; SUBCUTANEOUS at 13:07

## 2022-11-14 RX ADMIN — ACETAMINOPHEN 975 MG: 325 TABLET, FILM COATED ORAL at 21:51

## 2022-11-14 RX ADMIN — SUGAMMADEX 120 MG: 100 INJECTION, SOLUTION INTRAVENOUS at 10:58

## 2022-11-14 RX ADMIN — PHENYLEPHRINE HYDROCHLORIDE 50 MCG: 10 INJECTION INTRAVENOUS at 09:25

## 2022-11-14 RX ADMIN — FENTANYL CITRATE 25 MCG: 50 INJECTION, SOLUTION INTRAMUSCULAR; INTRAVENOUS at 12:32

## 2022-11-14 RX ADMIN — SODIUM CHLORIDE, POTASSIUM CHLORIDE, SODIUM LACTATE AND CALCIUM CHLORIDE: 600; 310; 30; 20 INJECTION, SOLUTION INTRAVENOUS at 07:38

## 2022-11-14 RX ADMIN — PROPOFOL 20 MG: 10 INJECTION, EMULSION INTRAVENOUS at 10:12

## 2022-11-14 RX ADMIN — FENTANYL CITRATE 25 MCG: 50 INJECTION, SOLUTION INTRAMUSCULAR; INTRAVENOUS at 11:40

## 2022-11-14 RX ADMIN — DEXAMETHASONE SODIUM PHOSPHATE 4 MG: 4 INJECTION, SOLUTION INTRA-ARTICULAR; INTRALESIONAL; INTRAMUSCULAR; INTRAVENOUS; SOFT TISSUE at 07:58

## 2022-11-14 RX ADMIN — ACETAMINOPHEN 975 MG: 325 TABLET, FILM COATED ORAL at 12:38

## 2022-11-14 RX ADMIN — KETOROLAC TROMETHAMINE 15 MG: 30 INJECTION, SOLUTION INTRAMUSCULAR at 19:14

## 2022-11-14 RX ADMIN — Medication 50 MG: at 07:47

## 2022-11-14 RX ADMIN — TRANEXAMIC ACID 1 G: 1 INJECTION, SOLUTION INTRAVENOUS at 09:10

## 2022-11-14 RX ADMIN — PHENYLEPHRINE HYDROCHLORIDE 100 MCG: 10 INJECTION INTRAVENOUS at 10:31

## 2022-11-14 RX ADMIN — SODIUM CHLORIDE, POTASSIUM CHLORIDE, SODIUM LACTATE AND CALCIUM CHLORIDE: 600; 310; 30; 20 INJECTION, SOLUTION INTRAVENOUS at 10:53

## 2022-11-14 RX ADMIN — PHENYLEPHRINE HYDROCHLORIDE 100 MCG: 10 INJECTION INTRAVENOUS at 08:10

## 2022-11-14 RX ADMIN — KETOROLAC TROMETHAMINE 15 MG: 30 INJECTION, SOLUTION INTRAMUSCULAR at 13:18

## 2022-11-14 RX ADMIN — SODIUM CHLORIDE, POTASSIUM CHLORIDE, SODIUM LACTATE AND CALCIUM CHLORIDE: 600; 310; 30; 20 INJECTION, SOLUTION INTRAVENOUS at 12:15

## 2022-11-14 RX ADMIN — FENTANYL CITRATE 50 MCG: 50 INJECTION, SOLUTION INTRAMUSCULAR; INTRAVENOUS at 07:47

## 2022-11-14 RX ADMIN — PHENYLEPHRINE HYDROCHLORIDE 100 MCG: 10 INJECTION INTRAVENOUS at 08:33

## 2022-11-14 RX ADMIN — GABAPENTIN 100 MG: 100 CAPSULE ORAL at 19:15

## 2022-11-14 RX ADMIN — MIDAZOLAM 2 MG: 1 INJECTION INTRAMUSCULAR; INTRAVENOUS at 07:38

## 2022-11-14 RX ADMIN — OXYBUTYNIN CHLORIDE 5 MG: 5 TABLET ORAL at 13:09

## 2022-11-14 RX ADMIN — PHENYLEPHRINE HYDROCHLORIDE 100 MCG: 10 INJECTION INTRAVENOUS at 08:25

## 2022-11-14 RX ADMIN — FENTANYL CITRATE 25 MCG: 50 INJECTION, SOLUTION INTRAMUSCULAR; INTRAVENOUS at 12:13

## 2022-11-14 RX ADMIN — ONDANSETRON 4 MG: 2 INJECTION INTRAMUSCULAR; INTRAVENOUS at 07:59

## 2022-11-14 RX ADMIN — PHENYLEPHRINE HYDROCHLORIDE 100 MCG: 10 INJECTION INTRAVENOUS at 10:22

## 2022-11-14 RX ADMIN — OXYCODONE HYDROCHLORIDE 5 MG: 5 TABLET ORAL at 18:04

## 2022-11-14 RX ADMIN — GENTAMICIN SULFATE 220 MG: 40 INJECTION, SOLUTION INTRAMUSCULAR; INTRAVENOUS at 06:58

## 2022-11-14 RX ADMIN — HYDROMORPHONE HYDROCHLORIDE 0.2 MG: 1 INJECTION, SOLUTION INTRAMUSCULAR; INTRAVENOUS; SUBCUTANEOUS at 12:53

## 2022-11-14 ASSESSMENT — ACTIVITIES OF DAILY LIVING (ADL)
DRESSING/BATHING_DIFFICULTY: NO
ADLS_ACUITY_SCORE: 18
ADLS_ACUITY_SCORE: 35
TOILETING_ISSUES: NO
ADLS_ACUITY_SCORE: 35
DIFFICULTY_EATING/SWALLOWING: NO
ADLS_ACUITY_SCORE: 35
CHANGE_IN_FUNCTIONAL_STATUS_SINCE_ONSET_OF_CURRENT_ILLNESS/INJURY: NO
WALKING_OR_CLIMBING_STAIRS_DIFFICULTY: NO
ADLS_ACUITY_SCORE: 18
WEAR_GLASSES_OR_BLIND: NO
ADLS_ACUITY_SCORE: 35
ADLS_ACUITY_SCORE: 35
CONCENTRATING,_REMEMBERING_OR_MAKING_DECISIONS_DIFFICULTY: NO
ADLS_ACUITY_SCORE: 18
DOING_ERRANDS_INDEPENDENTLY_DIFFICULTY: NO
FALL_HISTORY_WITHIN_LAST_SIX_MONTHS: NO
ADLS_ACUITY_SCORE: 35

## 2022-11-14 NOTE — ANESTHESIA CARE TRANSFER NOTE
Patient: Lizzy Wright    Procedure: Procedure(s):  Right percutaneous nephrolithotomy with Holmium Laser Lithotripsy Right ureteral stent placement       Diagnosis: Kidney stone [N20.0]  Diagnosis Additional Information: No value filed.    Anesthesia Type:   General     Note:    Oropharynx: oropharynx clear of all foreign objects and spontaneously breathing  Level of Consciousness: drowsy  Oxygen Supplementation: face mask  Level of Supplemental Oxygen (L/min / FiO2): 6  Independent Airway: airway patency satisfactory and stable  Dentition: dentition unchanged  Vital Signs Stable: post-procedure vital signs reviewed and stable  Report to RN Given: handoff report given  Patient transferred to: PACU  Comments: Patient transferred to PACU on oxygen. Patent airway and PIV. All VSS. Patient responsive to verbal cues,  at bedside. Report to PACU RN.    Handoff Report: Identifed the Patient, Identified the Reponsible Provider, Reviewed the pertinent medical history, Discussed the surgical course, Reviewed Intra-OP anesthesia mangement and issues during anesthesia, Set expectations for post-procedure period and Allowed opportunity for questions and acknowledgement of understanding      Vitals:  Vitals Value Taken Time   BP 97/63 11/14/22 1145   Temp 36.7  C (98.1  F) 11/14/22 1118   Pulse 94 11/14/22 1149   Resp 23 11/14/22 1149   SpO2 98 % 11/14/22 1149   Vitals shown include unvalidated device data.    Electronically Signed By: ROMI Bianchi CRNA  November 14, 2022  11:51 AM

## 2022-11-14 NOTE — LETTER
[facility] MED SURG  [address] [address]  [phone]    November 15, 2022    Re: Lizzy Wright  [address] [place] [address]  [phone] (home)     : 1980      To Whom It May Concern:      Lizzy Wright was hospitalized from 2022 until 11/15/2022 due to surgery.  She may return to work on 22 or earlier if she feels fit to do so and limited to light duty - lifting no greater than 20 pounds.        Sincerely,        Ed Escoto MD

## 2022-11-14 NOTE — OP NOTE
OPERATIVE REPORT    PREOPERATIVE DIAGNOSIS:  Kidney stone [N20.0]     POSTOPERATIVE DIAGNOSIS:  Same    PROCEDURES PERFORMED:   Cystoscopy  Placement of Right ureteral catheter   Right   Retrograde Pyelogram with interpretation of imaging  Percutaneous access to the Right kidney  Dilation of renal access tract  Percutaneous nephrolithotomy for stone >2 cm   Laser Lithotripsy Right  Antegrade ureteroscopy with stone basket extraction  Antegrade Right  Ureteral Stent placement     STAFF SURGEON: Deshaun Oconnell MD was present and participatory for the entire case.   RESIDENT(S): Cristian Kim MD  FELLOW: Blanca Yuan MD    ANESTHESIA: General    ESTIMATED BLOOD LOSS: 50 ml    DRAINS/TUBES:   Right  6 Macedonian x 24cm double-J ureteral stent without String   16 Mozambican guerin catheter    IV FLUIDS: Please see dictated anesthesia record  COMPLICATIONS: None.   SPECIMEN:   Right renal stone for analysis and culture    SIGNIFICANT FINDINGS:   -Right renal stone removed via Upper pole access supracostal  -Visually stone free at the end of the case.    BRIEF OPERATIVE INDICATIONS: Lizzy Wright who is a 42 year old female with diabetes who presented with bacteremia and found to have right staghorn calculus. She was counseled regarding available treatment options and associated risks.  she elected to proceed with percutaneous nephrolithotomy.  she is aware of the risks of surgery.    OPERATIVE DETAILS: After informed consent was obtained, the patient was taken back to the operating room. Anesthesia was induced and the patient was intubated. IV culture directed antibiotics were given and bilateral sequential compression devices were placed. A timeout was taken to ensure proper patient, placement and procedure.  The patient was then carefully placed in the split leg prone position onto the operating room table ensuring padding of shoulders and all pressure points. Patient was prepped and draped in usual fashion.   Cystoscopy  was started.  The urethra was open.  The bladder mucosa showed no evidence of any lesions or trabeculation.  There were no stones.  A guidewire was then placed up the Right  ureter and a 5 Kinyarwanda open ended catheter was advanced over the wire. A guerin catheter was placed.     imaging and retrograde pyelogram revealed  imaging demonstrates radio-opaque renal stone consistent with pre-operative imaging. Right retrograde pyelography demonstrates radio-opaque renal pelvic stone without obstruction. There is minimal hydronephrosis. The stone was extremely large, occupying nearly every calyx of the collecting system  Percutaneous access to the upper pole was undertaken using the bulls-eye technique.  A hydrophilic glidewire was passed through the obturator of the 18g access needle and navigated down the ureter using an angle tipped catheter.  The glidewire was then exchnged for a superstiff wire. An 8/10 coaxial dilator  was passed over the wire after a skin incision was made 1cm along the wire. A sensor wire was passed to use as a safety wire.  With 2 wires in place we then used a 24F balloon dilator over the Super Stiff guidewire and used this to dilate our percutaneous tract. This was monitored with fluoroscopy. The sheath was advanced into the calyx of access. At this point we deflated the balloon and removed the dilator. We then inserted the rigid nephroscope into the dilated tract and removed all accessible stones with the ultrasonic lithotrite.  We then switched to the flexible cystoscope to clear the remaining calyces. The lower and mid pole had some stones that we were unable to reach with the rigid scope and required laser lithotripsy with the 200 micron fiber at a setting of 1J and 15 hz.    We performed antegrade ureteroscopy with a flexible ureteroscope to remove stone fragments from the ureter with the 1.5F basket. We ensured that the ureter was patent with no evidence of residual ureteral calculi  or ureteral trauma.  We were able to pass the scope all the way to the bladder.  Right 6F 24 cm stent is inserted in antegrade fashion with good coil in kidney and bladder under fluoroscopic guidance.  We removed the Amplatz access sheath. There was minimal bleeding  We injected local anesthetic into both the wound. We placed a clean dressing over the wound and held several minutes or pressure ensuring there was no significant bleeding.  At this point, the patient was carefully placed back into the supine position, awakened from anesthesia and extubated. she was transferred to the PACU in stable condition. she tolerated the procedure well without complication.      PLAN:  Observation  Labs CBC/BMP in PACU and CBC/BMP in AM  Home Antibiotics cefuroxime 500 BID x 1 week  Imaging CT tonight  Follow-up depends on the imaging resutls  I, Deshaun Oconnell, was present and participatory for the entirety of the procedure

## 2022-11-14 NOTE — PROGRESS NOTES
I personally met with Lizzy Wright and discussed their current medical situation.  We reviewed the nature of planned surgery, the risks benefits and complications and treatment alternatives.  Shared decision made to proceed with right percutaneous nephrolithotomy.

## 2022-11-14 NOTE — INTERVAL H&P NOTE
"I have reviewed the surgical (or preoperative) H&P that is linked to this encounter, and examined the patient. There are no significant changes    Clinical Conditions Present on Arrival:  Clinically Significant Risk Factors Present on Admission                    # DMII: A1C = 8.1 % (Ref range: <5.7 %) within past 3 months  # Overweight: Estimated body mass index is 26.76 kg/m  as calculated from the following:    Height as of this encounter: 1.549 m (5' 0.98\").    Weight as of this encounter: 64.2 kg (141 lb 8.6 oz).       "

## 2022-11-14 NOTE — ANESTHESIA PREPROCEDURE EVALUATION
Anesthesia Pre-Procedure Evaluation    Patient: Lizzy Wright   MRN: [id number] : 1980        Procedure : Procedure(s):  Right percutaneous nephrolithotomy  with possible holmium laser lithotripsy          Past Medical History:   Diagnosis Date     Anemia      Diabetes (H)      Diabetes mellitus, type II (H)      H/O gestational diabetes mellitus, not currently pregnant 2013    Pt was started on insulin by HE DM educator on 10/3/13.  She is on NPH 12 units at bedtime and Humalog 1 unit per carb with meals.  She will NEED weekly BPP/NST until delivers at [facility]      H/O seasonal allergies      History of  2013    First c-sect was done in  in [facility] and so she has unknown scar on uterus.  She had repeat c-sect  by Dr Pineda (Jasmin LIPSCOMB) at [facility] and 13 at [facility] (had tubal ligation)      History of insulin dependent diabetes mellitus 2018      Past Surgical History:   Procedure Laterality Date      SECTION  2008 and       SECTION       TUBAL LIGATION       TUBAL LIGATION        Allergies   Allergen Reactions     Nkda [No Known Drug Allergies]       Social History     Tobacco Use     Smoking status: Never     Smokeless tobacco: Never   Substance Use Topics     Alcohol use: No      Wt Readings from Last 1 Encounters:   22 64.2 kg (141 lb 8.6 oz)        Anesthesia Evaluation   Pt has had prior anesthetic.     No history of anesthetic complications       ROS/MED HX  ENT/Pulmonary:  - neg pulmonary ROS     Neurologic:  - neg neurologic ROS     Cardiovascular:  - neg cardiovascular ROS     METS/Exercise Tolerance:     Hematologic:  - neg hematologic  ROS     Musculoskeletal:  - neg musculoskeletal ROS     GI/Hepatic:  - neg GI/hepatic ROS     Renal/Genitourinary:     (+) Nephrolithiasis ,     Endo:     (+) type II DM, Not using insulin,     Psychiatric/Substance Use:  - neg psychiatric ROS     Infectious Disease:  - neg infectious  disease ROS     Malignancy:  - neg malignancy ROS     Other:               OUTSIDE LABS:  CBC:   Lab Results   Component Value Date    WBC 8.3 11/08/2022    WBC 5.3 09/24/2022    HGB 12.1 11/08/2022    HGB 11.3 (L) 09/24/2022    HCT 37.0 11/08/2022    HCT 35.6 09/24/2022     11/08/2022     09/24/2022     BMP:   Lab Results   Component Value Date     11/08/2022     09/24/2022    POTASSIUM 3.5 11/08/2022    POTASSIUM 3.9 09/24/2022    CHLORIDE 102 11/08/2022    CHLORIDE 105 09/24/2022    CO2 22 11/08/2022    CO2 22 09/24/2022    BUN 9.2 11/08/2022    BUN 12.4 09/24/2022    CR 0.52 11/08/2022    CR 0.49 (L) 09/24/2022     (H) 11/14/2022     (H) 11/08/2022     COAGS:   Lab Results   Component Value Date    INR 1.04 06/21/2018     POC:   Lab Results   Component Value Date    HCG POSITIVE 05/06/2013     HEPATIC:   Lab Results   Component Value Date    ALBUMIN 5.0 11/08/2022    PROTTOTAL 8.6 (H) 11/08/2022    ALT 29 11/08/2022    AST 24 11/08/2022    ALKPHOS 74 11/08/2022    BILITOTAL 1.0 11/08/2022     OTHER:   Lab Results   Component Value Date    LACT 0.7 09/23/2022    A1C 8.1 (H) 09/21/2022    SABINE 9.6 11/08/2022       Anesthesia Plan    ASA Status:  2   NPO Status:  NPO Appropriate    Anesthesia Type: General.     - Airway: ETT   Induction: Intravenous.   Maintenance: Inhalation.        Consents    Anesthesia Plan(s) and associated risks, benefits, and realistic alternatives discussed. Questions answered and patient/representative(s) expressed understanding.     - Discussed: Risks, Benefits and Alternatives for BOTH SEDATION and the PROCEDURE were discussed     - Discussed with:  Patient         Postoperative Care    Pain management: IV analgesics, Oral pain medications.   PONV prophylaxis: Ondansetron (or other 5HT-3), Dexamethasone or Solumedrol     Comments:                Lor Hernandez MD

## 2022-11-14 NOTE — OR NURSING
PACU to Inpatient Nursing Handoff    Patient Lizzy Wright is a 42 year old female who speaks Tabatha.   Procedure Procedure(s):  Right percutaneous nephrolithotomy with Holmium Laser Lithotripsy Right ureteral stent placement   Surgeon(s) Primary: Deshaun Oconnell MD  Resident - Assisting: Cristian Kim MD  Fellow - Assisting: Blanca Yuan MD     Allergies   Allergen Reactions     Nkda [No Known Drug Allergies]        Isolation  [unfilled]     Past Medical History   has a past medical history of Anemia, Diabetes (H), Diabetes mellitus, type II (H), H/O gestational diabetes mellitus, not currently pregnant (2013), H/O seasonal allergies, History of  (2013), and History of insulin dependent diabetes mellitus (2018).    Anesthesia General   Dermatome Level     Preop Meds Not applicable   Nerve block Not applicable   Intraop Meds dexamethasone (Decadron)  fentanyl (Sublimaze): 125 mcg total  ondansetron (Zofran): last given at 0709  TXA 1 gm IV   Local Meds Yes   Antibiotics ampicillin (Omnipen) - last given at 0805  gentamicin (Garamycin) - last given at 0658     Pain Patient Currently in Pain: yes   PACU meds  acetaminophen (Tylenol): 975 mg (total dose) last given at 1238   fentanyl (Sublimaze): 100 mcg (total dose) last given at 1232   hydromorphone (Dilaudid): 0.8 mg (total dose) last given at 1343   ketorolac (Toradol): 15 mg last given at 1318  oxycodone (Roxicodone): 5 mg (total dose) last given at 1341   Ditropan 5mg at 1309   PCA / epidural No   Capnography     Telemetry ECG Rhythm: Sinus rhythm   Inpatient Telemetry Monitor Ordered? No        Labs Glucose Lab Results   Component Value Date     2022     2022    .0 2021       Hgb Lab Results   Component Value Date    HGB 9.9 2022    HGB 8.8 2021       INR Lab Results   Component Value Date    INR 1.04 2018      PACU Imaging Completed     Wound/Incision Incision/Surgical  Site 11/14/22 Right Flank (Active)   Incision Assessment UTV 11/14/22 1327   Closure ZAINAB 11/14/22 1327   Incision Drainage Amount None 11/14/22 1327   Dressing Intervention Clean, dry, intact 11/14/22 1327   Number of days: 0      CMS        Equipment Not applicable   Other LDA       IV Access Peripheral IV 11/14/22 Left;Posterior Hand (Active)   Site Assessment WDL 11/14/22 1327   Line Status Infusing 11/14/22 1327   Phlebitis Scale 0-->no symptoms 11/14/22 1327   Infiltration Scale 0 11/14/22 1327   Number of days: 0      Blood Products Not applicable EBL unknown mL   Intake/Output Date 11/14/22 0700 - 11/15/22 0659   Shift 0775-3713 5625-6514 5527-0365 24 Hour Total   INTAKE   P.O. 125   125   I.V. 1000   1000   IV Piggyback 8   8   Shift Total(mL/kg) 1133(17.65)   1133(17.65)   OUTPUT   Urine 400   400   Blood 50   50   Shift Total(mL/kg) 450(7.01)   450(7.01)   Weight (kg) 64.2 64.2 64.2 64.2      Drains / Bond Urethral Catheter 11/14/22 Double-lumen;Non-latex 16 fr (Active)   Tube Description Positional 11/14/22 1327   Collection Container Standard;Patent 11/14/22 1327   Securement Method Securing device (Describe) 11/14/22 1327   Rationale for Continued Use /GI/GYN Pelvic Procedure;Strict 1-2 Hour I&O 11/14/22 1327   Urine Output 400 mL 11/14/22 1327   Number of days: 0      Time of void PreOp Void Prior to Procedure: 0600 (11/14/22 0607)    PostOp      Diapered? No   Bladder Scan      mL (11/14/22 1308)  tolerating sips and water     Vitals    B/P: (!) 87/51  T: 97.9  F (36.6  C)    Temp src: Oral  P:  Pulse: 79 (11/14/22 1400)          R: 16  O2:  SpO2: 95 %    O2 Device: None (Room air) (11/14/22 0535)    Oxygen Delivery: 2 LPM (11/14/22 1145)         Family/support present son updated via telephone   Patient belongings  2 bags returned to patient   Patient transported on cart and air mat   DC meds/scripts (obs/outpt) Not applicable   Inpatient Pain Meds Released? Yes       Special  needs/considerations None   Tasks needing completion None       Myrtle Gould RN  ASCOM [id number]

## 2022-11-14 NOTE — PLAN OF CARE
"BP 93/72   Pulse 68   Temp 97.9  F (36.6  C) (Oral)   Resp 16   Ht 1.549 m (5' 0.98\")   Wt 64.2 kg (141 lb 8.6 oz)   LMP 11/14/2022 (Exact Date)   SpO2 99%   BMI 26.76 kg/m  .     A&O x 4. VSS ex previous low BP in PACU - BP stable now.  Primary language Tabatha - needs .  Baseline tingling in BUE per pt. Intermittent nausea w/ movement - zofran administered x 1.  LS clear. Denies SOB, chest pain.  Bond cath in place - patent, pink output. Straining urine each shift to check for kidney stones. No stones noted this shift.  Skin intact ex posterior right back incision - covered w/ gauze and tegaderm.  Assist x 1 / SBA w/ transfers. Gait steady.  BA on as a precaution. Pt verbalized understanding that she will call before getting up.  Pain rated 3-6/10 - managed w/ tylenol, oxycodone, and toradol.    Family at bedside during visiting hours.    Imaging recheck planned for 11/15 in AM.    Continue POC.  "

## 2022-11-14 NOTE — ANESTHESIA POSTPROCEDURE EVALUATION
Patient: Lizzy Wright    Procedure: Procedure(s):  Right percutaneous nephrolithotomy with Holmium Laser Lithotripsy Right ureteral stent placement       Anesthesia Type:  General    Note:  Disposition: Admission   Postop Pain Control: Uneventful            Sign Out: Well controlled pain   PONV: No   Neuro/Psych: Uneventful            Sign Out: Acceptable/Baseline neuro status   Airway/Respiratory: Uneventful            Sign Out: Acceptable/Baseline resp. status   CV/Hemodynamics: Uneventful            Sign Out: Acceptable CV status; No obvious hypovolemia; No obvious fluid overload   Other NRE: NONE   DID A NON-ROUTINE EVENT OCCUR? No           Last vitals:  Vitals Value Taken Time   BP 95/57 11/14/22 1230   Temp 36.6  C (97.9  F) 11/14/22 1200   Pulse 94 11/14/22 1238   Resp 25 11/14/22 1238   SpO2 98 % 11/14/22 1238   Vitals shown include unvalidated device data.    Electronically Signed By: Lor Hernandez MD  November 14, 2022  12:39 PM  
No

## 2022-11-14 NOTE — BRIEF OP NOTE
[facility]    Brief Operative Note    Pre-operative diagnosis: Kidney stone [N20.0]  Post-operative diagnosis Same as pre-operative diagnosis    Procedure: Procedure(s):  Right percutaneous nephrolithotomy with Holmium Laser Lithotripsy Right stent placement  Surgeon: Surgeon(s) and Role:     * Deshaun Oconnell MD - Primary     * Cristian Kim MD - Resident - Assisting     * Blanca Yuan MD - Fellow - Assisting  Anesthesia: General   Estimated Blood Loss: 50 mL from 11/14/2022  7:42 AM to 11/14/2022 11:16 AM      Drains:  16Fr indwelling guerin catheter  Specimens:   ID Type Source Tests Collected by Time Destination   A :  Calculus/Stone Kidney, Right STONE ANALYSIS, AEROBIC BACTERIAL CULTURE ROUTINE Deshaun Oconnell MD 11/14/2022 10:18 AM      Findings:   staghorn stone, kidney without stone at the end of the case, proximal curl in the renal pelvis and distal in the bladder .  Complications: None.  Implants:   Implant Name Type Inv. Item Serial No.  Lot No. LRB No. Used Action   STENT URETERAL PERCUFLEX PLUS [id number] - [id number] Stent STENT URETERAL PERCUFLEX PLUS [id number]  [employer] [place] [id number] Right 1 Implanted

## 2022-11-14 NOTE — ANESTHESIA PROCEDURE NOTES
Airway       Patient location during procedure: OR       Procedure Start/Stop Times: 11/14/2022 7:52 AM  Staff -        Anesthesiologist:  Lor Hernandez MD       CRNA: Zaida Gregg APRN CRNA       Performed By: CRNA  Consent for Airway        Urgency: elective  Indications and Patient Condition       Indications for airway management: tiffany-procedural       Induction type:intravenous       Mask difficulty assessment: 1 - vent by mask    Final Airway Details       Final airway type: endotracheal airway       Successful airway: ETT - single and Oral  Endotracheal Airway Details        ETT size (mm): 7.0       Cuffed: yes       Successful intubation technique: direct laryngoscopy       DL Blade Type: MAC 3       Grade View of Cords: 1       Adjucts: stylet       Position: Right       Measured from: gums/teeth       Secured at (cm): 21       Bite block used: Soft    Post intubation assessment        Placement verified by: capnometry, equal breath sounds and chest rise        Number of attempts at approach: 1       Number of other approaches attempted: 0       Secured with: silk tape       Ease of procedure: easy       Dentition: Intact and Unchanged    Medication(s) Administered   Medication Administration Time: 11/14/2022 7:52 AM

## 2022-11-15 ENCOUNTER — APPOINTMENT (OUTPATIENT)
Dept: CT IMAGING | Facility: CLINIC | Age: 42
End: 2022-11-15
Attending: UROLOGY
Payer: COMMERCIAL

## 2022-11-15 VITALS
TEMPERATURE: 97.8 F | BODY MASS INDEX: 26.72 KG/M2 | SYSTOLIC BLOOD PRESSURE: 105 MMHG | DIASTOLIC BLOOD PRESSURE: 65 MMHG | RESPIRATION RATE: 26 BRPM | WEIGHT: 141.54 LBS | OXYGEN SATURATION: 94 % | HEIGHT: 61 IN | HEART RATE: 90 BPM

## 2022-11-15 LAB
ANION GAP SERPL CALCULATED.3IONS-SCNC: 5 MMOL/L (ref 3–14)
BASOPHILS # BLD AUTO: 0 10E3/UL (ref 0–0.2)
BASOPHILS NFR BLD AUTO: 0 %
BUN SERPL-MCNC: 13 MG/DL (ref 7–30)
CALCIUM SERPL-MCNC: 8.6 MG/DL (ref 8.5–10.1)
CHLORIDE BLD-SCNC: 107 MMOL/L (ref 94–109)
CO2 SERPL-SCNC: 28 MMOL/L (ref 20–32)
CREAT SERPL-MCNC: 0.65 MG/DL (ref 0.52–1.04)
EOSINOPHIL # BLD AUTO: 0 10E3/UL (ref 0–0.7)
EOSINOPHIL NFR BLD AUTO: 0 %
ERYTHROCYTE [DISTWIDTH] IN BLOOD BY AUTOMATED COUNT: 15.5 % (ref 10–15)
GFR SERPL CREATININE-BSD FRML MDRD: >90 ML/MIN/1.73M2
GLUCOSE BLD-MCNC: 127 MG/DL (ref 70–99)
GLUCOSE BLDC GLUCOMTR-MCNC: 115 MG/DL (ref 70–99)
HCT VFR BLD AUTO: 26.8 % (ref 35–47)
HGB BLD-MCNC: 8.4 G/DL (ref 11.7–15.7)
IMM GRANULOCYTES # BLD: 0 10E3/UL
IMM GRANULOCYTES NFR BLD: 0 %
LYMPHOCYTES # BLD AUTO: 0.8 10E3/UL (ref 0.8–5.3)
LYMPHOCYTES NFR BLD AUTO: 12 %
MCH RBC QN AUTO: 26.3 PG (ref 26.5–33)
MCHC RBC AUTO-ENTMCNC: 31.3 G/DL (ref 31.5–36.5)
MCV RBC AUTO: 84 FL (ref 78–100)
MONOCYTES # BLD AUTO: 0.6 10E3/UL (ref 0–1.3)
MONOCYTES NFR BLD AUTO: 8 %
NEUTROPHILS # BLD AUTO: 5.7 10E3/UL (ref 1.6–8.3)
NEUTROPHILS NFR BLD AUTO: 80 %
NRBC # BLD AUTO: 0 10E3/UL
NRBC BLD AUTO-RTO: 0 /100
PLATELET # BLD AUTO: 172 10E3/UL (ref 150–450)
POTASSIUM BLD-SCNC: 3.5 MMOL/L (ref 3.4–5.3)
RBC # BLD AUTO: 3.19 10E6/UL (ref 3.8–5.2)
SODIUM SERPL-SCNC: 140 MMOL/L (ref 133–144)
WBC # BLD AUTO: 7.1 10E3/UL (ref 4–11)

## 2022-11-15 PROCEDURE — 85025 COMPLETE CBC W/AUTO DIFF WBC: CPT | Performed by: STUDENT IN AN ORGANIZED HEALTH CARE EDUCATION/TRAINING PROGRAM

## 2022-11-15 PROCEDURE — 250N000011 HC RX IP 250 OP 636: Performed by: UROLOGY

## 2022-11-15 PROCEDURE — 74176 CT ABD & PELVIS W/O CONTRAST: CPT | Mod: 26 | Performed by: RADIOLOGY

## 2022-11-15 PROCEDURE — 82310 ASSAY OF CALCIUM: CPT | Performed by: STUDENT IN AN ORGANIZED HEALTH CARE EDUCATION/TRAINING PROGRAM

## 2022-11-15 PROCEDURE — 258N000003 HC RX IP 258 OP 636: Performed by: UROLOGY

## 2022-11-15 PROCEDURE — 250N000013 HC RX MED GY IP 250 OP 250 PS 637: Performed by: STUDENT IN AN ORGANIZED HEALTH CARE EDUCATION/TRAINING PROGRAM

## 2022-11-15 PROCEDURE — 74176 CT ABD & PELVIS W/O CONTRAST: CPT

## 2022-11-15 PROCEDURE — 250N000011 HC RX IP 250 OP 636: Performed by: STUDENT IN AN ORGANIZED HEALTH CARE EDUCATION/TRAINING PROGRAM

## 2022-11-15 PROCEDURE — 36415 COLL VENOUS BLD VENIPUNCTURE: CPT | Performed by: STUDENT IN AN ORGANIZED HEALTH CARE EDUCATION/TRAINING PROGRAM

## 2022-11-15 RX ORDER — AMPICILLIN TRIHYDRATE 500 MG
500 CAPSULE ORAL EVERY 6 HOURS
Status: DISCONTINUED | OUTPATIENT
Start: 2022-11-15 | End: 2022-11-15 | Stop reason: HOSPADM

## 2022-11-15 RX ORDER — OXYCODONE HYDROCHLORIDE 5 MG/1
5 TABLET ORAL EVERY 6 HOURS PRN
Qty: 20 TABLET | Refills: 0 | Status: SHIPPED | OUTPATIENT
Start: 2022-11-15 | End: 2022-11-20

## 2022-11-15 RX ORDER — OXYCODONE HYDROCHLORIDE 5 MG/1
5 TABLET ORAL EVERY 4 HOURS PRN
Status: CANCELLED | OUTPATIENT
Start: 2022-11-15

## 2022-11-15 RX ORDER — SODIUM CHLORIDE, SODIUM LACTATE, POTASSIUM CHLORIDE, CALCIUM CHLORIDE 600; 310; 30; 20 MG/100ML; MG/100ML; MG/100ML; MG/100ML
INJECTION, SOLUTION INTRAVENOUS CONTINUOUS
Status: CANCELLED | OUTPATIENT
Start: 2022-11-15

## 2022-11-15 RX ORDER — HYDROMORPHONE HYDROCHLORIDE 1 MG/ML
0.2 INJECTION, SOLUTION INTRAMUSCULAR; INTRAVENOUS; SUBCUTANEOUS EVERY 5 MIN PRN
Status: CANCELLED | OUTPATIENT
Start: 2022-11-15

## 2022-11-15 RX ORDER — FENTANYL CITRATE 50 UG/ML
25 INJECTION, SOLUTION INTRAMUSCULAR; INTRAVENOUS EVERY 5 MIN PRN
Status: CANCELLED | OUTPATIENT
Start: 2022-11-15

## 2022-11-15 RX ORDER — CIPROFLOXACIN 500 MG/1
500 TABLET, FILM COATED ORAL 2 TIMES DAILY
Qty: 14 TABLET | Refills: 0 | Status: SHIPPED | OUTPATIENT
Start: 2022-11-15 | End: 2022-11-22

## 2022-11-15 RX ORDER — TAMSULOSIN HYDROCHLORIDE 0.4 MG/1
0.4 CAPSULE ORAL DAILY
Qty: 7 CAPSULE | Refills: 0 | Status: SHIPPED | OUTPATIENT
Start: 2022-11-15 | End: 2022-11-22

## 2022-11-15 RX ADMIN — AMPICILLIN 500 MG: 500 CAPSULE ORAL at 18:10

## 2022-11-15 RX ADMIN — POLYETHYLENE GLYCOL 3350 17 G: 17 POWDER, FOR SOLUTION ORAL at 08:17

## 2022-11-15 RX ADMIN — AMPICILLIN 500 MG: 500 CAPSULE ORAL at 06:28

## 2022-11-15 RX ADMIN — TAMSULOSIN HYDROCHLORIDE 0.4 MG: 0.4 CAPSULE ORAL at 08:15

## 2022-11-15 RX ADMIN — GABAPENTIN 100 MG: 100 CAPSULE ORAL at 15:02

## 2022-11-15 RX ADMIN — GENTAMICIN SULFATE 320 MG: 40 INJECTION, SOLUTION INTRAMUSCULAR; INTRAVENOUS at 08:35

## 2022-11-15 RX ADMIN — OXYCODONE HYDROCHLORIDE 5 MG: 5 TABLET ORAL at 06:31

## 2022-11-15 RX ADMIN — OXYCODONE HYDROCHLORIDE 5 MG: 5 TABLET ORAL at 18:11

## 2022-11-15 RX ADMIN — ACETAMINOPHEN 975 MG: 325 TABLET, FILM COATED ORAL at 12:38

## 2022-11-15 RX ADMIN — SENNOSIDES AND DOCUSATE SODIUM 1 TABLET: 50; 8.6 TABLET ORAL at 08:15

## 2022-11-15 RX ADMIN — HYDROMORPHONE HYDROCHLORIDE 0.4 MG: 0.2 INJECTION, SOLUTION INTRAMUSCULAR; INTRAVENOUS; SUBCUTANEOUS at 05:06

## 2022-11-15 RX ADMIN — SENNOSIDES AND DOCUSATE SODIUM 1 TABLET: 50; 8.6 TABLET ORAL at 00:41

## 2022-11-15 RX ADMIN — KETOROLAC TROMETHAMINE 15 MG: 30 INJECTION, SOLUTION INTRAMUSCULAR at 08:09

## 2022-11-15 RX ADMIN — AMPICILLIN 500 MG: 500 CAPSULE ORAL at 12:38

## 2022-11-15 RX ADMIN — ACETAMINOPHEN 975 MG: 325 TABLET, FILM COATED ORAL at 04:49

## 2022-11-15 RX ADMIN — KETOROLAC TROMETHAMINE 15 MG: 30 INJECTION, SOLUTION INTRAMUSCULAR at 00:51

## 2022-11-15 RX ADMIN — GABAPENTIN 100 MG: 100 CAPSULE ORAL at 08:15

## 2022-11-15 ASSESSMENT — ACTIVITIES OF DAILY LIVING (ADL)
ADLS_ACUITY_SCORE: 18

## 2022-11-15 NOTE — PROGRESS NOTES
"Urology  Progress Note    NAEO  Pain well controlled with oral and IV prns  Tolerating solid food - no n/v  Passing gas  Ambulating  Guerin in place    Exam  /76 (BP Location: Right arm, Patient Position: Semi-Thurston's, Cuff Size: Adult Regular)   Pulse 76   Temp 97.9  F (36.6  C) (Oral)   Resp 16   Ht 1.549 m (5' 0.98\")   Wt 64.2 kg (141 lb 8.6 oz)   LMP 11/14/2022 (Exact Date)   SpO2 99%   BMI 26.76 kg/m    No acute distress  Unlabored breathing  Abdomen soft,  appropriately tender, nondistended. Incisions c/d/i   Guerin with pink tinged urine in tubing      /800    Labs  Recent Labs   Lab Test 11/14/22  1206 11/08/22  0819 09/24/22  0525   WBC 11.0 8.3 5.3   HGB 9.9* 12.1 11.3*   CR 0.54 0.52 0.49*       AM labs pending    Assessment/Plan  42 year old y/o female POD#1 s/p right PCNL.     Changes today:  - Awaiting CT scan; if residual stone in right kidney will remained hospitalized and made NPO at midnight for ureteroscopy 11/16. If no stones present may discharge later today.     Neuro: Tylenol, prn oxy/dilaudid for pain control. Toradol   CV: KOBY  Pulm: IS while awake  FEN/GI: regular diet. Bowel regimen.  Endo: KOBY  : guerin in place  Heme/ID: Hgb pending.  Activity: Up ad yesika  PPx: SCDs.   Dispo: med/surg pending CT scan results    Seen and examined with the chief resident. Will discuss with Dr. Oconnell.    Cristian Kim MD  Urology Resident     Contacting the Urology Team     Please use the following job codes to reach the Urology Team. Note that you must use an in house phone and that job codes cannot receive text pages.     On weekdays, dial [phone] (or star-star-star 777 on the new [employer] telephones) then [phone] to reach the Adult Urology resident or PA on call    On weekdays, dial [phone] (or star-star-star 777 on the new [employer] telephones) then [phone] to reach the Pediatric Urology resident    On weeknights and weekends, dial [phone] (or star-star-star 777 on the new [employer] telephones) then [phone] to " reach the Urology resident on call (for both Adult and Pediatrics)

## 2022-11-15 NOTE — PLAN OF CARE
"/65 (BP Location: Right arm)   Pulse 90   Temp 97.8  F (36.6  C) (Oral)   Resp 26   Ht 1.549 m (5' 0.98\")   Wt 64.2 kg (141 lb 8.6 oz)   LMP 11/14/2022 (Exact Date)   SpO2 94%   BMI 26.76 kg/m      Patient is A&Ox4. Able to make needs known with jarrod .   SBA  LPIV SL  C/o 5/10 pain managed with scheduled medications.   Dressing CDI.   Patient had CT scan today.   Bond removed this shift. Patient due to void.    Plan is to discharge home later today.     Continue with POC.     "

## 2022-11-15 NOTE — UTILIZATION REVIEW
Admission Status; Secondary Review Determination     Under the authority of the Utilization Management Committee, the utilization review process indicated a secondary review on the above patient. The review outcome is based on review of the medical records, discussions with staff, and applying clinical experience noted on the date of the review.     (x) Outpatient Status with extended recovery is appropriate - This patient does not meet hospital inpatient criteria. If this patient's primary payer is Medicare and was admitted as an inpatient, Condition Code 44 should be used and patient status changed to outpatient recovery.     RATIONALE FOR DETERMINATION:    43 yo patient with nephrolithiasis (staghorn calculus) who presented for an elective right percutaneous nephrolithotomy with holmium laser lithotripsy and right stent placement.      Vital signs stable.  No fever.      Labs show normal WBC count.  Stone culture is growing proteus    There were no tiffany-procedure complications.  Discharge has been ordered today.      Recommend change to OUTPATIENT status for this procedure    I text paged Dr. Kim my recommend to change to outpatient status today.      No documented complications or unexpected recovery. Patient can be safely monitored for bleeding and recover in outpatient/extended recovery setting.   The severity of illness, intensity of service provided, expected LOS and risk for adverse outcome doesn't meet inpatient hospital admission.     The information on this document is developed by the utilization review team in order for the business office to ensure compliance. This only denotes the appropriateness of proper admission status and does not reflect the quality of care rendered.   The definitions of Inpatient Status and Observation Status used in making the determination above are those provided in the CMS Coverage Manual, Chapter 1 and Chapter 6, section 70.4.     Sincerely,     Vikas Small,  MD  Utilization Review   Physician Advisor   [facility]

## 2022-11-16 LAB
APPEARANCE STONE: NORMAL
BACTERIA SPEC CULT: ABNORMAL
COMPN STONE: NORMAL
SPECIMEN WT: 19 MG

## 2022-11-16 NOTE — DISCHARGE SUMMARY
Pt discharged home with family at 1830. Pt education provided with interpretation. Medications reviewed. All questions and concerns addressed. No further needs at this time.    Bladder scan complete w/ 37cc residual. Pt has voided twice since guerin removal. Per urology - OK to discharge with one PVR.

## 2022-11-17 ENCOUNTER — PRE VISIT (OUTPATIENT)
Dept: UROLOGY | Facility: CLINIC | Age: 42
End: 2022-11-17

## 2022-11-17 NOTE — TELEPHONE ENCOUNTER
Reason for visit: Cystoscopy    Dx/Hx/Sx: Kidney stone    Records/images: In EPIC    At rooming: right stent removal    Roland Thomas, EMT  November 17, 2022  8:40 AM

## 2022-11-22 ENCOUNTER — OFFICE VISIT (OUTPATIENT)
Dept: UROLOGY | Facility: CLINIC | Age: 42
End: 2022-11-22
Payer: COMMERCIAL

## 2022-11-22 DIAGNOSIS — B96.4 URINARY TRACT INFECTION DUE TO PROTEUS: Primary | ICD-10-CM

## 2022-11-22 DIAGNOSIS — N39.0 URINARY TRACT INFECTION DUE TO PROTEUS: Primary | ICD-10-CM

## 2022-11-22 PROCEDURE — 52310 CYSTOSCOPY AND TREATMENT: CPT | Mod: 58 | Performed by: UROLOGY

## 2022-11-22 RX ORDER — CIPROFLOXACIN 500 MG/1
500 TABLET, FILM COATED ORAL 2 TIMES DAILY
Qty: 14 TABLET | Refills: 0 | Status: SHIPPED | OUTPATIENT
Start: 2022-11-22 | End: 2023-01-26

## 2022-11-22 NOTE — PATIENT INSTRUCTIONS
Please schedule a 6-week follow up with Dr. Oconnell for renal ultrasound.     It was a pleasure meeting with you today.  Thank you for allowing me and my team the privilege of caring for you today.  YOU are the reason we are here, and I truly hope we provided you with the excellent service you deserve.  Please let us know if there is anything else we can do for you so that we can be sure you are leaving completely satisfied with your care experience.

## 2022-11-22 NOTE — LETTER
11/22/2022       RE: Lizzy Wright  [address] [place] [address]     Dear Colleague,    Thank you for referring your patient, Lizzy Wright, to the [facility] [place] [facility]. Please see a copy of my visit note below.    CYSTOSCOPY PROCEDURE NOTE    Reason for cystoscopy: Stent Removal  Brief History: 43 yo F s/p R PCNL, tiny residuals, tolerating stent well, on abx for proteus in urine    CYSTOSCOPY  After obtaining informed consent, the patient was prepped and draped in the standard sterile fashion.  The 15 Romansh flexible cystoscope was inserted through the urethral meatus.  The right ureteral stent was identified grasped and removed intact.    Assessment/Plan:  -Imaging in 6 weeks  -One more week abx    IDeshaun saw and evaluated this patient and agree with the plan as stated above.  I personally performed all listed procedures.     EMT" states' nausea, vomiting" Zofran 4 mg IVP administered PTA

## 2022-11-28 NOTE — PROGRESS NOTES
CYSTOSCOPY PROCEDURE NOTE    Reason for cystoscopy: Stent Removal  Brief History: 41 yo F s/p R PCNL, tiny residuals, tolerating stent well, on abx for proteus in urine    CYSTOSCOPY  After obtaining informed consent, the patient was prepped and draped in the standard sterile fashion.  The 15 Romansh flexible cystoscope was inserted through the urethral meatus.  The right ureteral stent was identified grasped and removed intact.    Assessment/Plan:  -Imaging in 6 weeks  -One more week abx    IDeshaun saw and evaluated this patient and agree with the plan as stated above.  I personally performed all listed procedures.

## 2022-12-16 ENCOUNTER — OFFICE VISIT (OUTPATIENT)
Dept: FAMILY MEDICINE | Facility: CLINIC | Age: 42
End: 2022-12-16
Payer: COMMERCIAL

## 2022-12-16 VITALS
HEART RATE: 75 BPM | OXYGEN SATURATION: 98 % | SYSTOLIC BLOOD PRESSURE: 129 MMHG | WEIGHT: 141 LBS | RESPIRATION RATE: 18 BRPM | DIASTOLIC BLOOD PRESSURE: 90 MMHG | HEIGHT: 61 IN | TEMPERATURE: 97.7 F | BODY MASS INDEX: 26.62 KG/M2

## 2022-12-16 DIAGNOSIS — R80.9 DIABETES MELLITUS WITH PROTEINURIA (H): Primary | ICD-10-CM

## 2022-12-16 DIAGNOSIS — N20.0 NEPHROLITHIASIS: ICD-10-CM

## 2022-12-16 DIAGNOSIS — E78.5 HYPERLIPIDEMIA LDL GOAL <70: ICD-10-CM

## 2022-12-16 DIAGNOSIS — I10 DIASTOLIC HYPERTENSION: ICD-10-CM

## 2022-12-16 DIAGNOSIS — E11.29 DIABETES MELLITUS WITH PROTEINURIA (H): Primary | ICD-10-CM

## 2022-12-16 LAB — HBA1C MFR BLD: 7.1 % (ref 0–5.6)

## 2022-12-16 PROCEDURE — 83036 HEMOGLOBIN GLYCOSYLATED A1C: CPT

## 2022-12-16 PROCEDURE — 99214 OFFICE O/P EST MOD 30 MIN: CPT | Mod: 24

## 2022-12-16 PROCEDURE — 99207 PR FOOT EXAM NO CHARGE: CPT

## 2022-12-16 PROCEDURE — 36415 COLL VENOUS BLD VENIPUNCTURE: CPT

## 2022-12-16 RX ORDER — LANCETS
EACH MISCELLANEOUS
Qty: 100 EACH | Refills: 3 | Status: SHIPPED | OUTPATIENT
Start: 2022-12-16

## 2022-12-16 RX ORDER — LISINOPRIL 5 MG/1
5 TABLET ORAL DAILY
Qty: 90 TABLET | Refills: 3 | Status: SHIPPED | OUTPATIENT
Start: 2022-12-16 | End: 2024-01-24

## 2022-12-16 RX ORDER — DAPAGLIFLOZIN 5 MG/1
5 TABLET, FILM COATED ORAL DAILY
Qty: 90 TABLET | Refills: 3 | Status: SHIPPED | OUTPATIENT
Start: 2022-12-16 | End: 2024-01-24

## 2022-12-16 RX ORDER — ATORVASTATIN CALCIUM 20 MG/1
20 TABLET, FILM COATED ORAL DAILY
Qty: 90 TABLET | Refills: 0 | Status: SHIPPED | OUTPATIENT
Start: 2022-12-16 | End: 2023-04-24

## 2022-12-16 NOTE — LETTER
December 16, 2022      Lizzy N Adriana  [address] [place] [address]        Hi Ball,    Your A1c is 7.1. This is great new! Please call the clinic with any questions.       Resulted Orders   Hemoglobin A1c   Result Value Ref Range    Hemoglobin A1C 7.1 (H) 0.0 - 5.6 %      Comment:      Normal <5.7%   Prediabetes 5.7-6.4%    Diabetes 6.5% or higher     Note: Adopted from ADA consensus guidelines.       If you have any questions or concerns, please call the clinic at the number listed above.       Sincerely,      Sona Barn MD

## 2022-12-16 NOTE — PATIENT INSTRUCTIONS
Thank you for coming to see me today in clinic!    Today we discussed:  - Diabetes care  Continue taking her metformin (4 pills daily)  Restart your Farxiga  Start lisinopril daily and atorvastatin daily  Your glucose monitoring supply has been sent to the pharmacy    - Follow-up in 6 months to recheck your cholesterol    If you have any further questions, please call the clinic!    Have a wonderful rest of your day!

## 2022-12-16 NOTE — PROGRESS NOTES
"Preceptor attestation:  Vital signs reviewed: BP (!) 129/90   Pulse 75   Temp 97.7  F (36.5  C) (Tympanic)   Resp 18   Ht 1.543 m (5' 0.75\")   Wt 64 kg (141 lb)   LMP 12/13/2022 (Exact Date)   SpO2 98%   BMI 26.86 kg/m      Patient seen, evaluated, and discussed with the resident.  I have verified the content of the note, which accurately reflects my assessment of the patient and the plan of care.    Supervising physician: Lulu Wilkins MD  [facility]    "

## 2022-12-16 NOTE — PROGRESS NOTES
[facility] Visit    Assessment & Plan   Lizzy Wright is a 42 year old female with the past medical history of nephrolithiasis, type II diabetes, and hepatic steatosis. She presents to [facility] for diabetes follow-up.    Diabetes mellitus with proteinuria (H)  Hyperlipidemia LDL goal less than 70  Diastolic hypertension  Patient's last A1c on 9/21/2022 was 8.1.  She has longstanding history of microalbuminuria. Patient taking her metformin as prescribed. She has ran out of her farxiga and glucometer testing strips. She has not been checking her blood sugars for the last 2 months. Blood pressure was slightly high today with a diastolic of 90 measured twice. Discussion of starting low-dose lisinopril was completed with patient, and she agrees to taking this medication in order to protect her kidneys more.  Discussion of starting statin for increased LDL of 153 was discussed.  Patient agrees to starting medication today. Will recheck cholesterol level in 6 weeks.  - blood glucose (NO BRAND SPECIFIED) test strip; Use to test blood sugar 2 times daily or as directed. To accompany: Blood Glucose Monitor Brands: per insurance.  - thin (NO BRAND SPECIFIED) lancets; Use with lanceting device. Use to test 2 times per day. To accompany: Blood Glucose Monitor Brands: per insurance.  - dapagliflozin (FARXIGA) 5 MG TABS tablet; Take 1 tablet (5 mg) by mouth daily  - [place] FOOT EXAM NO CHARGE  - lisinopril (ZESTRIL) 5 MG tablet; Take 1 tablet (5 mg) by mouth daily  - atorvastatin (LIPITOR) 20 MG tablet; Take 1 tablet (20 mg) by mouth daily  - Hemoglobin A1c  - Patient encouraged to exercise 5 days a week for 30 minutes a day.    Right-sided nephrolithiasis  Follow-up with nephrology for right-sided anterior lower abdominal swelling with aching feeling.  Patient states she is supposed to follow-up in 5 weeks from stent removal.    Return in about 6 weeks (around 1/27/2023) for cholesterol and  "BP.        Patient was staffed with supervising physician, Dr. Lulu Wilkins.    Sona Bran MD, PGY2  [facility]      Subjective   Lizzy Wright is a 42 year old female with the past medical history of nephrolithiasis, type II diabetes, and hepatic steatosis. She presents to [facility] for diabetes follow-up.    HPI  Patient hospitalized on 11/14/2022 for right nephrolithiasis and staghorn calculus. Stent was placed and removed. Patient following with nephrology. States she needs to make a follow-up appointment for 5 weeks. She has noted swelling and an right lower abdominal aching since surgery.    Not been checking blood sugars for 1-2 months due to running out of testing strips. She states her blood sugars previously ran between 110-150. She was testing in the morning before eating and in the evening. Lowest was in the 90s. She denies diaphoresis, dizziness, and fatigue when in the 90s. She has not been taking her dapagliflozin due to running out. She is taking her metformin 2000mg daily.    Review of Systems   - Remaining 10 point system is negative other than what is noted in the HPI.    Objective   BP (!) 129/90   Pulse 75   Temp 97.7  F (36.5  C) (Tympanic)   Resp 18   Ht 1.543 m (5' 0.75\")   Wt 64 kg (141 lb)   LMP 12/13/2022 (Exact Date)   SpO2 98%   BMI 26.86 kg/m    Body mass index is 26.86 kg/m .    General appearance: alert, in no distress, cooperative  Eyes: conjunctivae/corneas clear  Ears: hearing grossly intact  Lung: speaking in full sentences, no wheezing, coughing, or use of accessory muscles  Chest wall: no tenderness  Abdomen: soft, asymmetrical appearing abdomen as right appearing more full or edematous compared to the left, round and fluctuating mass palpated which fluctuated from firm to soft, non-tender/light tenderness to the palpation, no masses or organomegaly  Extremities: warm, dry, no cyanosis  Diabetic foot exam: sensation intact " in all dermatomes of bilateral feet  Skin: normal color, texture, and turgor. No rashes or lesions  Neurologic: ambulatory, spontaneously moving upper and lower extremities without pain  Psychologic: appropriate mood     The 10-year ASCVD risk score (Patrice RODRÍGUEZ, et al., 2019) is: 2.3%    Values used to calculate the score:      Age: 42 years      Sex: Female      Is Non- : No      Diabetic: Yes      Tobacco smoker: No      Systolic Blood Pressure: 129 mmHg      Is BP treated: No      HDL Cholesterol: 42 mg/dL      Total Cholesterol: 220 mg/dL    ----- Service Performed and Documented by Resident or Fellow ------

## 2023-01-26 ENCOUNTER — OFFICE VISIT (OUTPATIENT)
Dept: PHARMACY | Facility: CLINIC | Age: 43
End: 2023-01-26
Payer: COMMERCIAL

## 2023-01-26 ENCOUNTER — OFFICE VISIT (OUTPATIENT)
Dept: FAMILY MEDICINE | Facility: CLINIC | Age: 43
End: 2023-01-26
Payer: COMMERCIAL

## 2023-01-26 VITALS
TEMPERATURE: 98.4 F | RESPIRATION RATE: 16 BRPM | HEART RATE: 80 BPM | DIASTOLIC BLOOD PRESSURE: 81 MMHG | WEIGHT: 143.2 LBS | BODY MASS INDEX: 27.03 KG/M2 | SYSTOLIC BLOOD PRESSURE: 112 MMHG | HEIGHT: 61 IN | OXYGEN SATURATION: 99 %

## 2023-01-26 DIAGNOSIS — I10 DIASTOLIC HYPERTENSION: ICD-10-CM

## 2023-01-26 DIAGNOSIS — D50.8 OTHER IRON DEFICIENCY ANEMIA: ICD-10-CM

## 2023-01-26 DIAGNOSIS — E11.29 DIABETES MELLITUS WITH PROTEINURIA (H): Primary | ICD-10-CM

## 2023-01-26 DIAGNOSIS — K59.00 CONSTIPATION, UNSPECIFIED CONSTIPATION TYPE: ICD-10-CM

## 2023-01-26 DIAGNOSIS — D50.9 IRON DEFICIENCY ANEMIA, UNSPECIFIED IRON DEFICIENCY ANEMIA TYPE: ICD-10-CM

## 2023-01-26 DIAGNOSIS — E78.5 HYPERLIPIDEMIA LDL GOAL <70: ICD-10-CM

## 2023-01-26 DIAGNOSIS — E11.29 TYPE 2 DIABETES MELLITUS WITH MICROALBUMINURIA, WITHOUT LONG-TERM CURRENT USE OF INSULIN (H): Primary | ICD-10-CM

## 2023-01-26 DIAGNOSIS — R80.9 TYPE 2 DIABETES MELLITUS WITH MICROALBUMINURIA, WITHOUT LONG-TERM CURRENT USE OF INSULIN (H): Primary | ICD-10-CM

## 2023-01-26 DIAGNOSIS — E78.2 MIXED HYPERLIPIDEMIA: ICD-10-CM

## 2023-01-26 DIAGNOSIS — R80.9 DIABETES MELLITUS WITH PROTEINURIA (H): Primary | ICD-10-CM

## 2023-01-26 LAB
ANION GAP SERPL CALCULATED.3IONS-SCNC: 15 MMOL/L (ref 7–15)
BUN SERPL-MCNC: 13.2 MG/DL (ref 6–20)
CALCIUM SERPL-MCNC: 9.1 MG/DL (ref 8.6–10)
CHLORIDE SERPL-SCNC: 105 MMOL/L (ref 98–107)
CHOLEST SERPL-MCNC: 164 MG/DL
CREAT SERPL-MCNC: 0.54 MG/DL (ref 0.51–0.95)
DEPRECATED HCO3 PLAS-SCNC: 20 MMOL/L (ref 22–29)
FERRITIN SERPL-MCNC: 4 NG/ML (ref 6–175)
GFR SERPL CREATININE-BSD FRML MDRD: >90 ML/MIN/1.73M2
GLUCOSE SERPL-MCNC: 135 MG/DL (ref 70–99)
HDLC SERPL-MCNC: 39 MG/DL
HGB BLD-MCNC: 7.9 G/DL (ref 11.7–15.7)
HOLD SPECIMEN: NORMAL
IRON BINDING CAPACITY (ROCHE): 420 UG/DL (ref 240–430)
IRON SATN MFR SERPL: 2 % (ref 15–46)
IRON SERPL-MCNC: 9 UG/DL (ref 37–145)
LDLC SERPL CALC-MCNC: 71 MG/DL
NONHDLC SERPL-MCNC: 125 MG/DL
POTASSIUM SERPL-SCNC: 3.9 MMOL/L (ref 3.4–5.3)
SODIUM SERPL-SCNC: 140 MMOL/L (ref 136–145)
TRIGL SERPL-MCNC: 272 MG/DL

## 2023-01-26 PROCEDURE — 99607 MTMS BY PHARM ADDL 15 MIN: CPT

## 2023-01-26 PROCEDURE — 99214 OFFICE O/P EST MOD 30 MIN: CPT | Mod: GC

## 2023-01-26 PROCEDURE — 80048 BASIC METABOLIC PNL TOTAL CA: CPT

## 2023-01-26 PROCEDURE — 83550 IRON BINDING TEST: CPT

## 2023-01-26 PROCEDURE — 82728 ASSAY OF FERRITIN: CPT

## 2023-01-26 PROCEDURE — 99605 MTMS BY PHARM NP 15 MIN: CPT

## 2023-01-26 PROCEDURE — 80061 LIPID PANEL: CPT

## 2023-01-26 PROCEDURE — 36415 COLL VENOUS BLD VENIPUNCTURE: CPT

## 2023-01-26 PROCEDURE — 83540 ASSAY OF IRON: CPT

## 2023-01-26 PROCEDURE — 85018 HEMOGLOBIN: CPT

## 2023-01-26 NOTE — PROGRESS NOTES
[facility] Visit    Assessment & Plan   Lizzy Wright is a 42 year old female with the past medical history of nephrolithiasis, anemia, and diabetes type 2. She presents to [facility] for follow-up of type 2 diabetes mellitus treatment and new medications (lisinopril 5 mg and atorvastatin 20mg).    Type 2 diabetes mellitus with microalbuminuria, without long-term current use of insulin (H)  Patient's last A1c on 12/16/2022 was 7.1.  She has longstanding history of microalbuminuria.  She is taking metformin 2000 mg daily and dapagliflozin.  She states she had 1 hypoglycemic event in the last 3 months and now hyperglycemic events.  She was started on lisinopril and atorvastatin for additional protection of the kidneys and heart.  - Basic metabolic panel; Future    Hyperlipidemia LDL goal <70  Patient has been on atorvastatin 20 mg for about 5 weeks. Will recheck levels.  - Lipid Profile; Future    Diastolic hypertension  Patient started on lisinopril 5 mg at last appointment. She is tolerating medication well.  - Basic metabolic panel; Future    Iron deficiency anemia, unspecified iron deficiency anemia type  Patient has history of iron deficiency.   - Hemoglobin with Reflex to Iron Studies; Future    Return in about 2 months (around 3/26/2023) for DM follow-up.        Patient was staffed with supervising physician, Dr. Valencia.    Sona Bran MD, PGY2  [facility]      Subjective   Lizzy Wright is a 42 year old female with the past medical history of nephrolithiasis, anemia, and diabetes type 2. She presents to [facility] for follow-up of type 2 diabetes mellitus treatment and new medications (lisinopril 5 mg and atorvastatin 20mg).    HPI  Patient last seen on 12/16/2022. A1c of 7.1. On metformin and dapafliflozin. Started on lisinopril 5 mg for diastolic blood pressure of 90 and atorvastatin 20 mg for increased cholesterol.  ASCVD score of  "2.3%.    Misses 1-2 days of medication a week. Does not use a pill box. She manages her own medications.    Blood sugars are checked once a day and in the morning. They are a little over 100. Sometimes if she eats and then takes her blood sugars, they are around 130.    Felt shaky and sweaty once in the last 3 months. Ate a sweet snack and felt much better.   No polyuria or polydipsia recently.     Will have some acid reflux after taking metformin. Will drink a glass of cold water with improvement.    Review of Systems   - Remaining 10 point system is negative other than what is noted in the HPI.    Objective   /81 (BP Location: Left arm, Patient Position: Sitting, Cuff Size: Adult Regular)   Pulse 80   Temp 98.4  F (36.9  C) (Oral)   Resp 16   Ht 1.549 m (5' 1\")   Wt 65 kg (143 lb 3.2 oz)   LMP 01/13/2023 (Approximate)   SpO2 99%   BMI 27.06 kg/m    Body mass index is 27.06 kg/m .    General appearance: alert, in no distress, cooperative  Eyes: conjunctivae/corneas clear  Ears: hearing grossly intact  Lung: clear to auscultation bilaterally, no wheezing, coughing, or use of accessory muscles  Heart: regular rate and rhythm, S1, S2 normal, no murmur, click, rub, or gallop  Abdomen: limited by obesity, soft, non-tender  Neurologic: ambulatory, spontaneously moving upper and lower extremities without pain  Psychologic: appropriate mood     ----- Service Performed and Documented by Resident or Fellow ------        "

## 2023-01-26 NOTE — PATIENT INSTRUCTIONS
Medication List            Accurate as of January 26, 2023  8:50 AM. If you have any questions, ask your nurse or doctor.                CONTINUE taking these medications          Morning Afternoon Evening Bedtime    acetaminophen 325 MG tablet  Also known as: TYLENOL  Take 1-2 tablets (325-650 mg) by mouth every 6 hours as needed for mild pain                     atorvastatin 20 MG tablet  Also known as: LIPITOR  Take 1 tablet (20 mg) by mouth daily                     dapagliflozin 5 MG Tabs tablet  Also known as: FARXIGA  Take 1 tablet (5 mg) by mouth daily                     ferrous sulfate 325 (65 Fe) MG tablet  Also known as: FEROSUL  Take 1 tablet (325 mg) by mouth 2 times daily (with meals)                     lisinopril 5 MG tablet  Also known as: ZESTRIL  Take 1 tablet (5 mg) by mouth daily                     metFORMIN 500 MG 24 hr tablet  Also known as: GLUCOPHAGE XR  Take 4 tablets daily with food         senna-docusate 8.6-50 MG tablet  Also known as: SENOKOT-S/PERICOLACE  Take 1 tablet by mouth daily as needed

## 2023-01-26 NOTE — PATIENT INSTRUCTIONS
Thank you for coming to see me today in clinic!    Today we discussed:  - Diabetes: continue your medication as prescribed.  Follow-up in about 2 months for DM check  - We will check labs today for lipids, kidney, and blood levels.    If you have any further questions, please call the clinic!    Have a wonderful rest of your day!

## 2023-01-26 NOTE — PROGRESS NOTES
I have verified the content of the note, which accurately reflects my assessment of the patient and the plan of care.   Margoth Conn, [facility], PharmD

## 2023-01-26 NOTE — Clinical Note
FYI only - pharmacy department requires that I route this note to you.  We discussed in person today. Thanks!  Heaven Britton, Pharm.D., MPH MTM Pharmacist Resident

## 2023-01-26 NOTE — PROGRESS NOTES
Medication Therapy Management (MTM) Encounter    ASSESSMENT:                            Medication Adherence/Access: Encouraged patient to come back in 2 weeks with medications and glucometer to go over medications in detail. Education was provided today on how to obtain refills of medications from her pharmacy when she is running low. Provided a list of her current medications for the patient to use as reference in ensuring she is taking all medications that are prescribed for her.     1. Diabetes mellitus with proteinuria (H)  A1c last month was nearly meeting goal of <7%. She is on max dose metformin and is also prescribed an SGLT2i but unclear if patient is taking this medication or lisinopril, see adherence below. Recommend continuation of currently prescribed medications and to take metformin with a meal to help reduce heartburn/GI upset. She is appropriately prescribed a statin and ACEi/ARB. Encouraged patient to bring in glucometer and all medications to next visit for further assessment of diabetes control.     2. Mixed hyperlipidemia  Recommend rechecking lipid panel 6-8 weeks after statin was initiated.    3. Other iron deficiency anemia  Hemoglobin down trending, confirmed with patient she is taking 2 tablets of ferrous sulfate once daily, this is okay to continue even though it is prescribed 1 tablet twice daily . After discussion with Dr. Bran, patient may benefit from infusion replacement. Will have patient continue with oral supplementation for now and follow closely with PCP for further recommendations.     4. Constipation, unspecified constipation type  Patient would benefit from restarting senna-docusate once daily as needed. Instructed patient on how to obtain refills from pharmacy.       PLAN:                            1. RESTART Senna-docusate 8.6-50mg once daily as needed  2. Please make sure you are taking the following medications:   Atorvastatin 20mg once daily   Dapagliflozin (Farxiga)  5mg once daily   Ferrous sulfate 2 tablets once daily   Lisinopril 5mg once daily   Metformin 500mg 4 tablets once daily with food   Senna-docusate 8.6-50mg once daily as needed    Medications/Disease States to be addressed at future visit:   - Ensure she is taking both Farxiga and lisinopril   - Reassess constipation after restarting senna docusate      Follow-up: 2 weeks with PharmD, 3/28 with PCP    SUBJECTIVE/OBJECTIVE:                          Lizzy Wright is a 42 year old female seen for an initial visit. She was last seen by MTM in June of 2022. Patient seen with Tabatha . Patient is here today with in-person Tabatha , Anju Reddy.      Reason for visit: Medication Review - adherence focus.    Allergies/ADRs: Reviewed in chart  Past Medical History: Reviewed in chart  Tobacco: She reports that she has never smoked. She has never used smokeless tobacco.    Medication Adherence/Access: Patient takes medications once daily directly out of the prescription vials.  She does not recall the exact names or dosing of her medications and did not bring them with her today. She also does not have her glucometer with her.    - She reports taking 4 medications (metformin, cholesterol, iron, and one for kidney protection) although 5 are prescribed and can't remember if she is taking Farxiga or lisinopril, but states she is not taking one of them. She is confident she is taking metformin, cholesterol medication, her iron supplement and then one other one for kidney protection (unclear wheter Farxiga or lisinopril)    Diabetes Type 2: metformin 2000mg once daily and is prescribed Farxiga 5mg one daily but unclear if she is taking this (see adherence above)  - She states that after taking metformin she will have a little bit of heartburn but drinking water helps. She is not taking her metformin with food.   - Blood glucose monitoring: traditional meter (not with her today) once daily in the morning, but will  occasionally check after eating.     Patient Reported Range 90s-150s   Symptoms of low blood sugar? shaky, dizzy, sweaty.  Frequency of hypoglycemia? Once in the last 3 months (when she didn't eat before going to work); appropriately will correct by eating a piece of candy   Recent symptoms of high blood sugar? malaise  Last eye exam was: due  Last foot exam was: 12/2022  Aspirin: no, not indicated due to age  ACEi/ARB: prescribed lisinopril 5mg but patient states she does not think she is taking this (see adherence above)  Statin: atorvastatin 20mg  Lab Results   Component Value Date    A1C 7.1 12/16/2022    A1C 8.1 09/21/2022    A1C 7.2 06/23/2022     Creatinine   Date Value Ref Range Status   11/15/2022 0.65 0.52 - 1.04 mg/dL Final   04/12/2021 0.5 (L) 0.5 - 1.0 mg/dL Final     Hyperlipidemia: atorvastatin 20mg   - Denies myalgias or muscle cramps   The 10-year ASCVD risk score (Patrice RODRÍGUEZ, et al., 2019) is: 2.4%    Values used to calculate the score:      Age: 42 years      Sex: Female      Is Non- : No      Diabetic: Yes      Tobacco smoker: No      Systolic Blood Pressure: 112 mmHg      Is BP treated: Yes      HDL Cholesterol: 42 mg/dL      Total Cholesterol: 220 mg/dL  Recent Labs   Lab Test 11/08/22  0819 06/23/22  0950 03/23/21  0952 05/19/16  1038   CHOL 220* 129 183.8 181.4   HDL 42* 31* 43.5 42.7   * 59 107 101   TRIG 126 194* 168.6* 188.8*   CHOLHDLRATIO  --   --  4.2 4.2     Anemia: ferrous sulfate 325mg 2 tablets once daily (prescribed 1 tablet twice daily)  - Reports constipation but is not taking any medication but would like to start a medication     Latest Reference Range & Units 01/26/23 09:00   Hemoglobin 11.7 - 15.7 g/dL 7.9 (L)   (L): Data is abnormally low    Ferritin   Date Value Ref Range Status   06/23/2022 2 (L) 6 - 175 ng/mL Final   05/14/2020 <2 (L) 10 - 130 ng/mL Final     Iron   Date Value Ref Range Status   06/23/2022 12 (L) 37 - 145 ug/dL Final    05/14/2020 11 (L) 42 - 175 ug/dL Final     Iron Binding Capacity   Date Value Ref Range Status   06/23/2022 408 240 - 430 ug/dL Final     Constipation:   - Previously prescribed senna-docusate but is no longer taking this  - She does endorse occasional constipation and is hoping to start a medication to help    Today's Vitals:   BP Readings from Last 3 Encounters:   01/26/23 112/81     Pulse Readings from Last 3 Encounters:   01/26/23 80     Wt Readings from Last 3 Encounters:   01/26/23 143 lb 3.2 oz (65 kg)     ----------------    I spent 30 minutes with this patient today. All changes were made via verbal approval with Sona Bran MD. A copy of the visit note was provided to the patient's provider(s).    A summary of these recommendations was given to the patient (see AVS from today's appointment with Dr. Bran).    Heaven Britton, Pharm.D., MPH  MTM Pharmacist Resident   [facility]     Medication Therapy Recommendations  Constipation, unspecified constipation type    Current Medication: senna-docusate (SENOKOT-S/PERICOLACE) 8.6-50 MG tablet   Rationale: Does not understand instructions - Adherence - Adherence   Recommendation: Provide Education   Status: Accepted per Provider         Diabetes mellitus with proteinuria (H)    Current Medication: lisinopril (ZESTRIL) 5 MG tablet   Rationale: Does not understand instructions - Adherence - Adherence   Recommendation: Provide Education   Status: Patient Agreed - Adherence/Education

## 2023-02-01 ENCOUNTER — TELEPHONE (OUTPATIENT)
Dept: FAMILY MEDICINE | Facility: CLINIC | Age: 43
End: 2023-02-01
Payer: COMMERCIAL

## 2023-02-01 DIAGNOSIS — Z53.9 ERRONEOUS ENCOUNTER--DISREGARD: Primary | ICD-10-CM

## 2023-02-01 NOTE — TELEPHONE ENCOUNTER
Called pt with Tabatha VELASQUEZ) to assist her with scheduling IV Iron infusions.    Called [facility] [phone] and scheduled the IV Iron Infusions:    Tuesday, 2/14/23 at 9:30 AM  Tuesday, 2/21/23 at 9:00 AM  Tuesday, 2/28/23 at 9:30 AM.    Each infusion takes 2.5-3 hrs to infuse.    [facility]  [address] [place] [address]    Go through Cancer Center doors located on the north end of the hospital which is right before the main entrance.  Can park in parking lot D.    2/1/23 1540 LM for pt's daughter, Daniel Po  [phone] as per pt's wishes since she goes to work at 2:45 PM./Liliya Alfredo RN BSN

## 2023-02-07 NOTE — PATIENT INSTRUCTIONS
[facility]  Cancer Center  [address] [place] [address]    Phone: [phone]    APPT: Tuesday, 2/14/23 at 9:30 AM    APPT: Tuesday, 2/21/23 at 9:00 AM    APPT: Tuesday, 2/28/23 at 9:30 AM    NOTE: Each IV Iron Infusion appointment last 2 1/2 to 3 hours  Park in parking lot D and enter in Cancer Center door at north end of building right before main entrance.

## 2023-02-07 NOTE — TELEPHONE ENCOUNTER
Daniel Connelly called back and info given.  She asked that the appt info be faxed to her mom as well and address confirmed in chart./Liliya Alfredo RN BSN

## 2023-02-14 ENCOUNTER — INFUSION THERAPY VISIT (OUTPATIENT)
Dept: INFUSION THERAPY | Facility: HOSPITAL | Age: 43
End: 2023-02-14
Attending: UROLOGY
Payer: COMMERCIAL

## 2023-02-14 VITALS
TEMPERATURE: 97.8 F | RESPIRATION RATE: 16 BRPM | SYSTOLIC BLOOD PRESSURE: 137 MMHG | DIASTOLIC BLOOD PRESSURE: 92 MMHG | HEART RATE: 78 BPM | OXYGEN SATURATION: 99 %

## 2023-02-14 DIAGNOSIS — D50.9 IRON DEFICIENCY ANEMIA, UNSPECIFIED IRON DEFICIENCY ANEMIA TYPE: Primary | ICD-10-CM

## 2023-02-14 PROCEDURE — 96366 THER/PROPH/DIAG IV INF ADDON: CPT

## 2023-02-14 PROCEDURE — 250N000011 HC RX IP 250 OP 636: Performed by: FAMILY MEDICINE

## 2023-02-14 PROCEDURE — 258N000003 HC RX IP 258 OP 636: Performed by: FAMILY MEDICINE

## 2023-02-14 PROCEDURE — 96365 THER/PROPH/DIAG IV INF INIT: CPT

## 2023-02-14 RX ORDER — HEPARIN SODIUM,PORCINE 10 UNIT/ML
5 VIAL (ML) INTRAVENOUS
Status: CANCELLED | OUTPATIENT
Start: 2023-02-16

## 2023-02-14 RX ORDER — ALBUTEROL SULFATE 90 UG/1
1-2 AEROSOL, METERED RESPIRATORY (INHALATION)
Status: CANCELLED
Start: 2023-02-16

## 2023-02-14 RX ORDER — DIPHENHYDRAMINE HYDROCHLORIDE 50 MG/ML
50 INJECTION INTRAMUSCULAR; INTRAVENOUS
Status: DISCONTINUED | OUTPATIENT
Start: 2023-02-14 | End: 2023-02-14 | Stop reason: HOSPADM

## 2023-02-14 RX ORDER — NALOXONE HYDROCHLORIDE 0.4 MG/ML
0.2 INJECTION, SOLUTION INTRAMUSCULAR; INTRAVENOUS; SUBCUTANEOUS
Status: CANCELLED | OUTPATIENT
Start: 2023-02-16

## 2023-02-14 RX ORDER — DIPHENHYDRAMINE HYDROCHLORIDE 50 MG/ML
50 INJECTION INTRAMUSCULAR; INTRAVENOUS
Status: CANCELLED
Start: 2023-02-16

## 2023-02-14 RX ORDER — EPINEPHRINE 1 MG/ML
0.3 INJECTION, SOLUTION INTRAMUSCULAR; SUBCUTANEOUS EVERY 5 MIN PRN
Status: CANCELLED | OUTPATIENT
Start: 2023-02-16

## 2023-02-14 RX ORDER — ALBUTEROL SULFATE 0.83 MG/ML
2.5 SOLUTION RESPIRATORY (INHALATION)
Status: DISCONTINUED | OUTPATIENT
Start: 2023-02-14 | End: 2023-02-14 | Stop reason: HOSPADM

## 2023-02-14 RX ORDER — HEPARIN SODIUM (PORCINE) LOCK FLUSH IV SOLN 100 UNIT/ML 100 UNIT/ML
5 SOLUTION INTRAVENOUS
Status: CANCELLED | OUTPATIENT
Start: 2023-02-16

## 2023-02-14 RX ORDER — NALOXONE HYDROCHLORIDE 0.4 MG/ML
0.2 INJECTION, SOLUTION INTRAMUSCULAR; INTRAVENOUS; SUBCUTANEOUS
Status: DISCONTINUED | OUTPATIENT
Start: 2023-02-14 | End: 2023-02-14 | Stop reason: HOSPADM

## 2023-02-14 RX ORDER — EPINEPHRINE 1 MG/ML
0.3 INJECTION, SOLUTION INTRAMUSCULAR; SUBCUTANEOUS EVERY 5 MIN PRN
Status: DISCONTINUED | OUTPATIENT
Start: 2023-02-14 | End: 2023-02-14 | Stop reason: HOSPADM

## 2023-02-14 RX ORDER — ALBUTEROL SULFATE 0.83 MG/ML
2.5 SOLUTION RESPIRATORY (INHALATION)
Status: CANCELLED | OUTPATIENT
Start: 2023-02-16

## 2023-02-14 RX ORDER — METHYLPREDNISOLONE SODIUM SUCCINATE 125 MG/2ML
125 INJECTION, POWDER, LYOPHILIZED, FOR SOLUTION INTRAMUSCULAR; INTRAVENOUS
Status: CANCELLED
Start: 2023-02-16

## 2023-02-14 RX ORDER — METHYLPREDNISOLONE SODIUM SUCCINATE 125 MG/2ML
125 INJECTION, POWDER, LYOPHILIZED, FOR SOLUTION INTRAMUSCULAR; INTRAVENOUS
Status: DISCONTINUED | OUTPATIENT
Start: 2023-02-14 | End: 2023-02-14 | Stop reason: HOSPADM

## 2023-02-14 RX ORDER — ALBUTEROL SULFATE 90 UG/1
1-2 AEROSOL, METERED RESPIRATORY (INHALATION)
Status: DISCONTINUED | OUTPATIENT
Start: 2023-02-14 | End: 2023-02-14 | Stop reason: HOSPADM

## 2023-02-14 RX ADMIN — IRON SUCROSE 300 MG: 20 INJECTION, SOLUTION INTRAVENOUS at 09:51

## 2023-02-14 RX ADMIN — SODIUM CHLORIDE 250 ML: 9 INJECTION, SOLUTION INTRAVENOUS at 09:51

## 2023-02-14 NOTE — PROGRESS NOTES
Infusion Nursing Note:  Lizzy Wright presents today for Venofer dose 1/3.    Patient seen by provider today: No   present during visit today: Not Applicable.    Note: Lizzy comes to infusion ambulatory and in stable condition. Confirms that she is here for an iron infusion. The medication and its side effects discussed and all questions answered. A peripheral IV was placed in the left forearm x 1 attempt. The iron was given over 90 minutes and then the line was flushed with NS afterwards. Once completed, the IV was removed and the site was covered with 2x2 gauze and secured in place with coban. Left infusion ambulatory and in stable condition at 1202.     Intravenous Access:  Peripheral IV placed.    Treatment Conditions:  Not Applicable.    Post Infusion Assessment:  Patient tolerated infusion without incident.  Patient observed for 30 minutes post 1s dose Iron per protocol.  Blood return noted pre and post infusion.  Site patent and intact, free from redness, edema or discomfort.  Access discontinued per protocol.     Discharge Plan:   Discharge instructions reviewed with: Patient.  Patient and/or family verbalized understanding of discharge instructions and all questions answered.  Copy of AVS reviewed with patient and/or family.  Patient will return on 2/21 for next appointment.  AVS to patient via [employer].  Patient will return 2/21 for next appointment.   Patient discharged in stable condition accompanied by: son.  Departure Mode: Ambulatory.      Ashely Jones RN

## 2023-02-21 ENCOUNTER — INFUSION THERAPY VISIT (OUTPATIENT)
Dept: INFUSION THERAPY | Facility: HOSPITAL | Age: 43
End: 2023-02-21
Attending: UROLOGY
Payer: COMMERCIAL

## 2023-02-21 VITALS
TEMPERATURE: 98.4 F | RESPIRATION RATE: 16 BRPM | SYSTOLIC BLOOD PRESSURE: 136 MMHG | DIASTOLIC BLOOD PRESSURE: 83 MMHG | HEART RATE: 72 BPM | OXYGEN SATURATION: 98 %

## 2023-02-21 DIAGNOSIS — D50.9 IRON DEFICIENCY ANEMIA, UNSPECIFIED IRON DEFICIENCY ANEMIA TYPE: Primary | ICD-10-CM

## 2023-02-21 PROCEDURE — 96366 THER/PROPH/DIAG IV INF ADDON: CPT

## 2023-02-21 PROCEDURE — 96365 THER/PROPH/DIAG IV INF INIT: CPT

## 2023-02-21 PROCEDURE — 250N000011 HC RX IP 250 OP 636: Performed by: FAMILY MEDICINE

## 2023-02-21 PROCEDURE — 258N000003 HC RX IP 258 OP 636: Performed by: FAMILY MEDICINE

## 2023-02-21 RX ORDER — EPINEPHRINE 1 MG/ML
0.3 INJECTION, SOLUTION INTRAMUSCULAR; SUBCUTANEOUS EVERY 5 MIN PRN
Status: CANCELLED | OUTPATIENT
Start: 2023-02-22

## 2023-02-21 RX ORDER — ALBUTEROL SULFATE 0.83 MG/ML
2.5 SOLUTION RESPIRATORY (INHALATION)
Status: DISCONTINUED | OUTPATIENT
Start: 2023-02-21 | End: 2023-02-21 | Stop reason: HOSPADM

## 2023-02-21 RX ORDER — ALBUTEROL SULFATE 90 UG/1
1-2 AEROSOL, METERED RESPIRATORY (INHALATION)
Status: CANCELLED
Start: 2023-02-22

## 2023-02-21 RX ORDER — HEPARIN SODIUM (PORCINE) LOCK FLUSH IV SOLN 100 UNIT/ML 100 UNIT/ML
5 SOLUTION INTRAVENOUS
Status: CANCELLED | OUTPATIENT
Start: 2023-02-22

## 2023-02-21 RX ORDER — NALOXONE HYDROCHLORIDE 0.4 MG/ML
0.2 INJECTION, SOLUTION INTRAMUSCULAR; INTRAVENOUS; SUBCUTANEOUS
Status: DISCONTINUED | OUTPATIENT
Start: 2023-02-21 | End: 2023-02-21 | Stop reason: HOSPADM

## 2023-02-21 RX ORDER — EPINEPHRINE 1 MG/ML
0.3 INJECTION, SOLUTION INTRAMUSCULAR; SUBCUTANEOUS EVERY 5 MIN PRN
Status: DISCONTINUED | OUTPATIENT
Start: 2023-02-21 | End: 2023-02-21 | Stop reason: HOSPADM

## 2023-02-21 RX ORDER — NALOXONE HYDROCHLORIDE 0.4 MG/ML
0.2 INJECTION, SOLUTION INTRAMUSCULAR; INTRAVENOUS; SUBCUTANEOUS
Status: CANCELLED | OUTPATIENT
Start: 2023-02-22

## 2023-02-21 RX ORDER — METHYLPREDNISOLONE SODIUM SUCCINATE 125 MG/2ML
125 INJECTION, POWDER, LYOPHILIZED, FOR SOLUTION INTRAMUSCULAR; INTRAVENOUS
Status: CANCELLED
Start: 2023-02-22

## 2023-02-21 RX ORDER — ALBUTEROL SULFATE 90 UG/1
1-2 AEROSOL, METERED RESPIRATORY (INHALATION)
Status: DISCONTINUED | OUTPATIENT
Start: 2023-02-21 | End: 2023-02-21 | Stop reason: HOSPADM

## 2023-02-21 RX ORDER — METHYLPREDNISOLONE SODIUM SUCCINATE 125 MG/2ML
125 INJECTION, POWDER, LYOPHILIZED, FOR SOLUTION INTRAMUSCULAR; INTRAVENOUS
Status: DISCONTINUED | OUTPATIENT
Start: 2023-02-21 | End: 2023-02-21 | Stop reason: HOSPADM

## 2023-02-21 RX ORDER — ALBUTEROL SULFATE 0.83 MG/ML
2.5 SOLUTION RESPIRATORY (INHALATION)
Status: CANCELLED | OUTPATIENT
Start: 2023-02-22

## 2023-02-21 RX ORDER — HEPARIN SODIUM,PORCINE 10 UNIT/ML
5 VIAL (ML) INTRAVENOUS
Status: CANCELLED | OUTPATIENT
Start: 2023-02-22

## 2023-02-21 RX ORDER — DIPHENHYDRAMINE HYDROCHLORIDE 50 MG/ML
50 INJECTION INTRAMUSCULAR; INTRAVENOUS
Status: CANCELLED
Start: 2023-02-22

## 2023-02-21 RX ORDER — DIPHENHYDRAMINE HYDROCHLORIDE 50 MG/ML
50 INJECTION INTRAMUSCULAR; INTRAVENOUS
Status: DISCONTINUED | OUTPATIENT
Start: 2023-02-21 | End: 2023-02-21 | Stop reason: HOSPADM

## 2023-02-21 RX ADMIN — SODIUM CHLORIDE 250 ML: 9 INJECTION, SOLUTION INTRAVENOUS at 09:22

## 2023-02-21 RX ADMIN — IRON SUCROSE 300 MG: 20 INJECTION, SOLUTION INTRAVENOUS at 09:35

## 2023-02-21 ASSESSMENT — PAIN SCALES - GENERAL: PAINLEVEL: NO PAIN (0)

## 2023-02-21 NOTE — PROGRESS NOTES
Infusion Nursing Note:  Lizzy Wright presents today for 2/3 Venofer    Patient seen by provider today: No   present during visit today: Yes, Language: Tabatha.     Note: Lizzy arrived ambulatory accompanied by her Son in a stable condition. Plan of care reviewed, she acknowledged she tolerated  her first dose no ill effect noted.     Intravenous Access:  Peripheral IV placed.    Treatment Conditions:  Not Applicable.    Post Infusion Assessment:  Patient tolerated infusion without incident.  Patient observed for 30 minutes post Iron infusio per protocol.  Site patent and intact, free from redness, edema or discomfort.  No evidence of extravasations.  Access discontinued per protocol.  Biologic Infusion Post Education: Call the triage nurse at your clinic or seek medical attention if you have chills and/or temperature greater than or equal to 100.5, uncontrolled nausea/vomiting, diarrhea, constipation, dizziness, shortness of breath, chest pain, heart palpitations, weakness or any other new or concerning symptoms, questions or concerns.  You cannot have any live virus vaccines prior to or during treatment or up to 6 months post infusion.  If you have an upcoming surgery, medical procedure or dental procedure during treatment, this should be discussed with your ordering physician and your surgeon/dentist.  If you are having any concerning symptom, if you are unsure if you should get your next infusion or wish to speak to a provider before your next infusion, please call your care coordinator or triage nurse at your clinic to notify them so we can adequately serve you.     Discharge Plan:   Discharge instructions reviewed with: Patient and Family.  Patient and/or family verbalized understanding of discharge instructions and all questions answered.  Patient discharged in stable condition accompanied by: son.  Departure Mode: Ambulatory.      Sujey Szymanski RN

## 2023-03-03 ENCOUNTER — INFUSION THERAPY VISIT (OUTPATIENT)
Dept: INFUSION THERAPY | Facility: HOSPITAL | Age: 43
End: 2023-03-03
Attending: UROLOGY
Payer: COMMERCIAL

## 2023-03-03 VITALS
TEMPERATURE: 98.1 F | OXYGEN SATURATION: 100 % | SYSTOLIC BLOOD PRESSURE: 128 MMHG | HEART RATE: 82 BPM | RESPIRATION RATE: 16 BRPM | DIASTOLIC BLOOD PRESSURE: 78 MMHG

## 2023-03-03 DIAGNOSIS — D50.9 IRON DEFICIENCY ANEMIA, UNSPECIFIED IRON DEFICIENCY ANEMIA TYPE: Primary | ICD-10-CM

## 2023-03-03 PROCEDURE — 96365 THER/PROPH/DIAG IV INF INIT: CPT

## 2023-03-03 PROCEDURE — 258N000003 HC RX IP 258 OP 636: Performed by: FAMILY MEDICINE

## 2023-03-03 PROCEDURE — 96366 THER/PROPH/DIAG IV INF ADDON: CPT

## 2023-03-03 PROCEDURE — 250N000011 HC RX IP 250 OP 636: Performed by: FAMILY MEDICINE

## 2023-03-03 RX ORDER — HEPARIN SODIUM (PORCINE) LOCK FLUSH IV SOLN 100 UNIT/ML 100 UNIT/ML
5 SOLUTION INTRAVENOUS
Status: CANCELLED | OUTPATIENT
Start: 2023-03-03

## 2023-03-03 RX ORDER — METHYLPREDNISOLONE SODIUM SUCCINATE 125 MG/2ML
125 INJECTION, POWDER, LYOPHILIZED, FOR SOLUTION INTRAMUSCULAR; INTRAVENOUS
Status: CANCELLED
Start: 2023-03-03

## 2023-03-03 RX ORDER — ALBUTEROL SULFATE 0.83 MG/ML
2.5 SOLUTION RESPIRATORY (INHALATION)
Status: CANCELLED | OUTPATIENT
Start: 2023-03-03

## 2023-03-03 RX ORDER — ALBUTEROL SULFATE 90 UG/1
1-2 AEROSOL, METERED RESPIRATORY (INHALATION)
Status: CANCELLED
Start: 2023-03-03

## 2023-03-03 RX ORDER — HEPARIN SODIUM,PORCINE 10 UNIT/ML
5 VIAL (ML) INTRAVENOUS
Status: CANCELLED | OUTPATIENT
Start: 2023-03-03

## 2023-03-03 RX ORDER — EPINEPHRINE 1 MG/ML
0.3 INJECTION, SOLUTION INTRAMUSCULAR; SUBCUTANEOUS EVERY 5 MIN PRN
Status: CANCELLED | OUTPATIENT
Start: 2023-03-03

## 2023-03-03 RX ORDER — DIPHENHYDRAMINE HYDROCHLORIDE 50 MG/ML
50 INJECTION INTRAMUSCULAR; INTRAVENOUS
Status: CANCELLED
Start: 2023-03-03

## 2023-03-03 RX ORDER — NALOXONE HYDROCHLORIDE 0.4 MG/ML
0.2 INJECTION, SOLUTION INTRAMUSCULAR; INTRAVENOUS; SUBCUTANEOUS
Status: CANCELLED | OUTPATIENT
Start: 2023-03-03

## 2023-03-03 RX ADMIN — IRON SUCROSE 300 MG: 20 INJECTION, SOLUTION INTRAVENOUS at 08:35

## 2023-03-03 RX ADMIN — SODIUM CHLORIDE 250 ML: 9 INJECTION, SOLUTION INTRAVENOUS at 08:25

## 2023-03-03 NOTE — PROGRESS NOTES
Infusion Nursing Note:  Lizzy Wright presents today for #3/3 venofer 300 mg infusions.    Patient seen by provider today: No   present during visit today: Yes, Language: Tabatha.     Note: Lizzy arrived ambulatory for her last of three iron sucrose infusions.  Denies any side effects from the first two doses.  Venofer infused as ordered. Vital signs stable.      Intravenous Access:  Peripheral IV placed.    Treatment Conditions:  Not Applicable.    Post Infusion Assessment:  Patient tolerated infusion without incident.  Site patent and intact, free from redness, edema or discomfort.  Access discontinued per protocol.     Discharge Plan:   Patient discharged in stable condition accompanied by: family.  Departure Mode: Ambulatory.      Marli Carlos RN

## 2023-03-25 DIAGNOSIS — R80.9 DIABETES MELLITUS WITH PROTEINURIA (H): ICD-10-CM

## 2023-03-25 DIAGNOSIS — E11.29 DIABETES MELLITUS WITH PROTEINURIA (H): ICD-10-CM

## 2023-04-24 ENCOUNTER — OFFICE VISIT (OUTPATIENT)
Dept: FAMILY MEDICINE | Facility: CLINIC | Age: 43
End: 2023-04-24
Payer: COMMERCIAL

## 2023-04-24 VITALS
RESPIRATION RATE: 20 BRPM | SYSTOLIC BLOOD PRESSURE: 126 MMHG | TEMPERATURE: 98 F | DIASTOLIC BLOOD PRESSURE: 88 MMHG | WEIGHT: 147 LBS | HEART RATE: 77 BPM | BODY MASS INDEX: 27.78 KG/M2 | OXYGEN SATURATION: 98 %

## 2023-04-24 DIAGNOSIS — D50.9 IRON DEFICIENCY ANEMIA, UNSPECIFIED IRON DEFICIENCY ANEMIA TYPE: ICD-10-CM

## 2023-04-24 DIAGNOSIS — E11.29 TYPE 2 DIABETES MELLITUS WITH MICROALBUMINURIA, WITHOUT LONG-TERM CURRENT USE OF INSULIN (H): Primary | ICD-10-CM

## 2023-04-24 DIAGNOSIS — R80.9 TYPE 2 DIABETES MELLITUS WITH MICROALBUMINURIA, WITHOUT LONG-TERM CURRENT USE OF INSULIN (H): Primary | ICD-10-CM

## 2023-04-24 DIAGNOSIS — E78.5 HYPERLIPIDEMIA LDL GOAL <70: ICD-10-CM

## 2023-04-24 LAB
FERRITIN SERPL-MCNC: 12 NG/ML (ref 6–175)
IRON BINDING CAPACITY (ROCHE): 365 UG/DL (ref 240–430)
IRON SATN MFR SERPL: 4 % (ref 15–46)
IRON SERPL-MCNC: 16 UG/DL (ref 37–145)
TRANSFERRIN SERPL-MCNC: 335 MG/DL (ref 200–360)

## 2023-04-24 PROCEDURE — 99214 OFFICE O/P EST MOD 30 MIN: CPT | Mod: GC

## 2023-04-24 PROCEDURE — 82728 ASSAY OF FERRITIN: CPT

## 2023-04-24 PROCEDURE — 83540 ASSAY OF IRON: CPT

## 2023-04-24 PROCEDURE — 84466 ASSAY OF TRANSFERRIN: CPT

## 2023-04-24 PROCEDURE — 36415 COLL VENOUS BLD VENIPUNCTURE: CPT

## 2023-04-24 RX ORDER — ATORVASTATIN CALCIUM 20 MG/1
20 TABLET, FILM COATED ORAL DAILY
Qty: 90 TABLET | Refills: 3 | Status: SHIPPED | OUTPATIENT
Start: 2023-04-24 | End: 2024-01-24

## 2023-04-24 RX ORDER — METFORMIN HCL 500 MG
2000 TABLET, EXTENDED RELEASE 24 HR ORAL
Qty: 360 TABLET | Refills: 3 | Status: SHIPPED | OUTPATIENT
Start: 2023-04-24 | End: 2024-01-24

## 2023-04-24 RX ORDER — ATORVASTATIN CALCIUM 20 MG/1
TABLET, FILM COATED ORAL
Qty: 90 TABLET | Refills: 0 | OUTPATIENT
Start: 2023-04-24

## 2023-04-24 NOTE — PROGRESS NOTES
Assessment & Plan     Type 2 diabetes mellitus with microalbuminuria, without long-term current use of insulin (H)  Last A1c was 7.1 on 12/16/2022 which was down from 8.1 from 9/21/2022.  BMP with normal creatinine on 1/26/2023; safe to continue metformin.  Checking blood sugar only in the a.m.  Majority of the time it is fasting and around 150.  - metFORMIN (GLUCOPHAGE XR) 500 MG 24 hr tablet; Take 4 tablets (2,000 mg) by mouth daily (with dinner)  - atorvastatin (LIPITOR) 20 MG tablet; Take 1 tablet (20 mg) by mouth daily  - Hemoglobin A1c; Future    Hyperlipidemia LDL goal <70  Lipids last checked on 1/26/2023.  LDL of 71.  Continue atorvastatin 20 mg daily.  Patient denies any adverse effects with medications.    Iron deficiency anemia, unspecified iron deficiency anemia type  Patient receiving iron transfusions.  Third infusion was completed on 3/3/2023.  Patient interested on how to keep iron higher.  - Given information on foods with high iron content  - Transferrin  - Ferritin  - Iron & Iron Binding Capacity        Return in about 3 months (around 7/24/2023) for DM check.    Patient was staffed with supervising physician, Dr. Froylan Cheema.    Sona Bran MD  [facility]      Nguyễn Ball is a 42 year old, presenting for the following health issues:  other (F/U DM)        1/26/2023     8:04 AM   Additional Questions   Roomed by [place]   Accompanied by Self     HPI     Diabetes Follow-up    How often are you checking your blood sugar? One time daily  What time of day are you checking your blood sugars (select all that apply)?  Before meals  Have you had any blood sugars above 200?  Yes, with certain foods. No adverse effects.  Have you had any blood sugars below 70?  No    What symptoms do you notice when your blood sugar is low?  None and Not applicable    What concerns do you have today about your diabetes? None     Do you have any of these symptoms? (Select all that apply)  No  numbness or tingling in feet.  No redness, sores or blisters on feet.  No complaints of excessive thirst.  No reports of blurry vision.  No significant changes to weight.    Have you had a diabetic eye exam in the last 12 months? No. States she will schedule appointment. She decline referral today.  - On farxiga 5 mg daily and metformin 1000mg daily with dinner.  - Patient has history of tubal ligation.    BP Readings from Last 2 Encounters:   04/24/23 126/88   03/03/23 128/78     Hemoglobin A1C (%)   Date Value   12/16/2022 7.1 (H)   09/21/2022 8.1 (H)   03/23/2021 11.0 (H)   05/14/2020 5.9 (H)     LDL Cholesterol Calculated (mg/dL)   Date Value   01/26/2023 71   11/08/2022 153 (H)   03/23/2021 107   05/19/2016 101     Hyperlipidemia Follow-Up      Are you regularly taking any medication or supplement to lower your cholesterol?   Yes.    Are you having muscle aches or other side effects that you think could be caused by your cholesterol lowering medication?  No  - Atorvastatin 20 mg daily  - Lipid levels completed 1/26/2023 with LDL 71    Iron deficiency Follow-Up  Iron transfusions. Last 3/3/2023. Not taking iron supplements anymore.  - Iron labs on 1/26/2023 showing low iron, ferritin, and iron sat index.    Review of Systems   ROS is negative other than what is listed in the HPI.      Objective    /88 (BP Location: Left arm, Patient Position: Sitting, Cuff Size: Adult Regular)   Pulse 77   Temp 98  F (36.7  C) (Oral)   Resp 20   Wt 66.7 kg (147 lb)   SpO2 98%   BMI 27.78 kg/m    Body mass index is 27.78 kg/m .     Physical Exam   General appearance: alert, in no distress, cooperative  Ears: hearing grossly intact  Lung: Speaking full sentences, no wheezing, coughing, or use of accessory muscles  Abdomen: limited by obesity, soft, non-tender, no masses or organomegaly, symmetrical  Neurologic: ambulatory, spontaneously moving upper and lower extremities without pain, sensation grossly intact in lower  extremities  Psychologic: appropriate mood    Reviewed labs from 1/26/2023.    ----- Service Performed and Documented by Resident or Fellow ------

## 2023-04-24 NOTE — PATIENT INSTRUCTIONS
High-iron sources   Cereals, fortified 1 serving 4.5 to 17.8   Cream of Wheat (quick or instant)* 1/2 cup 7.8   Kidney, beef  2 oz (60 g) 5.3   Liver, beef  2 oz (60 g) 5.8   Liver, calf  2 oz (60 g) 9   Liver, chicken  2 oz (60 g) 6   Liverwurst  2 oz (60 g) 3.6   Nuts? 1 cup 5 to 7   Prune juice 1/2 cup 5.1   Seeds, sunflower? 3 1/2 oz (100 g) 7.1   Moderate-iron sources   Almonds, dried, unblanched 1/2 cup 3   Dried beans and peas   Baked beans, no pork 1/4 cup 1.5   Blackeye peas, cooked 1/4 cup 0.8   Chick peas, dry 1/4 cup 3.5   Great northern beans, cooked 1/4 cup 1.3   Green peas, cooked 1/4 cup 1.4   Lentils, dry 1/4 cup 3.4   Lima beans, cooked 1/4 cup 1.3   Navy beans, cooked 1/4 cup 1.3   Red beans, dry 1/4 cup 3.5   Soybeans, cooked 1/4 cup 1.4   White beans, dry 1/4 cup 3.9   Beef, cooked? 2 oz (60 g) 2 to 3   Ham, cooked 2 oz (60 g) 1.3   Mercer, cooked 2 oz (60 g) 1.9   Peaches, dried 1/4 cup 2.4   Peanuts, roasted without skins 3 1/2 oz (100 g) 3.2   Pork, cooked  2 oz (60 g) 2 to 3   Prunes, dried 2 large 1.1   Raisins? 5/8 cup 3.5   Scallops 2 oz (60 g) 1.6   Spinach (cooked) 1/2 cup 3.2   Spinach (raw) 1 cup 0.9   Turkey, cooked 2 oz (60 g) 1.7   Approximate iron content of popular prepared foods   Hamburger   Small 1 3   Large 1 5.2   Spaghetti with meatballs 1 cup 3.3   Frankfurter and beans 1 cup 4.8   Pork and beans 1 cup 5.9   Chile con carne 1 cup 3.6   Beef burrito or nargis 1 medium 3.4 to 4.6   Cheese pizza 2 slices 3   Cheese pizza with beef 2 slices 4.8   White bread 1 piece 0.7       Dr. Oconnell  [facility]

## 2023-04-24 NOTE — PROGRESS NOTES
Preceptor Attestation:    I discussed the patient with the resident and evaluated the patient in person. I have verified the content of the note, which accurately reflects my assessment of the patient and the plan of care.   Supervising Physician:  Froylan Cheema MD.

## 2023-05-01 ENCOUNTER — LAB (OUTPATIENT)
Dept: LAB | Facility: CLINIC | Age: 43
End: 2023-05-01
Payer: COMMERCIAL

## 2023-05-01 DIAGNOSIS — E11.29 TYPE 2 DIABETES MELLITUS WITH MICROALBUMINURIA, WITHOUT LONG-TERM CURRENT USE OF INSULIN (H): ICD-10-CM

## 2023-05-01 DIAGNOSIS — R80.9 TYPE 2 DIABETES MELLITUS WITH MICROALBUMINURIA, WITHOUT LONG-TERM CURRENT USE OF INSULIN (H): ICD-10-CM

## 2023-05-01 DIAGNOSIS — D50.9 IRON DEFICIENCY ANEMIA, UNSPECIFIED IRON DEFICIENCY ANEMIA TYPE: ICD-10-CM

## 2023-05-01 LAB
HBA1C MFR BLD: 8.3 % (ref 0–5.6)
HGB BLD-MCNC: 11.8 G/DL (ref 11.7–15.7)

## 2023-05-01 PROCEDURE — 83036 HEMOGLOBIN GLYCOSYLATED A1C: CPT

## 2023-05-01 PROCEDURE — 85018 HEMOGLOBIN: CPT

## 2023-05-01 PROCEDURE — 36415 COLL VENOUS BLD VENIPUNCTURE: CPT

## 2023-10-26 ENCOUNTER — PATIENT OUTREACH (OUTPATIENT)
Dept: FAMILY MEDICINE | Facility: CLINIC | Age: 43
End: 2023-10-26
Payer: COMMERCIAL

## 2023-10-26 NOTE — CONFIDENTIAL NOTE
Patient Quality Outreach    Patient is due for the following:   Diabetes -  A1C, Eye Exam, and Microalbumin  Physical Preventive Adult Physical      Topic Date Due    Pneumococcal Vaccine (2 - PCV) 05/19/2017    Flu Vaccine (1) 09/01/2023    COVID-19 Vaccine (5 - 2023-24 season) 09/01/2023    Diptheria Tetanus Pertussis (DTAP/TDAP/TD) Vaccine (5 - Td or Tdap) 10/18/2023       Next Steps:   Schedule a Adult Preventative    Type of outreach:     sent message and will be reaching out to schedule patient for appointment.      Sona Bran MD PGY3  [facility]

## 2023-12-16 ENCOUNTER — TRANSFERRED RECORDS (OUTPATIENT)
Dept: HEALTH INFORMATION MANAGEMENT | Facility: CLINIC | Age: 43
End: 2023-12-16
Payer: COMMERCIAL

## 2023-12-16 LAB — RETINOPATHY: NEGATIVE

## 2024-01-12 ENCOUNTER — CARE COORDINATION (OUTPATIENT)
Dept: FAMILY MEDICINE | Facility: CLINIC | Age: 44
End: 2024-01-12

## 2024-01-12 ENCOUNTER — OFFICE VISIT (OUTPATIENT)
Dept: FAMILY MEDICINE | Facility: CLINIC | Age: 44
End: 2024-01-12
Payer: COMMERCIAL

## 2024-01-12 VITALS
BODY MASS INDEX: 27.4 KG/M2 | HEART RATE: 82 BPM | TEMPERATURE: 97.7 F | RESPIRATION RATE: 16 BRPM | DIASTOLIC BLOOD PRESSURE: 85 MMHG | OXYGEN SATURATION: 100 % | WEIGHT: 145 LBS | SYSTOLIC BLOOD PRESSURE: 132 MMHG

## 2024-01-12 DIAGNOSIS — E11.29 DIABETES MELLITUS WITH PROTEINURIA (H): ICD-10-CM

## 2024-01-12 DIAGNOSIS — R80.9 DIABETES MELLITUS WITH PROTEINURIA (H): ICD-10-CM

## 2024-01-12 DIAGNOSIS — D50.9 IRON DEFICIENCY ANEMIA, UNSPECIFIED IRON DEFICIENCY ANEMIA TYPE: ICD-10-CM

## 2024-01-12 DIAGNOSIS — M72.2 PLANTAR FASCIITIS: Primary | ICD-10-CM

## 2024-01-12 DIAGNOSIS — N18.1 CKD STAGE G1/A3, GFR > 90 AND ALBUMIN CREATININE RATIO >300 MG/G: ICD-10-CM

## 2024-01-12 LAB
CREAT UR-MCNC: 41 MG/DL
HBA1C MFR BLD: 8.7 % (ref 0–5.6)
MICROALBUMIN UR-MCNC: 86.3 MG/L
MICROALBUMIN/CREAT UR: 210.49 MG/G CR (ref 0–25)

## 2024-01-12 PROCEDURE — 36415 COLL VENOUS BLD VENIPUNCTURE: CPT

## 2024-01-12 PROCEDURE — 90677 PCV20 VACCINE IM: CPT

## 2024-01-12 PROCEDURE — 82728 ASSAY OF FERRITIN: CPT

## 2024-01-12 PROCEDURE — 83540 ASSAY OF IRON: CPT

## 2024-01-12 PROCEDURE — 80061 LIPID PANEL: CPT

## 2024-01-12 PROCEDURE — 82570 ASSAY OF URINE CREATININE: CPT

## 2024-01-12 PROCEDURE — 99214 OFFICE O/P EST MOD 30 MIN: CPT | Mod: 25

## 2024-01-12 PROCEDURE — 90472 IMMUNIZATION ADMIN EACH ADD: CPT

## 2024-01-12 PROCEDURE — 80048 BASIC METABOLIC PNL TOTAL CA: CPT

## 2024-01-12 PROCEDURE — 90471 IMMUNIZATION ADMIN: CPT

## 2024-01-12 PROCEDURE — 83036 HEMOGLOBIN GLYCOSYLATED A1C: CPT

## 2024-01-12 PROCEDURE — 82043 UR ALBUMIN QUANTITATIVE: CPT

## 2024-01-12 PROCEDURE — 90715 TDAP VACCINE 7 YRS/> IM: CPT

## 2024-01-12 PROCEDURE — 83550 IRON BINDING TEST: CPT

## 2024-01-12 RX ORDER — FERROUS SULFATE 325(65) MG
325 TABLET ORAL EVERY OTHER DAY
Qty: 180 TABLET | Refills: 3 | Status: SHIPPED | OUTPATIENT
Start: 2024-01-12

## 2024-01-12 NOTE — NURSING NOTE
Due to patient being non-English speaking/uses sign language, an  was used for this visit. Only for face-to-face interpretation by an external agency, date and length of interpretation can be found on the scanned worksheet.     name: Anju Reddy  Agency: [employer]  Language: Tabatha   Telephone number: [phone]  Type of interpretation: Face-to-face, spoken

## 2024-01-12 NOTE — PROGRESS NOTES
Care Coordination Transportation    Appointment Date: 1/24/2024  Appointment Time: 8:00 am   address: [address] [place] [address]  Patient Phone: [phone] or [phone]  Destination:  [address] [place] [address]  Transportation provider:  [employer] Ride:[phone]  Pick time: 7:15 am - 7:40 am  Return Trip: Will call  The Patient has been given a reminder call & trip detail's: yes          Janusz Murdock Sr.   Care Coordination  [address] [address]  [email or web address]   Office: [phone] Direct: [phone]  [facility]

## 2024-01-12 NOTE — PROGRESS NOTES
Assessment & Plan     Plantar fasciitis  Patient has 1 month of right-sided heel pain that has not improved with Tylenol or shoe inserts.  Works as a fairly active job at [employer] is on her feet a lot.  Pain worse in the morning with first steps.  On exam no concerning erythema or swelling and there is some tenderness over the right heel.  Differential includes plantar fasciitis, osteoarthritis, sprain.  Based on history and exam most likely plantar fasciitis.  I went over some exercises to help with plantar fasciitis, suggested icing the foot with a frozen water bottle, and using a rigid shoe insert.  Follow-up in a few months if not improving.  Okay to continue with Tylenol.  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  Her last A1c 8.3 in May.  Currently on Farxiga and metformin.  Plan for today is to check all of the diabetic lab work and have her follow-up with her primary doctor in a few weeks for further management.  - HEMOGLOBIN A1C  - Lipid panel reflex to direct LDL Non-fasting  - blood glucose (NO BRAND SPECIFIED) test strip  Dispense: 100 strip; Refill: 3  - Albumin Random Urine Quantitative with Creat Ratio  - BASIC METABOLIC PANEL    Iron deficiency anemia, unspecified iron deficiency anemia type  History of iron deficiency anemia requiring iron transfusions.  Starting to feel more fatigued and tired recently.  Not currently taking her iron supplements but would like to start this again.  Plan to restart iron supplements and check anemia panel today and follow-up with results with PCP as above.  - Iron & Iron Binding Capacity  - Ferritin  - ferrous sulfate (FEROSUL) 325 (65 Fe) MG tablet  Dispense: 180 tablet; Refill: 3        Hari Jerry MD  [facility]    Nguyễn Ball is a 43 year old,  presenting for the following health issues:  Other (Pain on right heel for the past month /DM )        4/24/2023     8:14 AM   Additional Questions   Roomed by jose juan   Accompanied by self       HPI     Heel pain, Right  Onset: 1 month ago  Description: Sharp. Constant. First step in the morning hurts a lot. Works at [employer].   Progression of Symptoms:  worsening  Previous history of similar problem: No  Precipitating factors:        Worsened by: certain positions of the foot when she walks.   Therapies tried and outcome: Tylenol helps a little bit. Tried some foot supports from [employer] that helped a little.           Objective    /85   Pulse 82   Temp 97.7  F (36.5  C) (Oral)   Resp 16   Wt 65.8 kg (145 lb)   LMP 01/01/2024 (Exact Date)   SpO2 100%   BMI 27.40 kg/m    Body mass index is 27.4 kg/m .  Physical Exam   GENERAL: healthy, alert and no distress  MS: No erythema or swelling of the foot or ankle.  Mild tenderness over heel of the right foot.  Patient also points out some areas of tenderness on the top of the foot medially and laterally.        ----- Service Performed and Documented by Resident or Fellow ------

## 2024-01-13 LAB
ANION GAP SERPL CALCULATED.3IONS-SCNC: 13 MMOL/L (ref 7–15)
BUN SERPL-MCNC: 10.2 MG/DL (ref 6–20)
CALCIUM SERPL-MCNC: 8.7 MG/DL (ref 8.6–10)
CHLORIDE SERPL-SCNC: 105 MMOL/L (ref 98–107)
CHOLEST SERPL-MCNC: 107 MG/DL
CREAT SERPL-MCNC: 0.5 MG/DL (ref 0.51–0.95)
DEPRECATED HCO3 PLAS-SCNC: 20 MMOL/L (ref 22–29)
EGFRCR SERPLBLD CKD-EPI 2021: >90 ML/MIN/1.73M2
FASTING STATUS PATIENT QL REPORTED: ABNORMAL
FERRITIN SERPL-MCNC: 5 NG/ML (ref 6–175)
GLUCOSE SERPL-MCNC: 132 MG/DL (ref 70–99)
HDLC SERPL-MCNC: 31 MG/DL
IRON BINDING CAPACITY (ROCHE): 387 UG/DL (ref 240–430)
IRON SATN MFR SERPL: 3 % (ref 15–46)
IRON SERPL-MCNC: 10 UG/DL (ref 37–145)
LDLC SERPL CALC-MCNC: 61 MG/DL
NONHDLC SERPL-MCNC: 76 MG/DL
POTASSIUM SERPL-SCNC: 3.7 MMOL/L (ref 3.4–5.3)
SODIUM SERPL-SCNC: 138 MMOL/L (ref 135–145)
TRIGL SERPL-MCNC: 73 MG/DL

## 2024-01-24 ENCOUNTER — OFFICE VISIT (OUTPATIENT)
Dept: FAMILY MEDICINE | Facility: CLINIC | Age: 44
End: 2024-01-24
Payer: COMMERCIAL

## 2024-01-24 VITALS
WEIGHT: 145 LBS | TEMPERATURE: 97.8 F | BODY MASS INDEX: 27.38 KG/M2 | RESPIRATION RATE: 20 BRPM | DIASTOLIC BLOOD PRESSURE: 87 MMHG | HEIGHT: 61 IN | SYSTOLIC BLOOD PRESSURE: 130 MMHG | OXYGEN SATURATION: 99 % | HEART RATE: 85 BPM

## 2024-01-24 DIAGNOSIS — E78.5 HYPERLIPIDEMIA LDL GOAL <70: ICD-10-CM

## 2024-01-24 DIAGNOSIS — R80.9 TYPE 2 DIABETES MELLITUS WITH MICROALBUMINURIA, WITHOUT LONG-TERM CURRENT USE OF INSULIN (H): Primary | ICD-10-CM

## 2024-01-24 DIAGNOSIS — B35.1 ONYCHOMYCOSIS: ICD-10-CM

## 2024-01-24 DIAGNOSIS — Z23 NEED FOR PROPHYLACTIC VACCINATION AND INOCULATION AGAINST INFLUENZA: ICD-10-CM

## 2024-01-24 DIAGNOSIS — E11.29 TYPE 2 DIABETES MELLITUS WITH MICROALBUMINURIA, WITHOUT LONG-TERM CURRENT USE OF INSULIN (H): Primary | ICD-10-CM

## 2024-01-24 DIAGNOSIS — D50.9 IRON DEFICIENCY ANEMIA, UNSPECIFIED IRON DEFICIENCY ANEMIA TYPE: ICD-10-CM

## 2024-01-24 PROCEDURE — 90471 IMMUNIZATION ADMIN: CPT

## 2024-01-24 PROCEDURE — 90686 IIV4 VACC NO PRSV 0.5 ML IM: CPT

## 2024-01-24 PROCEDURE — 99214 OFFICE O/P EST MOD 30 MIN: CPT | Mod: 25

## 2024-01-24 PROCEDURE — 99207 PR FOOT EXAM NO CHARGE: CPT

## 2024-01-24 RX ORDER — ATORVASTATIN CALCIUM 20 MG/1
20 TABLET, FILM COATED ORAL DAILY
Qty: 90 TABLET | Refills: 3 | Status: SHIPPED | OUTPATIENT
Start: 2024-01-24

## 2024-01-24 RX ORDER — DAPAGLIFLOZIN 5 MG/1
5 TABLET, FILM COATED ORAL DAILY
Qty: 90 TABLET | Refills: 3 | Status: SHIPPED | OUTPATIENT
Start: 2024-01-24

## 2024-01-24 RX ORDER — METFORMIN HCL 500 MG
2000 TABLET, EXTENDED RELEASE 24 HR ORAL
Qty: 360 TABLET | Refills: 3 | Status: SHIPPED | OUTPATIENT
Start: 2024-01-24

## 2024-01-24 RX ORDER — LISINOPRIL 5 MG/1
5 TABLET ORAL DAILY
Qty: 90 TABLET | Refills: 3 | Status: SHIPPED | OUTPATIENT
Start: 2024-01-24

## 2024-01-24 NOTE — PROGRESS NOTES
Preceptor Attestation:    I discussed the patient with the resident and evaluated the patient in person. I have verified the content of the note, which accurately reflects my assessment of the patient and the plan of care.   Supervising Physician:  Margarette Curtis MD

## 2024-01-24 NOTE — PROGRESS NOTES
Assessment & Plan     Type 2 diabetes mellitus with microalbuminuria, without long-term current use of insulin (H)  A1c not at goal of <7. A1c at 8.7 on 1/12/24. Currently on metformin and farxiga. Lisinopril for kidney protection; blood pressure well controlled. History of using insulin in prior pregnancy. Patient wishes not to return to insulin. States she will try GLP-1 inhibitor 1 weekly injections. Decline referral to pharmacy for education with trulicity pen use. States she will call if she needs assistance. Discussed side effects including but not limited to granuloma formation, pain at injection side, pancreatitis, etc. Diabetic foot exam without signs of neuropathy.  - metFORMIN (GLUCOPHAGE XR) 500 MG 24 hr tablet; Take 4 tablets (2,000 mg) by mouth daily (with dinner)  - dapagliflozin (FARXIGA) 5 MG TABS tablet; Take 1 tablet (5 mg) by mouth daily  - lisinopril (ZESTRIL) 5 MG tablet; Take 1 tablet (5 mg) by mouth daily  - dulaglutide (TRULICITY) 0.75 MG/0.5ML pen; Inject 0.75 mg Subcutaneous every 7 days  - insulin pen needle (32G X 4 MM) 32G X 4 MM miscellaneous; Use 1 pen needles weekly or as directed.    Hyperlipidemia LDL goal <70  At goal. Reviewed lipid levels from 1/12/2024. Patient has history of tubal ligation, so no concern of teratogenic effects with a pregnancy.  - atorvastatin (LIPITOR) 20 MG tablet; Take 1 tablet (20 mg) by mouth daily    Iron deficiency anemia, unspecified iron deficiency anemia type  Continue taking iron supplement.  Do not take your iron supplement with other foods unless they are vitamin C rich.  DO NOT take with calcium rich foods.  Introduce iron rich foods into your diet. Hand out given.    Need for prophylactic vaccination and inoculation against influenza  - INFLUENZA VACCINE IM > 6 MONTHS VALENT IIV4 (AFLURIA/FLUZONE)    Onychomycosis  Right hallux. Does not bother patient.    Return in about 3 months (around 4/24/2024) for DM.    Subjective   Ball is a 43 year  "old, presenting for the following health issues:  Diabetes and Imm/Inj (Flu Shot)        1/24/2024     8:11 AM   Additional Questions   Roomed by lito   Accompanied by self     HPI     Diabetes Follow-up    How often are you checking your blood sugar? One time daily  What time of day are you checking your blood sugars (select all that apply)?  Before meals and in the morning  Have you had any blood sugars above 200?  No  Have you had any blood sugars below 70?  No  What symptoms do you notice when your blood sugar is low?  None  What concerns do you have today about your diabetes? None   Do you have any of these symptoms? (Select all that apply)  No numbness or tingling in feet.  No redness, sores or blisters on feet.  No complaints of excessive thirst.  No reports of blurry vision.  No significant changes to weight.    Hyperlipidemia Follow-Up    Are you regularly taking any medication or supplement to lower your cholesterol?   Yes- atorvastatin 20 mg  Are you having muscle aches or other side effects that you think could be caused by your cholesterol lowering medication?  No    Hypertension Follow-up    Do you check your blood pressure regularly outside of the clinic? No   Are you following a low salt diet? No    BP Readings from Last 2 Encounters:   01/24/24 130/87   01/12/24 132/85     Hemoglobin A1C (%)   Date Value   01/12/2024 8.7 (H)   05/01/2023 8.3 (H)   03/23/2021 11.0 (H)   05/14/2020 5.9 (H)     LDL Cholesterol Calculated (mg/dL)   Date Value   01/12/2024 61   01/26/2023 71   03/23/2021 107   05/19/2016 101       Review of Systems  ROS is negative other than what is listed in the HPI.      Objective    /87   Pulse 85   Temp 97.8  F (36.6  C) (Oral)   Resp 20   Ht 1.553 m (5' 1.14\")   Wt 65.8 kg (145 lb)   LMP 01/01/2024 (Exact Date)   SpO2 99%   BMI 27.27 kg/m    Body mass index is 27.27 kg/m .    Physical Exam   General appearance: alert, in no distress, cooperative  Head: normocephalic, " without obvious abnormalities  Eyes: conjunctivae/corneas clear  Ears: hearing grossly intact  Nose: nares normal, no drainage  Lung: Speaking in full sentences, no cough, no wheezing  Psychologic: Appropriate mood  Diabetic foot exam: normal DP pulses, no ulcerative lesions, and normal sensory exam. Right hallux toenail build-up      Office Visit on 01/12/2024   Component Date Value Ref Range Status    Creatinine Urine mg/dL 01/12/2024 41.0  mg/dL Final    The reference ranges have not been established in urine creatinine. The results should be integrated into the clinical context for interpretation.    Albumin Urine mg/L 01/12/2024 86.3  mg/L Final    The reference ranges have not been established in urine albumin. The results should be integrated into the clinical context for interpretation.    Albumin Urine mg/g Cr 01/12/2024 210.49 (H)  0.00 - 25.00 mg/g Cr Final    Microalbuminuria is defined as an albumin:creatinine ratio of 17 to 299 for males and 25 to 299 for females. A ratio of albumin:creatinine of 300 or higher is indicative of overt proteinuria.  Due to biologic variability, positive results should be confirmed by a second, first-morning random or 24-hour timed urine specimen. If there is discrepancy, a third specimen is recommended. When 2 out of 3 results are in the microalbuminuria range, this is evidence for incipient nephropathy and warrants increased efforts at glucose control, blood pressure control, and institution of therapy with an angiotensin-converting-enzyme (ACE) inhibitor (if the patient can tolerate it).      Hemoglobin A1C 01/12/2024 8.7 (H)  0.0 - 5.6 % Final    Normal <5.7%   Prediabetes 5.7-6.4%    Diabetes 6.5% or higher     Note: Adopted from ADA consensus guidelines.    Sodium 01/12/2024 138  135 - 145 mmol/L Final    Reference intervals for this test were updated on 09/26/2023 to more accurately reflect our healthy population. There may be differences in the flagging of prior  results with similar values performed with this method. Interpretation of those prior results can be made in the context of the updated reference intervals.     Potassium 01/12/2024 3.7  3.4 - 5.3 mmol/L Final    Chloride 01/12/2024 105  98 - 107 mmol/L Final    Carbon Dioxide (CO2) 01/12/2024 20 (L)  22 - 29 mmol/L Final    Anion Gap 01/12/2024 13  7 - 15 mmol/L Final    Urea Nitrogen 01/12/2024 10.2  6.0 - 20.0 mg/dL Final    Creatinine 01/12/2024 0.50 (L)  0.51 - 0.95 mg/dL Final    GFR Estimate 01/12/2024 >90  >60 mL/min/1.73m2 Final    Calcium 01/12/2024 8.7  8.6 - 10.0 mg/dL Final    Glucose 01/12/2024 132 (H)  70 - 99 mg/dL Final    Cholesterol 01/12/2024 107  <200 mg/dL Final    Triglycerides 01/12/2024 73  <150 mg/dL Final    Direct Measure HDL 01/12/2024 31 (L)  >=50 mg/dL Final    LDL Cholesterol Calculated 01/12/2024 61  <=100 mg/dL Final    Non HDL Cholesterol 01/12/2024 76  <130 mg/dL Final    Patient Fasting > 8hrs? 01/12/2024 Unknown   Final    Iron 01/12/2024 10 (L)  37 - 145 ug/dL Final    Iron Binding Capacity 01/12/2024 387  240 - 430 ug/dL Final    Iron Sat Index 01/12/2024 3 (L)  15 - 46 % Final    Ferritin 01/12/2024 5 (L)  6 - 175 ng/mL Final         Patient was staffed with supervising physician, Dr. Margarette Curtis.    Signed Electronically by: Sona Bran MD

## 2024-01-24 NOTE — PATIENT INSTRUCTIONS
Diabetes: start taking once a week injections for diabetes control  - If you have questions, please call the clinic and we can schedule appointment with pharmacy to show you how to use them again.

## 2024-01-29 ENCOUNTER — TELEPHONE (OUTPATIENT)
Dept: FAMILY MEDICINE | Facility: CLINIC | Age: 44
End: 2024-01-29
Payer: COMMERCIAL

## 2024-01-29 DIAGNOSIS — E11.29 TYPE 2 DIABETES MELLITUS WITH MICROALBUMINURIA, WITHOUT LONG-TERM CURRENT USE OF INSULIN (H): Primary | ICD-10-CM

## 2024-01-29 DIAGNOSIS — R80.9 TYPE 2 DIABETES MELLITUS WITH MICROALBUMINURIA, WITHOUT LONG-TERM CURRENT USE OF INSULIN (H): Primary | ICD-10-CM

## 2024-01-29 NOTE — TELEPHONE ENCOUNTER
Plan does not cover this medication dulaglutide (TRULICITY) 0.75 MG/0.5ML pen. Want to send alternative or do PA?  Please advise.    DIA FarleyA

## 2024-02-01 NOTE — TELEPHONE ENCOUNTER
Trulicity not covered by patient's insurance. Will send bydureon to the pharmacy instead. If both are not covered, we will complete a prior auth.    Sona Bran MD, PGY3  [facility]

## 2024-03-12 ENCOUNTER — TELEPHONE (OUTPATIENT)
Dept: FAMILY MEDICINE | Facility: CLINIC | Age: 44
End: 2024-03-12

## 2024-03-12 NOTE — TELEPHONE ENCOUNTER
Called and scheduled patient on 3/25/24 with Pharm D for DM med switching to Ozempic.   Lillie Beckwith SAW    Due to patient being non-English speaking/uses sign language, an  was used for this visit. Only for face-to-face interpretation by an external agency, date and length of interpretation can be found on the scanned worksheet.     name:           ID:  Agency: [facility] [employer] Phone Interpreting  Language: Tabatha   Telephone number: [phone]  Type of interpretation: Telephone, spoken

## 2024-03-23 NOTE — PROGRESS NOTES
Medication Therapy Management (MTM) Encounter    ASSESSMENT:                            Medication Adherence/Access:  Difficult to assess adherence without vials here today. Reinforced importance of daily adherence to chronic medications as prescribed without missing doses.     Diabetes:  A1c not at goal <7%, postprandial  sugars are high per home blood glucose monitoring. She filled a whole month supply of Bydureon and has it with her today so will continue for now. Provided Bydureon education today, patient gave first injection in clinic today. Recommend patient continue checking blood sugar twice daily and bring glucometer to all clinic visits. Advised patient to resume Farxiga daily as directed. Could consider Farxiga dose increase after patient has resumed daily use.      Hypertension:  Blood pressure not at goal <130/80 mmHg today, recommend patient refill and resume taking lisinopril daily as prescribed and recheck with PCP in 3 weeks.     Hyperlipidemia:   Stable on moderate intensity statin.     Supplements   Reviewed every other day dosing recommendation of iron for better efficacy, patient verbalized understanding of directions.     Pain:   Stable    PLAN:                            Give Bydureon 2 mg every week on Mondays  Check to see if you are taking lisinopril for blood pressure- resume 1 tablet daily   Change anemia (ferrous sulfate) to every other day- Monday, Wednesday, Friday    refill of Farxiga for blood sugar and take 1 tablet once daily  Bring all of your medications and glucometer to next visit.     Bydureon BCise Education:  1. Instructed patient to store pens in the refrigerator and to take 1 pen out 15 minutes before use to allow it to come to room temperature.  2. Instructed patient on proper hygiene prior to administration including handwashing and cleaning administration site with alcohol swab.  3. Instructed patient to shake pen vigorously 15 times and how to check to ensure  proper mixing.  4. Instructed patient how to unlock pen and administer medication.  5. Instructed patient on proper disposal of used pens.  6. Discussed potential adverse effects with Bydureon.  7. Using a demo pen, demonstrated all steps for proper technique for preparation and administration.  8. Patient was able to demonstrate correct technique using a demo pen and administered first dose in clinic under my supervision.    Follow-up: Return in about 3 weeks (around 4/15/2024) for with PCP. And PharmD    Medication issues to be addressed at a future visit    Increase Farxiga to 10mg in 1 month   Make sure she is taking lisinopril  Switch from Bydureon to Ozempic as it is now covered under straight [employer].    SUBJECTIVE/OBJECTIVE:                          Lizzy Wright is a 43 year old female coming in for an initial visit. She was referred to me from Dr Bran. Patient seen with OZZY Mays .     Reason for visit: diabetes.    Allergies/ADRs: Reviewed in chart  Past Medical History: Reviewed in chart  Tobacco: She reports that she has never smoked. She has never used smokeless tobacco.  Alcohol: none    Medication Adherence/Access: Patient takes medications directly from bottles.  Patient takes medications 1 time(s) per day. Per patient, misses medication 0 times per week. Patient brings Bydureon to visit, no medication vials.     Diabetes   Farxiga 5 mg daily - though has not been filled since 2023  Bydureon 2 mg weekly- has not started   Metformin XR 2000 mg daily- is taking 4 tablets daily   Patient is not experiencing side effects.  Blood sugar monitorin time(s) daily; Ranges:  150-160 mg/dL in morning, 200s after meals   Current diabetes symptoms: none  Diet/Exercise: Eating 2-3 times daily      Eye exam is up to date  Foot exam is up to date  Urine Albumin:   Lab Results   Component Value Date    UMALCR 210.49 (H) 2024      Lab Results   Component Value Date    A1C 8.7 (H) 2024  "    Hypertension   Lisinopril 5 mg daily - does not think she has a blood pressure medication   Patient reports no current medication side effects  Patient does not self-monitor blood pressure.       BP Readings from Last 3 Encounters:   03/25/24 (!) 137/91   01/24/24 130/87   01/12/24 132/85     Pulse Readings from Last 3 Encounters:   03/25/24 85   01/24/24 85   01/12/24 82     Hyperlipidemia   Atorvastatin 20 mg daily  Patient reports no significant myalgias or other side effects.  The ASCVD Risk score (Patrice RODRÍGUEZ, et al., 2019) failed to calculate for the following reasons:    The valid total cholesterol range is 130 to 320 mg/dL       Recent Labs   Lab Test 01/12/24  1353 01/26/23  0900 06/23/22  0950 03/23/21  0952 05/19/16  1038   CHOL 107 164   < > 183.8 181.4   HDL 31* 39*   < > 43.5 42.7   LDL 61 71   < > 107 101   TRIG 73 272*   < > 168.6* 188.8*   CHOLHDLRATIO  --   --   --  4.2 4.2    < > = values in this interval not displayed.     Supplements /iron deficiency anemia  Ferrous sulfate 325 every other day - though patient is taking every day  Hemoglobin   Date Value Ref Range Status   05/01/2023 11.8 11.7 - 15.7 g/dL Final   03/23/2021 8.8 (L) 12.0 - 16.0 g/dL Final   No reported issues at this time.        Pain:   Acetaminophen 325-650 mg every 6 hours as needed  Using as needed, no side effects reported.     Today's Vitals: BP (!) 137/91 (BP Location: Left arm, Patient Position: Sitting, Cuff Size: Adult Regular)   Pulse 85   Temp 97.6  F (36.4  C) (Oral)   Ht 5' 1.14\" (1.553 m)   Wt 143 lb 3.2 oz (65 kg)   SpO2 99%   BMI 26.93 kg/m    ----------------    I spent 40 minutes with this patient today. Dr. Bran (resident physician) was provided the recommendations above  via routed note and Dr. Cheema is the authorizing prescriber for this visit through the pharmacist collaborative practice agreement.. A copy of the visit note was provided to the patient's provider(s).    A summary of these " recommendations was given to the patient.    Andreina Hurst, Pharm.D.  Medication Therapy Management Pharmacy Resident        Medication Therapy Recommendations  Anemia, iron deficiency    Current Medication: ferrous sulfate (FEROSUL) 325 (65 Fe) MG tablet   Rationale: Frequency inappropriate - Dosage too high - Safety   Recommendation: Decrease Frequency   Status: Patient Agreed - Adherence/Education         Benign essential HTN    Rationale: Untreated condition - Needs additional medication therapy - Indication   Recommendation: Start Medication - lisinopril 5 MG tablet   Status: Patient Agreed - Adherence/Education         Diabetes mellitus with proteinuria (H)    Current Medication: exenatide ER (BYDUREON BCISE) 2 MG/0.85ML auto-injector   Rationale: Does not understand instructions - Adherence - Adherence   Recommendation: Provide Education   Status: Patient Agreed - Adherence/Education

## 2024-03-25 ENCOUNTER — OFFICE VISIT (OUTPATIENT)
Dept: PHARMACY | Facility: CLINIC | Age: 44
End: 2024-03-25
Payer: MEDICAID

## 2024-03-25 VITALS
DIASTOLIC BLOOD PRESSURE: 91 MMHG | HEART RATE: 85 BPM | WEIGHT: 143.2 LBS | TEMPERATURE: 97.6 F | OXYGEN SATURATION: 99 % | SYSTOLIC BLOOD PRESSURE: 137 MMHG | HEIGHT: 61 IN | BODY MASS INDEX: 27.03 KG/M2

## 2024-03-25 DIAGNOSIS — Z78.9 TAKES DIETARY SUPPLEMENTS: ICD-10-CM

## 2024-03-25 DIAGNOSIS — R80.9 DIABETES MELLITUS WITH PROTEINURIA (H): Primary | ICD-10-CM

## 2024-03-25 DIAGNOSIS — I10 BENIGN ESSENTIAL HTN: ICD-10-CM

## 2024-03-25 DIAGNOSIS — E11.29 DIABETES MELLITUS WITH PROTEINURIA (H): Primary | ICD-10-CM

## 2024-03-25 DIAGNOSIS — E78.2 MIXED HYPERLIPIDEMIA: ICD-10-CM

## 2024-03-25 DIAGNOSIS — R52 PAIN: ICD-10-CM

## 2024-03-25 DIAGNOSIS — D50.8 OTHER IRON DEFICIENCY ANEMIA: ICD-10-CM

## 2024-03-25 PROCEDURE — 99607 MTMS BY PHARM ADDL 15 MIN: CPT | Performed by: PHARMACIST

## 2024-03-25 PROCEDURE — 99605 MTMS BY PHARM NP 15 MIN: CPT | Performed by: PHARMACIST

## 2024-03-25 NOTE — NURSING NOTE
Due to patient being non-English speaking/uses sign language, an  was used for this visit. Only for face-to-face interpretation by an external agency, date and length of interpretation can be found on the scanned worksheet.     name: Cory Chacon  Agency: [employer]  Language: Tabatha   Telephone number:   Type of interpretation: Face-to-face, spoken
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
## 2024-03-25 NOTE — PATIENT INSTRUCTIONS
"Recommendations from today's MTM visit:                                                      Give Bydureon 2 mg every week on Mondays  Change anemia (ferrous sulfate) to every other day- Monday, Wednesday, Friday   Check to see if you are taking lisinopril for blood pressure   refill of Farxiga for blood sugar    Follow-up: Return in about 3 weeks (around 4/15/2024) for with PCP., 1 month with PharmD    It was great speaking with you today.  I value your experience and would be very thankful for your time in providing feedback in our clinic survey. In the next few days, you may receive an email or text message from [employer] with a link to a survey related to your  clinical pharmacist.\"     To schedule another MTM appointment, please call the clinic directly or you may call the MTM scheduling line at [phone].    My Clinical Pharmacist's contact information:                                                      Please feel free to contact me with any questions or concerns you have.      Andreina Hurst, Pharm.D.  Medication Therapy Management Pharmacy Resident        "

## 2024-03-25 NOTE — PROGRESS NOTES
I have verified the content of the note, which accurately reflects my assessment of the patient and the plan of care.   Almaz Aguero, [facility], PharmD

## 2024-05-30 ENCOUNTER — TELEPHONE (OUTPATIENT)
Dept: CARE COORDINATION | Facility: CLINIC | Age: 44
End: 2024-05-30

## 2024-05-30 NOTE — TELEPHONE ENCOUNTER
Care Coordination: 5/30/2024      Andreina Hurst, Pharm.D. messaged Care Coordination that this patient may be without Medical Insurance and requested that we assist. Support Call this morning to discuss this matter. However, the patients Sister in Law reported that she has already applied for coverage through [facility].        Janusz Murdock Sr.   Care Coordination  [address] [address]  [email or web address]   Office: [phone] Direct: [phone]  [facility]

## 2024-08-26 ENCOUNTER — TELEPHONE (OUTPATIENT)
Dept: FAMILY MEDICINE | Facility: CLINIC | Age: 44
End: 2024-08-26

## 2024-08-26 NOTE — TELEPHONE ENCOUNTER
Patient Quality Outreach    Patient is due for the following:   Diabetes -  A1C  Hypertension -  BP check and Hypertension follow-up visit    Next Steps:   NEED APPT FOR BP    Type of outreach:    Phone, left message for patient/parent to call back.    Next Steps:  Reach out within 2-7 days via Letter.    Max number of attempts reached: Yes. Will try again in 2-7 days if patient still on fail list.    Questions for provider review:    None           DIA FarleyA

## 2025-01-06 ENCOUNTER — TELEPHONE (OUTPATIENT)
Dept: FAMILY MEDICINE | Facility: CLINIC | Age: 45
End: 2025-01-06

## 2025-01-06 NOTE — TELEPHONE ENCOUNTER
Patient Quality Outreach    Patient is due for the following:   Hypertension -  BP check    Action(s) Taken:   Patient was scheduled for [facility] BP BUT PT DOES NOT HAVE INSURNACE RIGHT NOW    Type of outreach:    Phone, spoke to patient/parent.      Questions for provider review:    None           [place] Sheriff MA

## (undated) DEVICE — Device

## (undated) DEVICE — URETEROSCOPE FLEX SLG USE STD 7.7FR LITHOVUE M0067913500

## (undated) DEVICE — DRSG TEGADERM 4X4 3/4" 1626W

## (undated) DEVICE — SUCTION STRIP WATERBOOM 36" 94030

## (undated) DEVICE — DILATOR RENAL AMPLATZ TYPE 8FRX35CM M0062601010

## (undated) DEVICE — LINEN ORTHO PACK 5446

## (undated) DEVICE — GUIDEWIRE AMPLATZ SUPER STIFF .035 X 145CM M0066401080

## (undated) DEVICE — BASKET STONE EXTRACTOR NITINOL NCIRCLE 1.5FRX115CM G46206

## (undated) DEVICE — SYR 30ML LL W/O NDL 302832

## (undated) DEVICE — SOL WATER IRRIG 1000ML BOTTLE 2F7114

## (undated) DEVICE — DRSG STERI STRIP 1/2X4" R1547

## (undated) DEVICE — TAPE DURAPORE 3" SILK 1538-3

## (undated) DEVICE — BLADE KNIFE SURG 11 371111

## (undated) DEVICE — DRSG GAUZE 4X8" NON21842

## (undated) DEVICE — TUBING SET THERMEDX UROLOGY SGL USE LL0006

## (undated) DEVICE — DRAPE PCNL 41-0021

## (undated) DEVICE — GLOVE BIOGEL PI MICRO SZ 7.5 48575

## (undated) DEVICE — LASER FIBER HOLMIUM MOSES 200 D/F/L AC-10030100

## (undated) DEVICE — SUCTION MANIFOLD NEPTUNE 2 SYS 4 PORT 0702-020-000

## (undated) DEVICE — STRAP KNEE/BODY 31143004

## (undated) DEVICE — DRAPE C-ARM W/STRAPS 42X72" 07-CA104

## (undated) DEVICE — DRSG KERLIX FLUFFS X5

## (undated) DEVICE — PROBE LITHOTRIPTOR 3.4X396MM SHOCKPULSE BLUE SPL-PDBX340

## (undated) DEVICE — NDL 25GA 1.5" 305127

## (undated) DEVICE — SOL NACL 0.9% IRRIG 3000ML BAG 2B7477

## (undated) DEVICE — WIRE GUIDE NITINOL BIWIRE 0.035"X150CM G46141

## (undated) DEVICE — SOL NACL 0.9% IRRIG 1000ML BOTTLE 2F7124

## (undated) DEVICE — PREP CHLORAPREP 26ML TINTED HI-LITE ORANGE 930815

## (undated) DEVICE — KIT CATH BALLOON NEPHROMAX 24FRX12CM M0062101600

## (undated) DEVICE — SU CHROMIC 3-0 SH 27" G122H

## (undated) DEVICE — GUIDEWIRE SENSOR DUAL FLEX STR 0.035"X150CM M0066703080

## (undated) DEVICE — SPECIMEN CONTAINER 5OZ STERILE 2600SA

## (undated) DEVICE — CATH ANGIO JB1 SOFT-VU 5FRX65CM MARINER 11734101

## (undated) DEVICE — CATH TRAY FOLEY SURESTEP 16FR WDRAIN BAG STLK LATEX A300316A

## (undated) DEVICE — NDL COUNTER 40CT  31142311

## (undated) DEVICE — LINEN GOWN X4 5410

## (undated) DEVICE — TUBING EXTENSION SET 20" B1327

## (undated) DEVICE — CATH URETERAL OPEN END 5FRX70CM M0064002010

## (undated) DEVICE — GUIDEWIRE BENTSON FLEX TIP 0.035"X150CM M0066201250

## (undated) DEVICE — SYR 10ML FINGER CONTROL W/O NDL 309695

## (undated) DEVICE — NDL PERC ACCESS NAVIGUIDE W/SLDER 18GX20CM M0067001330

## (undated) DEVICE — ADAPTER SCOPE UROLOK II LF M0067301400

## (undated) DEVICE — TUBING SUCTION MEDI-VAC 1/4"X20' N620A

## (undated) RX ORDER — FENTANYL CITRATE 50 UG/ML
INJECTION, SOLUTION INTRAMUSCULAR; INTRAVENOUS
Status: DISPENSED
Start: 2022-11-14

## (undated) RX ORDER — ONDANSETRON 2 MG/ML
INJECTION INTRAMUSCULAR; INTRAVENOUS
Status: DISPENSED
Start: 2022-11-14

## (undated) RX ORDER — KETOROLAC TROMETHAMINE 30 MG/ML
INJECTION, SOLUTION INTRAMUSCULAR; INTRAVENOUS
Status: DISPENSED
Start: 2022-11-14

## (undated) RX ORDER — OXYCODONE HYDROCHLORIDE 5 MG/1
TABLET ORAL
Status: DISPENSED
Start: 2022-11-14

## (undated) RX ORDER — LIDOCAINE HYDROCHLORIDE 20 MG/ML
JELLY TOPICAL
Status: DISPENSED
Start: 2022-11-22

## (undated) RX ORDER — LIDOCAINE HYDROCHLORIDE 20 MG/ML
INJECTION, SOLUTION EPIDURAL; INFILTRATION; INTRACAUDAL; PERINEURAL
Status: DISPENSED
Start: 2022-11-14

## (undated) RX ORDER — GABAPENTIN 100 MG/1
CAPSULE ORAL
Status: DISPENSED
Start: 2022-11-14

## (undated) RX ORDER — HYDROMORPHONE HCL IN WATER/PF 6 MG/30 ML
PATIENT CONTROLLED ANALGESIA SYRINGE INTRAVENOUS
Status: DISPENSED
Start: 2022-11-14

## (undated) RX ORDER — FENTANYL CITRATE-0.9 % NACL/PF 10 MCG/ML
PLASTIC BAG, INJECTION (ML) INTRAVENOUS
Status: DISPENSED
Start: 2022-11-14

## (undated) RX ORDER — ACETAMINOPHEN 325 MG/1
TABLET ORAL
Status: DISPENSED
Start: 2022-11-14

## (undated) RX ORDER — DEXAMETHASONE SODIUM PHOSPHATE 4 MG/ML
INJECTION, SOLUTION INTRA-ARTICULAR; INTRALESIONAL; INTRAMUSCULAR; INTRAVENOUS; SOFT TISSUE
Status: DISPENSED
Start: 2022-11-14

## (undated) RX ORDER — PROPOFOL 10 MG/ML
INJECTION, EMULSION INTRAVENOUS
Status: DISPENSED
Start: 2022-11-14

## (undated) RX ORDER — BUPIVACAINE HYDROCHLORIDE 2.5 MG/ML
INJECTION, SOLUTION EPIDURAL; INFILTRATION; INTRACAUDAL
Status: DISPENSED
Start: 2022-11-14